# Patient Record
Sex: FEMALE | Race: BLACK OR AFRICAN AMERICAN | Employment: UNEMPLOYED | URBAN - METROPOLITAN AREA
[De-identification: names, ages, dates, MRNs, and addresses within clinical notes are randomized per-mention and may not be internally consistent; named-entity substitution may affect disease eponyms.]

---

## 2017-02-08 ENCOUNTER — OFFICE VISIT (OUTPATIENT)
Dept: CARDIOLOGY CLINIC | Age: 62
End: 2017-02-08

## 2017-02-08 VITALS
DIASTOLIC BLOOD PRESSURE: 76 MMHG | SYSTOLIC BLOOD PRESSURE: 130 MMHG | BODY MASS INDEX: 38.5 KG/M2 | WEIGHT: 190.6 LBS | HEART RATE: 88 BPM

## 2017-02-08 DIAGNOSIS — E11.9 TYPE 2 DIABETES MELLITUS WITHOUT COMPLICATION, WITHOUT LONG-TERM CURRENT USE OF INSULIN (HCC): ICD-10-CM

## 2017-02-08 DIAGNOSIS — I44.7 LBBB (LEFT BUNDLE BRANCH BLOCK): ICD-10-CM

## 2017-02-08 DIAGNOSIS — R06.02 SOB (SHORTNESS OF BREATH) ON EXERTION: Primary | ICD-10-CM

## 2017-02-08 DIAGNOSIS — E66.01 MORBID OBESITY DUE TO EXCESS CALORIES (HCC): ICD-10-CM

## 2017-02-08 PROCEDURE — 99214 OFFICE O/P EST MOD 30 MIN: CPT | Performed by: INTERNAL MEDICINE

## 2017-03-06 ENCOUNTER — HOSPITAL ENCOUNTER (OUTPATIENT)
Dept: MAMMOGRAPHY | Age: 62
Discharge: HOME | End: 2017-03-06
Attending: NURSE PRACTITIONER | Admitting: NURSE PRACTITIONER

## 2017-03-06 DIAGNOSIS — Z12.31 VISIT FOR SCREENING MAMMOGRAM: ICD-10-CM

## 2017-03-07 ENCOUNTER — TELEPHONE (OUTPATIENT)
Dept: SURGERY | Age: 62
End: 2017-03-07

## 2017-05-23 ENCOUNTER — HOSPITAL ENCOUNTER (OUTPATIENT)
Dept: PULMONOLOGY | Age: 62
Discharge: HOME | End: 2017-05-23
Attending: FAMILY MEDICINE | Admitting: FAMILY MEDICINE

## 2017-05-23 ENCOUNTER — HOSPITAL ENCOUNTER (OUTPATIENT)
Dept: NON INVASIVE DIAGNOSTICS | Age: 62
Discharge: HOME | End: 2017-05-23
Admitting: INTERNAL MEDICINE

## 2017-05-23 DIAGNOSIS — R06.02 SHORTNESS OF BREATH: ICD-10-CM

## 2017-05-23 LAB
LV EF: 53 %
LVEF MODALITY: NORMAL

## 2017-05-23 RX ORDER — ALBUTEROL SULFATE 2.5 MG/3ML
2.5 SOLUTION RESPIRATORY (INHALATION) ONCE
Status: COMPLETED | OUTPATIENT
Start: 2017-05-23 | End: 2017-05-23

## 2017-05-23 RX ADMIN — ALBUTEROL SULFATE 2.5 MG: 2.5 SOLUTION RESPIRATORY (INHALATION) at 10:07

## 2018-05-22 ENCOUNTER — OFFICE VISIT (OUTPATIENT)
Dept: CARDIOLOGY CLINIC | Age: 63
End: 2018-05-22

## 2018-05-22 VITALS
WEIGHT: 194.8 LBS | DIASTOLIC BLOOD PRESSURE: 62 MMHG | BODY MASS INDEX: 39.34 KG/M2 | SYSTOLIC BLOOD PRESSURE: 118 MMHG | HEART RATE: 58 BPM

## 2018-05-22 DIAGNOSIS — E78.2 MIXED HYPERLIPIDEMIA: Primary | ICD-10-CM

## 2018-05-22 DIAGNOSIS — I44.7 LBBB (LEFT BUNDLE BRANCH BLOCK): ICD-10-CM

## 2018-05-22 PROCEDURE — 99214 OFFICE O/P EST MOD 30 MIN: CPT | Performed by: NURSE PRACTITIONER

## 2018-05-22 PROCEDURE — 1036F TOBACCO NON-USER: CPT | Performed by: NURSE PRACTITIONER

## 2018-05-22 PROCEDURE — G8400 PT W/DXA NO RESULTS DOC: HCPCS | Performed by: NURSE PRACTITIONER

## 2018-05-22 PROCEDURE — G8417 CALC BMI ABV UP PARAM F/U: HCPCS | Performed by: NURSE PRACTITIONER

## 2018-05-22 PROCEDURE — 3017F COLORECTAL CA SCREEN DOC REV: CPT | Performed by: NURSE PRACTITIONER

## 2018-05-22 PROCEDURE — G8427 DOCREV CUR MEDS BY ELIG CLIN: HCPCS | Performed by: NURSE PRACTITIONER

## 2018-05-22 PROCEDURE — 1123F ACP DISCUSS/DSCN MKR DOCD: CPT | Performed by: NURSE PRACTITIONER

## 2018-05-22 PROCEDURE — 1090F PRES/ABSN URINE INCON ASSESS: CPT | Performed by: NURSE PRACTITIONER

## 2018-05-22 PROCEDURE — 4040F PNEUMOC VAC/ADMIN/RCVD: CPT | Performed by: NURSE PRACTITIONER

## 2018-05-22 RX ORDER — DULOXETIN HYDROCHLORIDE 20 MG/1
20 CAPSULE, DELAYED RELEASE ORAL DAILY
COMMUNITY

## 2018-06-06 ENCOUNTER — HOSPITAL ENCOUNTER (OUTPATIENT)
Dept: GENERAL RADIOLOGY | Age: 63
Discharge: HOME | End: 2018-06-06
Attending: NURSE PRACTITIONER | Admitting: NURSE PRACTITIONER

## 2018-06-06 DIAGNOSIS — Z78.0 POSTMENOPAUSAL: ICD-10-CM

## 2018-06-06 DIAGNOSIS — Z13.820 SCREENING FOR OSTEOPOROSIS: ICD-10-CM

## 2018-06-06 LAB
A/G RATIO: 1.2 (ref 1.1–2.2)
ALBUMIN SERPL-MCNC: 4.1 G/DL (ref 3.4–5)
ALP BLD-CCNC: 56 U/L (ref 40–129)
ALT SERPL-CCNC: 27 U/L (ref 10–40)
ANION GAP SERPL CALCULATED.3IONS-SCNC: 15 MMOL/L (ref 3–16)
AST SERPL-CCNC: 23 U/L (ref 15–37)
BILIRUB SERPL-MCNC: <0.2 MG/DL (ref 0–1)
BUN BLDV-MCNC: 16 MG/DL (ref 7–20)
CALCIUM SERPL-MCNC: 9.7 MG/DL (ref 8.3–10.6)
CHLORIDE BLD-SCNC: 98 MMOL/L (ref 99–110)
CHOLESTEROL, TOTAL: 119 MG/DL (ref 0–199)
CO2: 26 MMOL/L (ref 21–32)
CREAT SERPL-MCNC: 0.8 MG/DL (ref 0.6–1.2)
GFR AFRICAN AMERICAN: >60
GFR NON-AFRICAN AMERICAN: >60
GLOBULIN: 3.4 G/DL
GLUCOSE BLD-MCNC: 168 MG/DL (ref 70–99)
HDLC SERPL-MCNC: 51 MG/DL (ref 40–60)
LDL CHOLESTEROL CALCULATED: 46 MG/DL
POTASSIUM SERPL-SCNC: 5.1 MMOL/L (ref 3.5–5.1)
SODIUM BLD-SCNC: 139 MMOL/L (ref 136–145)
TOTAL PROTEIN: 7.5 G/DL (ref 6.4–8.2)
TRIGL SERPL-MCNC: 109 MG/DL (ref 0–150)
VLDLC SERPL CALC-MCNC: 22 MG/DL

## 2018-07-16 ENCOUNTER — HOSPITAL ENCOUNTER (OUTPATIENT)
Dept: MAMMOGRAPHY | Age: 63
Discharge: HOME | End: 2018-07-20
Payer: MEDICARE

## 2018-07-16 DIAGNOSIS — Z12.31 VISIT FOR SCREENING MAMMOGRAM: ICD-10-CM

## 2018-07-16 PROCEDURE — 77067 SCR MAMMO BI INCL CAD: CPT

## 2018-09-12 ENCOUNTER — TELEPHONE (OUTPATIENT)
Dept: SURGERY | Age: 63
End: 2018-09-12

## 2018-09-12 ENCOUNTER — OFFICE VISIT (OUTPATIENT)
Dept: SURGERY | Age: 63
End: 2018-09-12

## 2018-09-12 VITALS
HEIGHT: 59 IN | BODY MASS INDEX: 37.29 KG/M2 | RESPIRATION RATE: 17 BRPM | WEIGHT: 185 LBS | SYSTOLIC BLOOD PRESSURE: 128 MMHG | DIASTOLIC BLOOD PRESSURE: 84 MMHG

## 2018-09-12 DIAGNOSIS — K64.1 GRADE II HEMORRHOIDS: Primary | ICD-10-CM

## 2018-09-12 PROCEDURE — 1101F PT FALLS ASSESS-DOCD LE1/YR: CPT | Performed by: SURGERY

## 2018-09-12 PROCEDURE — 1036F TOBACCO NON-USER: CPT | Performed by: SURGERY

## 2018-09-12 PROCEDURE — 3017F COLORECTAL CA SCREEN DOC REV: CPT | Performed by: SURGERY

## 2018-09-12 PROCEDURE — G8399 PT W/DXA RESULTS DOCUMENT: HCPCS | Performed by: SURGERY

## 2018-09-12 PROCEDURE — G8427 DOCREV CUR MEDS BY ELIG CLIN: HCPCS | Performed by: SURGERY

## 2018-09-12 PROCEDURE — 1123F ACP DISCUSS/DSCN MKR DOCD: CPT | Performed by: SURGERY

## 2018-09-12 PROCEDURE — 1090F PRES/ABSN URINE INCON ASSESS: CPT | Performed by: SURGERY

## 2018-09-12 PROCEDURE — 99203 OFFICE O/P NEW LOW 30 MIN: CPT | Performed by: SURGERY

## 2018-09-12 PROCEDURE — 4040F PNEUMOC VAC/ADMIN/RCVD: CPT | Performed by: SURGERY

## 2018-09-12 PROCEDURE — G8417 CALC BMI ABV UP PARAM F/U: HCPCS | Performed by: SURGERY

## 2018-09-12 RX ORDER — HYDROCORTISONE ACETATE 25 MG/1
25 SUPPOSITORY RECTAL DAILY PRN
Qty: 30 SUPPOSITORY | Refills: 1 | Status: ON HOLD | OUTPATIENT
Start: 2018-09-12 | End: 2019-05-03 | Stop reason: HOSPADM

## 2018-09-12 NOTE — PROGRESS NOTES
noted in HPI  GI: reviewed and negative except as noted in HPI      Objective:     GEN: appears well, no distress, appears stated age  PSYCH: normal mood, normal affect  NECK: no neck masses, trachea midline  ENT: moist oral mucosa; anicteric  SKIN: no rash or jaundice  CV: regular heart rate and rhythm  PULM: normal respiratory effort, no wheezing  GI: soft non tender abdomen. Normal bowel sounds  RECTAL: No external masses or lesions. Some pain on rectal exam without obvious findings. She is unable to prolapse any tissue. Small internal hemorrhoids not bleeding on anoscopy.     EXT/NEURO: normal gait, strength/sensation grossly intact in all extremities      Assessment:     Rectal pain, bleeding     Plan:     Suppository trial    Recommend colonoscopy to evaluate for any source of bleeding such as polyp or Margarita Niño M.D.  9/12/18   12:15 PM

## 2018-09-12 NOTE — LETTER
COLONOSCOPY: Pre-Procedure Information, Instructions and Prep    YOU ARE SCHEDULED FOR A COLONOSCOPY WITH DR. Dat Stark    Date and time:  Thursday 10/11/2018 @ 9:15 a.m. Please arrive at the Endoscopy Unit @ 7:45 a.m. Location: [facility] [address] [place] [address]. [facility], [address]    · The bowel prep solution (GoLytely) will be called in to the [address] on [address] [facility]. Approximately 7 days before your procedure. You will need it starting at 4:00 p.m. on 10/10/18. It comes in a powder form that you mix with water. Most people prefer the taste after it has been refrigerated. PLEASE READ THIS ENTIRE HANDOUT THOROUGHLY. CALL THE OFFICE WITH ANY QUESTIONS. # [phone] - Michelle P. What is a Colonoscopy? A colonoscopy is a test (also called a procedure) that lets the doctor look inside your large intestine (colon). The doctor uses a thin, lighted tube called a colonoscope. There are many reasons to perform a colonoscopy, such as bleeding, abdominal pain, anemia, diarrhea, constipation, or for cancer prevention. During the procedure, the doctor can remove abnormal growths (polyps), which can help decrease the risk of developing colon and rectal cancer. Tissue sampling (biopsy) can also be performed to help diagnose other conditions of the colon and rectum. How is a Colonoscopy Done? This procedure is done in the Endoscopy Unit at the hospital. Please arrive  90 minutes before the procedure. After you arrive and register, an IV will be placed. An anesthesia provider will give you medicine to help you relax and not feel pain. Most people do not remember having the test because of the sedation medication. The doctor gently moves the colonoscope instrument through the colon and fills it with air. There is a small video camera on the tip that allows the doctor to see the colon and take pictures.  The procedure usually takes 30 to 45 minutes. It may take longer if the doctor has to remove polyps. Scheduling Your Colonoscopy    Your colonoscopy will typically be scheduled weeks to months in advance. The prep solution will be called into your pharmacy a few days in advance of your procedure. Please make sure you have a working telephone, mailing address, and email address in our computer system, as the office will communicate with you as a courtesy 2 days before your procedure, as well as 3-5 days afterward with any biopsy results. If you need to cancel or reschedule, please do so at least 7 days before the procedure, so that we can be considerate to other patients who are waiting to be scheduled. If you cancel or reschedule less than 7 days before your procedure, you will be placed on a lower priority list and may be charged a cancellation fee. How Do You Prepare for the Procedure? · Understand exactly what procedure is planned, along with the risks  · Tell your doctors ALL the medicines, vitamins, supplements, and herbal remedies you take. Some of these can increase the risk of bleeding or interact with anesthesia. · If you take blood thinners (Coumadin, aspirin, Plavix, Pletal, Aggrenox, Xarelto, Pradaxa, Eliquis), be sure to talk to your doctor. He or she will tell you if and when you should stop taking these medicines before your procedure. · If you are diabetic please talk to your Doctor about your Insulin instructions. PLEASE READ AND COMPLETELY FOLLOW THE PREP INFORMATION BELOW    1 Day Before your Colonoscopy    · ON THE DAY PRIOR TO YOUR PROCEDURE, DO NOT EAT ANY SOLID FOOD THE ENTIRE DAY  · ON THE DAY PRIOR TO YOUR PROCEDURE, DRINK ONLY CLEAR LIQUIDS (SEE LAST PAGE FOR LIST OF \"APPROVED\" LIQUIDS)  · Drink at least 8oz of clear liquids every hour throughout the day to keep hydrated.   · In the morning - mix the GoLytely solution (this was sent to your pharmacy) and place in the refrigerator. Do not eat any solid food the entire day. · At 4:00 p.m. - Drink an 8oz glass of GoLytely every 10-20 min until ½ of the bottle is empty. If you become nauseated, take a rest and then be sure to finish half of the bottle. The Day of your Colonoscopy    · 6 hours before your procedure (3:15 a.m.) - Drink an 8oz glass of GoLytely every 10-20 min until the bottle is completely gone. · You may continue to have clear liquids up to 3 hours before the test (nothing after 6:15 a.m.). · The morning of the procedure - You may take any heart or blood pressure medications. · You must have someone WITH YOU to drive you home after the procedure. · Please arrive at the hospital 90 minutes prior to your procedure start time. · Please bring a list of medications, any inhalers, CPAP machines, photo ID and insurance cards with you. If you have a pacemaker or defibrillator, please inform your nurse. For many people, the prep is worse than the procedure itself. It may be uncomfortable, and you may feel hungry on the clear liquid diet. Some people do not go to work or do not do their usual activities on the day of the prep. If you eat solid food or do not complete the prep, your colonoscopy may be cancelled or you may be asked to do the procedure again. What can you expect after a colonoscopy? The nurses will watch you in the recovery area for 1-2 hours until the medicines wear off. Then you can go home. You will need a ride. You can resume a normal diet after the procedure. You are not legally allowed to drive or operate machinery after the procedure, as you will have received sedation. Do not plan on going back to work that day. Your doctor will talk to you about when you will need your next colonoscopy. This will depend on the results of the colonoscopy and your medical and family history.     Complications: Colonoscopy is generally a very safe procedure with low complication risk. Common complaints after the procedure include bloating and excessive flatulence, and occasionally nausea. This is normal.     Other complications include:  · Anesthesia/sedation issues  · Bleeding, especially if polyps are removed (uncommon)  · Most bleeding will stop on its own, but occasionally can require a repeat colonoscopy or hospital admission  · This risk is slightly higher in patients on blood thinners. Ask your doctor about any blood thinning medications that you take   · Perforation, or a hole in the colon, which can require hospital admission or emergency surgery (very rare)    Call the office [phone] or go to your nearest emergency room if you have fever greater than 101º, severe abdominal pain that does not get better after a few hours, or rectal bleeding that does not stop after a few hours. You may also call the office with any non-emergency questions or concerns. Follow-up care is a key part of your treatment and safety:    If polyps or other abnormalities are found, tissue samples will be sent to the pathology lab to be examined. Results are usually available in 3-5 business days. My office typically calls with results. If you do not hear from us, please CALL US and ask for your results: [phone]. Sometimes these results will determine when your next colonoscopy is recommended. Be sure to communicate with your primary care physician about the results of your colonoscopy. Clear Liquid Diet for Colonoscopy Preparation      The day before and the morning of your colonoscopy, you will be on a clear liquid diet. Below, you will find examples of liquids you can consume. You will want to make sure you drink plenty of fluids so your body stays hydrated. Starting the day before your screening, dont eat any solid food until after your colonoscopy.     YES  OK TO DRINK NO  AVOID THESE   Water  Tea and black coffee without any milk or cream   Flavored water without red or purple dye   Clear, light colored juices such as apple, white grape, lemonade without    pulp, and white cranberry   Clear broth including chicken, beef, or vegetable (containing no meat or    vegetables)   Soda   Sports drinks such as Gatorade and Propel (light colors only)   Popsicles without fruit or cream; no red or purple dye   Jell-O or other gelatin without fruit; no red or purple dye   Boost Breeze nutritional drink (other Boost products not allowed)

## 2018-09-17 ENCOUNTER — TELEPHONE (OUTPATIENT)
Dept: SURGERY | Age: 63
End: 2018-09-17

## 2018-09-24 ENCOUNTER — TELEPHONE (OUTPATIENT)
Dept: SURGERY | Age: 63
End: 2018-09-24

## 2018-09-24 NOTE — TELEPHONE ENCOUNTER
Patient called because insurance will not cover prescription for suppository. ..asks that Dr. Angelal Adler writes new script and sends to [facility] on [address] as soon as possible. She requests return call to let her know when new script has been sent.  [phone] (home)

## 2018-09-25 DIAGNOSIS — K64.9 HEMORRHOIDS, UNSPECIFIED HEMORRHOID TYPE: Primary | ICD-10-CM

## 2018-09-25 RX ORDER — LIDOCAINE 50 MG/G
OINTMENT TOPICAL
Qty: 1 TUBE | Refills: 1 | Status: SHIPPED | OUTPATIENT
Start: 2018-09-25 | End: 2019-07-03

## 2018-09-25 NOTE — TELEPHONE ENCOUNTER
E-scripted topical numbing medicine to Elizabeth. She can try that. Could also try Preparation H or Witch Hazel over the counter.  Please let her know    CAR

## 2018-09-27 ENCOUNTER — TELEPHONE (OUTPATIENT)
Dept: SURGERY | Age: 63
End: 2018-09-27

## 2018-09-27 NOTE — TELEPHONE ENCOUNTER
CARLOS and explained that her colonoscopy with Dr. Jacinta Curling scheduled for 10/11/18 needed to be re-scheduled (provider unavailable). Patient is re-scheduled for 10/25/18 at 8:30 am arrival time 7:00 am at [facility]. Marked applicable changes on surgery order and faxed to [facility]. Patient mentioned making sure the GoLytely is covered by her insurance. Spoke with Rebecca Young. at [address] [facility] is covered at no cost to patient.

## 2018-10-17 DIAGNOSIS — K62.5 RECTAL BLEED: Primary | ICD-10-CM

## 2018-10-17 DIAGNOSIS — K64.1 GRADE II HEMORRHOIDS: ICD-10-CM

## 2018-10-23 ENCOUNTER — TELEPHONE (OUTPATIENT)
Dept: SURGERY | Age: 63
End: 2018-10-23

## 2018-10-23 NOTE — TELEPHONE ENCOUNTER
CARLOS and reminded her of colonoscopy scheduled for Thursday at [facility] at 8:30 am arrival time 7:00 am. Patient knows not to eat any solid food tomorrow, drink plenty of the \"approved\" liquids and start drinking the Golytely at 4:00 pm. Patient knows to have someone with her at the hospital that can drive her home after the colonoscopy. Patient has my direct number in case of any questions.

## 2018-10-25 ENCOUNTER — ANESTHESIA (OUTPATIENT)
Dept: ENDOSCOPY | Age: 63
End: 2018-10-25
Payer: COMMERCIAL

## 2018-10-25 ENCOUNTER — HOSPITAL ENCOUNTER (OUTPATIENT)
Age: 63
Setting detail: OUTPATIENT SURGERY
Discharge: HOME | End: 2018-10-25
Attending: SURGERY | Admitting: SURGERY
Payer: COMMERCIAL

## 2018-10-25 ENCOUNTER — ANESTHESIA EVENT (OUTPATIENT)
Dept: ENDOSCOPY | Age: 63
End: 2018-10-25
Payer: COMMERCIAL

## 2018-10-25 VITALS
TEMPERATURE: 98.5 F | HEIGHT: 59 IN | WEIGHT: 185 LBS | RESPIRATION RATE: 20 BRPM | OXYGEN SATURATION: 100 % | SYSTOLIC BLOOD PRESSURE: 122 MMHG | DIASTOLIC BLOOD PRESSURE: 71 MMHG | BODY MASS INDEX: 37.29 KG/M2 | HEART RATE: 73 BPM

## 2018-10-25 VITALS
OXYGEN SATURATION: 98 % | RESPIRATION RATE: 22 BRPM | SYSTOLIC BLOOD PRESSURE: 101 MMHG | DIASTOLIC BLOOD PRESSURE: 68 MMHG

## 2018-10-25 PROCEDURE — 7100000010 HC PHASE II RECOVERY - FIRST 15 MIN: Performed by: SURGERY

## 2018-10-25 PROCEDURE — 3700000001 HC ADD 15 MINUTES (ANESTHESIA): Performed by: SURGERY

## 2018-10-25 PROCEDURE — 7100000011 HC PHASE II RECOVERY - ADDTL 15 MIN: Performed by: SURGERY

## 2018-10-25 PROCEDURE — 3700000000 HC ANESTHESIA ATTENDED CARE: Performed by: SURGERY

## 2018-10-25 PROCEDURE — 2580000003 HC RX 258: Performed by: SURGERY

## 2018-10-25 PROCEDURE — 2500000003 HC RX 250 WO HCPCS: Performed by: NURSE ANESTHETIST, CERTIFIED REGISTERED

## 2018-10-25 PROCEDURE — 3609009500 HC COLONOSCOPY DIAGNOSTIC OR SCREENING: Performed by: SURGERY

## 2018-10-25 PROCEDURE — 6360000002 HC RX W HCPCS: Performed by: NURSE ANESTHETIST, CERTIFIED REGISTERED

## 2018-10-25 PROCEDURE — 45330 DIAGNOSTIC SIGMOIDOSCOPY: CPT | Performed by: SURGERY

## 2018-10-25 RX ORDER — PROPOFOL 10 MG/ML
INJECTION, EMULSION INTRAVENOUS PRN
Status: DISCONTINUED | OUTPATIENT
Start: 2018-10-25 | End: 2018-10-25 | Stop reason: SDUPTHER

## 2018-10-25 RX ORDER — MIDAZOLAM HYDROCHLORIDE 1 MG/ML
INJECTION INTRAMUSCULAR; INTRAVENOUS PRN
Status: DISCONTINUED | OUTPATIENT
Start: 2018-10-25 | End: 2018-10-25 | Stop reason: SDUPTHER

## 2018-10-25 RX ORDER — PROPOFOL 10 MG/ML
INJECTION, EMULSION INTRAVENOUS CONTINUOUS PRN
Status: DISCONTINUED | OUTPATIENT
Start: 2018-10-25 | End: 2018-10-25 | Stop reason: SDUPTHER

## 2018-10-25 RX ORDER — SODIUM CHLORIDE, SODIUM LACTATE, POTASSIUM CHLORIDE, CALCIUM CHLORIDE 600; 310; 30; 20 MG/100ML; MG/100ML; MG/100ML; MG/100ML
INJECTION, SOLUTION INTRAVENOUS ONCE
Status: COMPLETED | OUTPATIENT
Start: 2018-10-25 | End: 2018-10-25

## 2018-10-25 RX ORDER — LIDOCAINE HYDROCHLORIDE 10 MG/ML
INJECTION, SOLUTION EPIDURAL; INFILTRATION; INTRACAUDAL; PERINEURAL PRN
Status: DISCONTINUED | OUTPATIENT
Start: 2018-10-25 | End: 2018-10-25 | Stop reason: SDUPTHER

## 2018-10-25 RX ADMIN — SODIUM CHLORIDE, POTASSIUM CHLORIDE, SODIUM LACTATE AND CALCIUM CHLORIDE: 600; 310; 30; 20 INJECTION, SOLUTION INTRAVENOUS at 08:39

## 2018-10-25 RX ADMIN — PROPOFOL 120 MCG/KG/MIN: 10 INJECTION, EMULSION INTRAVENOUS at 08:49

## 2018-10-25 RX ADMIN — LIDOCAINE HYDROCHLORIDE 30 MG: 10 INJECTION, SOLUTION EPIDURAL; INFILTRATION; INTRACAUDAL; PERINEURAL at 08:44

## 2018-10-25 RX ADMIN — MIDAZOLAM HYDROCHLORIDE 2 MG: 1 INJECTION INTRAMUSCULAR; INTRAVENOUS at 08:46

## 2018-10-25 RX ADMIN — PROPOFOL 50 MG: 10 INJECTION, EMULSION INTRAVENOUS at 08:49

## 2018-10-25 ASSESSMENT — PULMONARY FUNCTION TESTS
PIF_VALUE: 0
PIF_VALUE: 1
PIF_VALUE: 0

## 2018-11-08 NOTE — H&P
Patient is a 70 y.o. woman with rectal pain and bleeding     HPI: Ms. Jade Galloway reports several weeks of rectal pain and bleeding. She has never had this before. She thinks her bottom may be falling out but denies having to reduce anything. She had extensive intestinal surgery in 2016          Patient Active Problem List     Diagnosis Date Noted    Encephalopathy acute 10/23/2016    H/O ileostomy 09/20/2016    Type 2 diabetes mellitus without complication, without long-term current use of insulin ([facility] 09/16/2016    Debility 03/23/2016    Respiratory failure following trauma and surgery ([facility] 03/06/2016    Ischemic bowel disease ([facility] 03/06/2016    Obesity 03/06/2016    Col[facility] obstruction ([facility] 03/03/2016      Past Medical History        Past Medical History:   Diagnosis Date    Diabetes mellitus ([facility]      Diverticulitis      H/O ischemic bowel disease 03/2016    Hepatitis C      History of kidney stones       x 1 episode    Hyperlipidemia      LBBB (left bundle branch block)      Loose stools       secondary to history of colectomy    Paranoid schizophrenia (HCC)      Proteinuria       history of    Sciatica      Sleep apnea       USES CPAP         Past Surgical History         Past Surgical History:   Procedure Laterality Date    ABDOMEN SURGERY   09/20/2016     OPEN ILEOSTOMY TAKEDOWN                   COLONOSCOPY        DILATION AND CURETTAGE OF UTERUS         w/tubaligation    HYSTERECTOMY         PARTIAL VAGINAL    KIDNEY STONE SURGERY        KIDNEY STONE SURGERY        KNEE SURGERY         bilateral knee replacements    OTHER SURGICAL HISTORY   3/5/16     DIAGNOSTIC LAPAROSCOPY, SUBTOTAL COLECTOMY, COLOSTOMY,    WISDOM TOOTH EXTRACTION             Prescriptions Prior to Admission   Not in a hospital admission.      No Known Allergies   Social History   Substance Use Topics    Smoking status: Former Smoker       Packs/day: 0.00       Types: Cigarettes       Start date: 3/22/2001     Quit date: 2/22/2016    Smokeless tobacco: Never Used    Alcohol use No         Review of Systems     GEN: reviewed and negative except as noted in HPI  GI: reviewed and negative except as noted in HPI        Objective:      GEN: appears well, no distress, appears stated age  PSYCH: normal mood, normal affect  NECK: no neck masses, trachea midline  ENT: moist oral mucosa; anicteric  SKIN: no rash or jaundice  CV: regular heart rate and rhythm  PULM: normal respiratory effort, no wheezing  GI: soft non tender abdomen. Normal bowel sounds  RECTAL: No external masses or lesions. Some pain on rectal exam without obvious findings. She is unable to prolapse any tissue. Small internal hemorrhoids not bleeding on anoscopy.     EXT/NEURO: normal gait, strength/sensation grossly intact in all extremities        Assessment:      Rectal pain, bleeding      Plan:      Suppository trial     RBA of colonoscopy were discussed prior to the procedure    Lizzy Gates M.D.  11/8/18   10:40 AM

## 2019-03-06 ENCOUNTER — OFFICE VISIT (OUTPATIENT)
Dept: SURGERY | Age: 64
End: 2019-03-06
Payer: COMMERCIAL

## 2019-03-06 VITALS
SYSTOLIC BLOOD PRESSURE: 144 MMHG | WEIGHT: 176 LBS | RESPIRATION RATE: 16 BRPM | DIASTOLIC BLOOD PRESSURE: 82 MMHG | BODY MASS INDEX: 35.48 KG/M2 | HEIGHT: 59 IN

## 2019-03-06 DIAGNOSIS — K62.3 RECTAL PROLAPSE: Primary | ICD-10-CM

## 2019-03-06 PROCEDURE — 1123F ACP DISCUSS/DSCN MKR DOCD: CPT | Performed by: SURGERY

## 2019-03-06 PROCEDURE — 99214 OFFICE O/P EST MOD 30 MIN: CPT | Performed by: SURGERY

## 2019-03-06 PROCEDURE — G8427 DOCREV CUR MEDS BY ELIG CLIN: HCPCS | Performed by: SURGERY

## 2019-03-06 PROCEDURE — G8417 CALC BMI ABV UP PARAM F/U: HCPCS | Performed by: SURGERY

## 2019-03-06 PROCEDURE — 1090F PRES/ABSN URINE INCON ASSESS: CPT | Performed by: SURGERY

## 2019-03-06 PROCEDURE — 1101F PT FALLS ASSESS-DOCD LE1/YR: CPT | Performed by: SURGERY

## 2019-03-06 PROCEDURE — 1036F TOBACCO NON-USER: CPT | Performed by: SURGERY

## 2019-03-06 PROCEDURE — 3017F COLORECTAL CA SCREEN DOC REV: CPT | Performed by: SURGERY

## 2019-03-06 PROCEDURE — G8484 FLU IMMUNIZE NO ADMIN: HCPCS | Performed by: SURGERY

## 2019-03-06 PROCEDURE — 4040F PNEUMOC VAC/ADMIN/RCVD: CPT | Performed by: SURGERY

## 2019-03-06 PROCEDURE — G8399 PT W/DXA RESULTS DOCUMENT: HCPCS | Performed by: SURGERY

## 2019-03-08 ENCOUNTER — TELEPHONE (OUTPATIENT)
Dept: SURGERY | Age: 64
End: 2019-03-08

## 2019-03-08 DIAGNOSIS — R19.7 DIARRHEA, UNSPECIFIED TYPE: Primary | ICD-10-CM

## 2019-03-11 RX ORDER — DIPHENOXYLATE HYDROCHLORIDE AND ATROPINE SULFATE 2.5; .025 MG/1; MG/1
1 TABLET ORAL 2 TIMES DAILY PRN
Qty: 60 TABLET | Refills: 1 | Status: SHIPPED | OUTPATIENT
Start: 2019-03-11 | End: 2019-05-14 | Stop reason: SDUPTHER

## 2019-03-28 ENCOUNTER — TELEPHONE (OUTPATIENT)
Dept: SURGERY | Age: 64
End: 2019-03-28

## 2019-03-28 RX ORDER — METRONIDAZOLE 500 MG/1
TABLET ORAL
Qty: 3 TABLET | Refills: 0 | Status: ON HOLD | OUTPATIENT
Start: 2019-03-28 | End: 2019-05-03 | Stop reason: HOSPADM

## 2019-03-28 RX ORDER — NEOMYCIN SULFATE 500 MG/1
TABLET ORAL
Qty: 6 TABLET | Refills: 0 | Status: ON HOLD | OUTPATIENT
Start: 2019-03-28 | End: 2019-05-03 | Stop reason: HOSPADM

## 2019-04-02 ENCOUNTER — TELEPHONE (OUTPATIENT)
Dept: SURGERY | Age: 64
End: 2019-04-02

## 2019-04-02 NOTE — TELEPHONE ENCOUNTER
[facility] health called stating that the recopexy was not approved for inpatient but was approved for out patient. I asked the representative if we could switch this to a 23 observation. She said she would call me back with an answer.      Dr. Dex Milligan are you ok with switching this to 23 obvs

## 2019-04-04 ENCOUNTER — TELEPHONE (OUTPATIENT)
Dept: SURGERY | Age: 64
End: 2019-04-04

## 2019-04-04 NOTE — TELEPHONE ENCOUNTER
The patient is scheduled for surgery with Dr. Michelle Mason on 4-. She received her surgery instructions in the mail today. She would like to go over the instructions with someone. Please call.

## 2019-04-05 DIAGNOSIS — K62.3 RECTAL PROLAPSE: Primary | ICD-10-CM

## 2019-04-05 RX ORDER — POLYETHYLENE GLYCOL 3350 17 G/17G
17 POWDER, FOR SOLUTION ORAL DAILY PRN
Qty: 510 G | Refills: 0 | Status: ON HOLD | OUTPATIENT
Start: 2019-04-05 | End: 2019-05-03 | Stop reason: HOSPADM

## 2019-04-05 RX ORDER — DOCUSATE SODIUM 100 MG/1
100 CAPSULE, LIQUID FILLED ORAL DAILY PRN
Qty: 30 CAPSULE | Refills: 0 | Status: ON HOLD | OUTPATIENT
Start: 2019-04-05 | End: 2019-05-03 | Stop reason: HOSPADM

## 2019-04-11 ENCOUNTER — TELEPHONE (OUTPATIENT)
Dept: SURGERY | Age: 64
End: 2019-04-11

## 2019-04-17 ENCOUNTER — HOSPITAL ENCOUNTER (EMERGENCY)
Age: 64
Discharge: HOME | End: 2019-04-17
Attending: EMERGENCY MEDICINE
Payer: COMMERCIAL

## 2019-04-17 VITALS
DIASTOLIC BLOOD PRESSURE: 95 MMHG | TEMPERATURE: 98.4 F | WEIGHT: 173 LBS | HEIGHT: 59 IN | SYSTOLIC BLOOD PRESSURE: 157 MMHG | OXYGEN SATURATION: 99 % | RESPIRATION RATE: 18 BRPM | BODY MASS INDEX: 34.88 KG/M2 | HEART RATE: 83 BPM

## 2019-04-17 DIAGNOSIS — N39.0 URINARY TRACT INFECTION WITHOUT HEMATURIA, SITE UNSPECIFIED: Primary | ICD-10-CM

## 2019-04-17 LAB
BACTERIA: ABNORMAL /HPF
BILIRUBIN URINE: NEGATIVE
BLOOD, URINE: NEGATIVE
CLARITY: CLEAR
COLOR: YELLOW
EPITHELIAL CELLS, UA: ABNORMAL /HPF
GLUCOSE URINE: NEGATIVE MG/DL
KETONES, URINE: NEGATIVE MG/DL
LEUKOCYTE ESTERASE, URINE: NEGATIVE
MICROSCOPIC EXAMINATION: ABNORMAL
NITRITE, URINE: NEGATIVE
PH UA: 6 (ref 5–8)
PROTEIN UA: NEGATIVE MG/DL
RBC UA: ABNORMAL /HPF (ref 0–2)
SPECIFIC GRAVITY UA: 1.02 (ref 1–1.03)
UROBILINOGEN, URINE: 0.2 E.U./DL
WBC UA: ABNORMAL /HPF (ref 0–5)

## 2019-04-17 PROCEDURE — 87086 URINE CULTURE/COLONY COUNT: CPT

## 2019-04-17 PROCEDURE — 81001 URINALYSIS AUTO W/SCOPE: CPT

## 2019-04-17 PROCEDURE — 99283 EMERGENCY DEPT VISIT LOW MDM: CPT

## 2019-04-17 RX ORDER — NITROFURANTOIN 25; 75 MG/1; MG/1
100 CAPSULE ORAL 2 TIMES DAILY
Qty: 10 CAPSULE | Refills: 0 | Status: SHIPPED | OUTPATIENT
Start: 2019-04-17 | End: 2019-04-22

## 2019-04-17 ASSESSMENT — ENCOUNTER SYMPTOMS
ALLERGIC/IMMUNOLOGIC NEGATIVE: 1
DIARRHEA: 1
RECTAL PAIN: 0
VOMITING: 0
RESPIRATORY NEGATIVE: 1
EYES NEGATIVE: 1
ABDOMINAL PAIN: 0
NAUSEA: 0
BLOOD IN STOOL: 1

## 2019-04-17 ASSESSMENT — PAIN DESCRIPTION - ORIENTATION: ORIENTATION: LOWER

## 2019-04-17 ASSESSMENT — PAIN DESCRIPTION - DESCRIPTORS: DESCRIPTORS: ACHING

## 2019-04-17 ASSESSMENT — PAIN DESCRIPTION - LOCATION: LOCATION: BACK

## 2019-04-17 ASSESSMENT — PAIN SCALES - GENERAL: PAINLEVEL_OUTOF10: 4

## 2019-04-17 ASSESSMENT — PAIN DESCRIPTION - FREQUENCY: FREQUENCY: INTERMITTENT

## 2019-04-17 ASSESSMENT — PAIN DESCRIPTION - PROGRESSION: CLINICAL_PROGRESSION: GRADUALLY WORSENING

## 2019-04-17 NOTE — ED PROVIDER NOTES
[address] [facility] ENCOUNTER          ATTENDING PHYSICIAN NOTE       Date of evaluation: 4/17/2019    Chief Complaint     Back Pain (back pain began about  3 days ago) and Urinary Frequency (some discomfort urinating  fears she has UTI)      History of Present Illness     Colt Guzmán is a 75 y.o. female who presents with dysuria, feeling of warmth in her urethra when she urinates and some discomfort in her lower back has been present for 3 days. Is constant. It's somewhat helped after she goes to the bathroom. She reports she's had some diarrhea, but says that that is chronic with hercolon surgeries, and is unchanged. She denies irene abdominal pain otherwise. Denies nausea vomiting, fever, change in appetite, or any other problems. She reports that she's had multiple UTIs in the past, and she very sure this feels like a UTI. Review of Systems     Review of Systems   Constitutional: Negative. HENT: Negative. Eyes: Negative. Respiratory: Negative. Cardiovascular: Negative. Gastrointestinal: Positive for blood in stool (occ blood in stool- known issue with prolaps) and diarrhea. Negative for abdominal pain, nausea, rectal pain (known rectal prolapse without pain) and vomiting. Endocrine: Negative. Genitourinary: Positive for dysuria and urgency. Musculoskeletal: Negative. Skin: Negative. Allergic/Immunologic: Negative. Neurological: Negative. Hematological: Negative. Psychiatric/Behavioral: Negative. Past Medical, Surgical, Family, and Social History     She has a past medical history of Diabetes mellitus ([address] Diverticulitis, H/O ischemic bowel disease, Hepatitis C, History of kidney stones, Hyperlipidemia, LBBB (left bundle branch block), Loose stools, Paranoid schizophrenia ([address] Proteinuria, Sciatica, and Sleep apnea. She has a past surgical history that includes Kidney stone surgery; knee surgery;  Hysterectomy; other surgical history (3/5/16); Colonoscopy; [place] tooth extraction; Dilation and curettage of uterus; Kidney stone surgery; Abdomen surgery (09/20/2016); and pr colonoscopy flx dx w/collj spec when pfrmd (N/A, 10/25/2018). Her family history is not on file. She reports that she quit smoking about 3 years ago. Her smoking use included cigarettes. She started smoking about 18 years ago. She smoked 0.00 packs per day. She has never used smokeless tobacco. She reports that she does not drink alcohol or use drugs. Medications     Previous Medications    ACETAMINOPHEN 650 MG TABS    Take 650 mg by mouth every 4 hours as needed    ALBUTEROL SULFATE  (90 BASE) MCG/ACT INHALER    Inhale 2 puffs into the lungs every 6 hours as needed for Wheezing    ATORVASTATIN (LIPITOR) 40 MG TABLET    Take 40 mg by mouth daily    DIPHENHYDRAMINE (BENADRYL) 25 MG CAPSULE    Take 25-50 mg by mouth nightly as needed for Sleep     DOCUSATE SODIUM (COLACE) 100 MG CAPSULE    Take 1 capsule by mouth daily as needed for Constipation (on day prior to surgery, take 2 tablets at noon.) Take 2 tablets at noon, day prior to surgery. DULOXETINE (CYMBALTA) 20 MG EXTENDED RELEASE CAPSULE    Take 20 mg by mouth daily    FAMOTIDINE (PEPCID) 20 MG TABLET    Take 1 tablet by mouth 2 times daily    GLUCOSE BLOOD VI TEST STRIPS (COOL BLOOD GLUCOSE TEST STRIPS) STRIP    1 each by In Vitro route daily As needed. GLYBURIDE MICRONIZED (GLYNASE PRESTAB) 6 MG TABLET    Take 3 mg by mouth 2 times daily (before meals)     HYDROCORTISONE (ANUSOL-HC) 25 MG SUPPOSITORY    Place 1 suppository rectally daily as needed for Hemorrhoids    IPRATROPIUM-ALBUTEROL (DUONEB) 0.5-2.5 (3) MG/3ML SOLN NEBULIZER SOLUTION    Inhale 3 mLs into the lungs every 4 hours as needed for Shortness of Breath    LIDOCAINE (XYLOCAINE) 5 % OINTMENT    Apply topically 2-3 times daily as needed.     LOPERAMIDE (RA ANTI-DIARRHEAL) 2 MG CAPSULE    Take 1 capsule by mouth 4 times daily as needed for Diarrhea    LORAZEPAM (ATIVAN) 0.5 MG TABLET    Take 1 tablet by mouth every 12 hours as needed    LURASIDONE (LATUDA) 40 MG TABS TABLET    Take 1 tablet by mouth daily    METRONIDAZOLE (FLAGYL) 500 MG TABLET    Take one tablet by mouth 3 times on the day prior to surgery. Take one tablet at 1pm, 2pm and 9pm.    MULTIPLE VITAMINS-MINERALS (MULTIVITAMIN PO)    Take by mouth    NAPROXEN (NAPROSYN) 500 MG TABLET    Take 500 mg by mouth 2 times daily (with meals)    NEOMYCIN (MYCIFRADIN) 500 MG TABLET    Take two tablet 3 times the day before surgery. Take two tablet at 1pm, two tablet at 2pm and two tablet at 9pm.    ONDANSETRON (ZOFRAN) 4 MG TABLET    Take 1 tablet by mouth every 8 hours as needed for Nausea or Vomiting    POLYETHYLENE GLYCOL (GLYCOLAX) POWDER    Take 17 g by mouth daily as needed (Dispense 8.3oz bottle for Bowel Prep day before surgery.) Take day before surgery for bowel prep    SERTRALINE (ZOLOFT) 50 MG TABLET    Take 50 mg by mouth daily. ZOLPIDEM (AMBIEN) 10 MG TABLET    Take 10 mg by mouth nightly as needed. Allergies     She has No Known Allergies. Physical Exam     INITIAL VITALS: BP: (!) 157/95, Temp: 98.4 °F (36.9 °C), Pulse: 83, Resp: 18, SpO2: 99 %    Physical Exam   Constitutional: She is oriented to person, place, and time. She appears well-developed and well-nourished. No distress. HENT:   Head: Normocephalic and atraumatic. Mouth/Throat: Oropharynx is clear and moist.   Eyes: EOM are normal.   Neck: Normal range of motion. No JVD present. Cardiovascular: Normal rate and intact distal pulses. 3/6 systolic murmur   Pulmonary/Chest: Effort normal and breath sounds normal. No respiratory distress. She has no wheezes. She has no rales. Abdominal: Soft. Bowel sounds are normal. She exhibits no mass. There is no tenderness. There is no rebound and no guarding. Musculoskeletal: She exhibits no deformity.    Neurological: She is alert and oriented to person, place, and scan of the abdomen to ensure that nothing else could be causing her lower back pain. She reports that this feels like a UTI to her, and doesn't want to pursue a further workup at this point. I believe the safest route is to pursue a further workup, but that as she is very well in appearance otherwise, with normal vital signs, no fever, no complaints of pain otherwise, and I believe it's reasonable to trial course of antibiotics, and she reports that she can immediately return should she have any worsening symptoms or she's not getting better, and again reports that she does not want to pursue a further workup right now. Prescribed Macrobid, given return precautions for any worsening symptoms or she changes her mind. Clinical Impression     1.  Urinary tract infection without hematuria, site unspecified        Disposition     PATIENT REFERRED TO:  JEVON Rosen  CNP  [address]  [phone]            DISCHARGE MEDICATIONS:  New Prescriptions    NITROFURANTOIN, MACROCRYSTAL-MONOHYDRATE, (MACROBID) 100 MG CAPSULE    Take 1 capsule by mouth 2 times daily for 5 days       DISPOSITION Decision To Discharge 04/17/2019 05:41:41 PM        Martínez Burton MD  04/17/19 [id number]

## 2019-04-17 NOTE — ED NOTES
Patient prepared for and ready to be discharged. Patient discharged at this time in no acute distress after verbalizing understanding of discharge instructions. Patient left after receiving After Visit Summary instructions.           Ashia Thomson RN  04/17/19 [id number]

## 2019-04-19 LAB — URINE CULTURE, ROUTINE: NORMAL

## 2019-04-19 NOTE — PROGRESS NOTES
[facility] ([facility]) [address] [place], [address]    Acknowledgment of Informed Consent for Surgical or Medical Procedure and Sedation  I agree to allow doctor(s) [facility] and his/her associates or assistants, including residents and/or other qualified medical practitioner to perform the following medical treatment or procedure and to administer or direct the administration of sedation as necessary:  Procedure(s): LAPAROSCOPIC VERSUS ROBOTIC VERSUS OPEN RECTOPEXY, POSSIBLE DELORME PROCEDURE, CYSTOSCOPY, BILATERAL URETERAL STENT INSERTION   My doctor has explained the following regarding the proposed procedure:   the explanation of the procedure   the benefits of the procedure   the potential problems that might occur during recuperation   the risks and side effects of the procedure which could include but are not limited to severe blood loss, infection, stroke or death   the benefits, risks and side effect of alternative procedures including the consequences of declining this procedure or any alternative procedures   the likelihood of achieving satisfactory results. I acknowledge no guarantee or assurance has been made to me regarding the results. I understand that during the course of this treatment/procedure, unforeseen conditions can occur which require an additional or different procedure. I agree to allow my physician or assistants to perform such extension of the original procedure as they may find necessary. I understand that sedation will often result in temporary impairment of memory and fine motor skills and that sedation can occasionally progress to a state of deep sedation or general anesthesia. I understand the risks of anesthesia for surgery include, but are not limited to, sore throat, hoarseness, injury to face, mouth, or teeth; nausea; headache; injury to blood vessels or nerves; death, brain damage, or paralysis.     I

## 2019-04-25 ENCOUNTER — ANESTHESIA EVENT (OUTPATIENT)
Dept: OPERATING ROOM | Age: 64
DRG: 330 | End: 2019-04-25
Payer: COMMERCIAL

## 2019-04-25 ENCOUNTER — TELEPHONE (OUTPATIENT)
Dept: SURGERY | Age: 64
End: 2019-04-25

## 2019-04-25 RX ORDER — LOSARTAN POTASSIUM 25 MG/1
25 TABLET ORAL DAILY
COMMUNITY
End: 2020-07-28 | Stop reason: SINTOL

## 2019-04-25 NOTE — ANESTHESIA PRE PROCEDURE
tablet Take 1 tablet by mouth every 12 hours as needed 3/31/16  Yes Dwight Whyte MD   lurasidone (LATUDA) 40 MG TABS tablet Take 1 tablet by mouth daily 3/31/16  Yes Dwight Whyte MD   acetaminophen 650 MG TABS Take 650 mg by mouth every 4 hours as needed 3/22/16  Yes Kerry Bacca, APRN - CNP   ipratropium-albuterol (DUONEB) 0.5-2.5 (3) MG/3ML SOLN nebulizer solution Inhale 3 mLs into the lungs every 4 hours as needed for Shortness of Breath 3/22/16  Yes Kerry Bacca, APRN - CNP   famotidine (PEPCID) 20 MG tablet Take 1 tablet by mouth 2 times daily 3/22/16  Yes Kerry Bacca, APRN - CNP   atorvastatin (LIPITOR) 40 MG tablet Take 40 mg by mouth daily   Yes Historical Provider, MD   glyBURIDE micronized (GLYNASE PRESTAB) 6 MG tablet Take 3 mg by mouth 2 times daily (before meals)    Yes Historical Provider, MD   albuterol sulfate  (90 BASE) MCG/ACT inhaler Inhale 2 puffs into the lungs every 6 hours as needed for Wheezing   Yes Historical Provider, MD   zolpidem (AMBIEN) 10 MG tablet Take 10 mg by mouth nightly as needed. Yes Historical Provider, MD   diphenhydrAMINE (BENADRYL) 25 MG capsule Take 25-50 mg by mouth nightly as needed for Sleep    Yes Historical Provider, MD   sertraline (ZOLOFT) 50 MG tablet Take 50 mg by mouth daily. Yes Historical Provider, MD   lidocaine (XYLOCAINE) 5 % ointment Apply topically 2-3 times daily as needed. 9/25/18   Patrick Negron MD   hydrocortisone (ANUSOL-HC) 25 MG suppository Place 1 suppository rectally daily as needed for Hemorrhoids 9/12/18   Patrick eNgron MD   glucose blood VI test strips (COOL BLOOD GLUCOSE TEST STRIPS) strip 1 each by In Vitro route daily As needed. Historical Provider, MD   naproxen (NAPROSYN) 500 MG tablet Take 500 mg by mouth 2 times daily (with meals)    Historical Provider, MD       Current medications:    No current facility-administered medications for this encounter.       Current Outpatient Medications Medication Sig Dispense Refill    losartan (COZAAR) 25 MG tablet Take 25 mg by mouth daily      polyethylene glycol (GLYCOLAX) powder Take 17 g by mouth daily as needed (Dispense 8.3oz bottle for Bowel Prep day before surgery.) Take day before surgery for bowel prep 510 g 0    docusate sodium (COLACE) 100 MG capsule Take 1 capsule by mouth daily as needed for Constipation (on day prior to surgery, take 2 tablets at noon.) Take 2 tablets at noon, day prior to surgery. 30 capsule 0    metroNIDAZOLE (FLAGYL) 500 MG tablet Take one tablet by mouth 3 times on the day prior to surgery. Take one tablet at 1pm, 2pm and 9pm. 3 tablet 0    neomycin (MYCIFRADIN) 500 MG tablet Take two tablet 3 times the day before surgery.  Take two tablet at 1pm, two tablet at 2pm and two tablet at 9pm. 6 tablet 0    DULoxetine (CYMBALTA) 20 MG extended release capsule Take 20 mg by mouth daily      loperamide (RA ANTI-DIARRHEAL) 2 MG capsule Take 1 capsule by mouth 4 times daily as needed for Diarrhea 90 capsule 5    ondansetron (ZOFRAN) 4 MG tablet Take 1 tablet by mouth every 8 hours as needed for Nausea or Vomiting 20 tablet 0    Multiple Vitamins-Minerals (MULTIVITAMIN PO) Take by mouth      LORazepam (ATIVAN) 0.5 MG tablet Take 1 tablet by mouth every 12 hours as needed 60 tablet 0    lurasidone (LATUDA) 40 MG TABS tablet Take 1 tablet by mouth daily 30 tablet 0    acetaminophen 650 MG TABS Take 650 mg by mouth every 4 hours as needed 120 tablet 3    ipratropium-albuterol (DUONEB) 0.5-2.5 (3) MG/3ML SOLN nebulizer solution Inhale 3 mLs into the lungs every 4 hours as needed for Shortness of Breath 360 mL 0    famotidine (PEPCID) 20 MG tablet Take 1 tablet by mouth 2 times daily 60 tablet 3    atorvastatin (LIPITOR) 40 MG tablet Take 40 mg by mouth daily      glyBURIDE micronized (GLYNASE PRESTAB) 6 MG tablet Take 3 mg by mouth 2 times daily (before meals)       albuterol sulfate  (90 BASE) MCG/ACT inhaler Inhale 2 puffs into the lungs every 6 hours as needed for Wheezing      zolpidem (AMBIEN) 10 MG tablet Take 10 mg by mouth nightly as needed.  diphenhydrAMINE (BENADRYL) 25 MG capsule Take 25-50 mg by mouth nightly as needed for Sleep       sertraline (ZOLOFT) 50 MG tablet Take 50 mg by mouth daily.  lidocaine (XYLOCAINE) 5 % ointment Apply topically 2-3 times daily as needed. 1 Tube 1    hydrocortisone (ANUSOL-HC) 25 MG suppository Place 1 suppository rectally daily as needed for Hemorrhoids 30 suppository 1    glucose blood VI test strips (COOL BLOOD GLUCOSE TEST STRIPS) strip 1 each by In Vitro route daily As needed.       naproxen (NAPROSYN) 500 MG tablet Take 500 mg by mouth 2 times daily (with meals)         Allergies:  No Known Allergies    Problem List:    Patient Active Problem List   Diagnosis Code    Colonic obstruction ([address] K56.609    Respiratory failure following trauma and surgery ([address] J95.821    Ischemic bowel disease ([address] K55.9    Obesity E66.9    Debility R53.81    Type 2 diabetes mellitus without complication, without long-term current use of insulin ([address] E11.9    H/O ileostomy Z98.890    Encephalopathy acute G93.40       Past Medical History:        Diagnosis Date    Anxiety, generalized     Diabetes mellitus ([address]     Diverticulitis     H/O ischemic bowel disease 03/2016    Hepatitis C     History of kidney stones     x 1 episode    Hyperlipidemia     LBBB (left bundle branch block)     Loose stools     secondary to history of colectomy    Paranoid schizophrenia ([address]     Proteinuria     history of    Rectal prolapse     Sciatica     Sleep apnea     USES CPAP       Past Surgical History:        Procedure Laterality Date    ABDOMEN SURGERY  09/20/2016    OPEN ILEOSTOMY TAKEDOWN                   COLONOSCOPY      DILATION AND CURETTAGE OF UTERUS      w/tubaligation    HYSTERECTOMY      PARTIAL VAGINAL    KIDNEY STONE SURGERY      KIDNEY STONE SURGERY      KNEE SURGERY      bilateral knee replacements    OTHER SURGICAL HISTORY  3/5/16    DIAGNOSTIC LAPAROSCOPY, SUBTOTAL COLECTOMY, COLOSTOMY,    LA COLONOSCOPY FLX DX W/COLLJ SPEC WHEN PFRMD N/A 10/25/2018    COLONOSCOPY, POSSIBLE POLYPECTOMY WITH MAC performed by Gianluca Slade MD at 1 [facility] EXTRACTION         Social History:    Social History     Tobacco Use    Smoking status: Former Smoker     Packs/day: 0.00     Types: Cigarettes     Start date: 3/22/2001     Last attempt to quit: 2/22/2016     Years since quitting: 3.1    Smokeless tobacco: Never Used   Substance Use Topics    Alcohol use: No                                Counseling given: Not Answered      Vital Signs (Current):   Vitals:    04/25/19 0754   Weight: 169 lb (76.7 kg)   Height: 4' 11\" (1.499 m)                                              BP Readings from Last 3 Encounters:   04/17/19 (!) 157/95   03/06/19 (!) 144/82   10/25/18 122/71       NPO Status:                                                                                 BMI:   Wt Readings from Last 3 Encounters:   04/17/19 173 lb (78.5 kg)   03/06/19 176 lb (79.8 kg)   10/25/18 185 lb (83.9 kg)     Body mass index is 34.13 kg/m².     CBC:   Lab Results   Component Value Date    WBC 5.9 10/24/2016    RBC 3.67 10/24/2016    HGB 11.5 10/24/2016    HCT 34.6 10/24/2016    MCV 94.3 10/24/2016    RDW 14.4 10/24/2016     10/24/2016       CMP:   Lab Results   Component Value Date     12/06/2018    K 4.5 12/06/2018    CL 97 12/06/2018    CO2 27 12/06/2018    BUN 27 12/06/2018    CREATININE 0.9 12/06/2018    GFRAA >60 12/06/2018    AGRATIO 1.2 06/06/2018    LABGLOM >60 12/06/2018    GLUCOSE 183 12/06/2018    PROT 7.5 06/06/2018    CALCIUM 10.0 12/06/2018    BILITOT <0.2 06/06/2018    ALKPHOS 56 06/06/2018    AST 23 06/06/2018    ALT 27 06/06/2018       POC Tests: No results for input(s): POCGLU, POCNA, POCK, POCCL, POCBUN, POCHEMO, POCHCT in the last 72 hours.    Coags:   Lab Results   Component Value Date    PROTIME 13.5 10/24/2016    INR 1.18 10/24/2016    APTT 28.8 10/23/2016       HCG (If Applicable):   Lab Results   Component Value Date    PREGTESTUR Negative 03/02/2016        ABGs:   Lab Results   Component Value Date    PHART 7.51 03/14/2016    PO2ART 67 03/14/2016    PDU5TXR 46 03/14/2016    IPX0UZO 36 03/14/2016    BEART 11.6 03/14/2016    M2URDPXL 95 03/14/2016        Type & Screen (If Applicable):  No results found for: LABABO, [address]    Anesthesia Evaluation  Patient summary reviewed and Nursing notes reviewed no history of anesthetic complications:   Airway: Mallampati: III  TM distance: >3 FB   Neck ROM: full  Mouth opening: > = 3 FB Dental: normal exam         Pulmonary:normal exam  breath sounds clear to auscultation  (+) sleep apnea: on CPAP,      (-) not a current smoker                          ROS comment: Hx of resp failure   Cardiovascular:    (+) hypertension: moderate, hyperlipidemia      ECG reviewed  Rhythm: regular  Rate: normal                 ROS comment: LBBB      Neuro/Psych:   Negative Neuro/Psych ROS               ROS comment: Sciatica GI/Hepatic/Renal:   (+) hepatitis: C, renal disease: kidney stones, morbid obesity     (-) hiatal hernia and GERD      ROS comment: Rectal Prolapse  Hx of Ileostomy  Hx of ischemic bowel disease  Hep C treated. Endo/Other: Negative Endo/Other ROS   (+) DiabetesType II DM, well controlled, , .                 Abdominal:           Vascular: negative vascular ROS. Anesthesia Plan      general     ASA 3       Induction: intravenous. MIPS: Postoperative opioids intended. Anesthetic plan and risks discussed with patient. Use of blood products discussed with patient whom consented to blood products. Plan discussed with CRNA.     Attending anesthesiologist reviewed and agrees with Kelsey Hong MD   4/25/2019

## 2019-04-25 NOTE — PROGRESS NOTES
option #2 if you have not been preregistered yet. On the day of your procedure bring your insurance card and photo ID. You will be registered at your bedside once brought back to your room. 5. DO NOT EAT ANYTHING eight hours prior to surgery. May have 8 ounces of water 4 hours prior to surgery. 6. MEDICATIONS    Take the following medications with a SMALL sip of water: COZZAR PEPCID BRING CPAP AND INHALER   Use your usual dose of inhalers the morning of surgery. BRING your rescue inhaler with you to hospital.    Anesthesia does NOT want you to take insulin the morning of surgery. They will control your blood sugar while you are at the hospital. Please contact your ordering physician for instructions regarding your insulin the night before your procedure. If you have an insulin pump, please keep it set on basal rate. 7. Do not swallow water when brushing teeth. No gum, candy, mints or ice chips. Refrain from smoking or at least decrease the amount. 8. Dress in loose, comfortable clothing appropriate for redressing after your procedure. Do not wear jewelry (including body piercings), make-up (especially NO eye make-up), fingernail polish (NO toenail polish if foot/leg surgery), lotion, powders or metal hairclips. 9. Dentures, glasses, or contacts will need to be removed before your procedure. Bring cases for your glasses, contacts, dentures, or hearing aids to protect them while you are in surgery. 10. If you use a CPAP, please bring it with you on the day of your procedure. 11. We recommend that valuable personal  belongings such as cash, cell phones, e-tablets or jewelry, be left at home during your stay. The hospital will not be responsible for valuables that are not secured in the hospital safe. However, if your insurance requires a co-pay, you may want to bring a method of payment, i.e. Check or credit card, if you wish to pay your co-pay the day of surgery.       12. If you are to stay overnight, you may bring a bag with personal items. Please have any large items you may need brought in by your family after your arrival to your hospital room. 15. If you have a Living Will or Durable Power of , please bring a copy on the day of your procedure. 15. With your permission, one family member may accompany you while you are being prepared for surgery. Once you are ready, additional family members may join you. HOW WE KEEP YOU SAFE and WORK TO PREVENT SURGICAL SITE INFECTIONS:  1. Health care workers should always check your ID bracelet to verify your name and birth date. You will be asked many times to state your name, date of birth, and allergies. 2. Health care workers should always clean their hands with soap or alcohol gel before providing care to you. It is okay to ask anyone if they cleaned their hands before they touch you. 3. You will be actively involved in verifying the type of procedure you are having and ensuring the correct surgical site. This will be confirmed multiple times prior to your procedure. Do NOT haydee your surgery site UNLESS instructed to by your surgeon. 4. Do not shave or wax for 72 hours prior to procedure near your operative site. Shaving with a razor can irritate your skin and make it easier to develop an infection. On the day of your procedure, any hair that needs to be removed near the surgical site will be clipped by a healthcare worker using a special clippers designed to avoid skin irritation. 5. When you are in the operating room, your surgical site will be cleansed with a special soap, and in most cases, you will be given an antibiotic before the surgery begins. What to expect AFTER YOUR PROCEDURE:  1. Immediately following your procedure, your will be taken to the PACU for the first phase of your recovery.   Your nurse will help you recover from any potential side effects of anesthesia, such as extreme drowsiness, changes in your vital signs or breathing patterns. Nausea, headache, muscle aches, or sore throat may also occur after anesthesia. Your nurse will help you manage these potential side effects. 2. For comfort and safety, arrange to have someone at home with you for the first 24 hours after discharge. 3. You and your family will be given written instructions about your diet, activity, dressing care, medications, and return visits. 4. Once at home, should issues with nausea, pain, or bleeding occur, or should you notice any signs of infection, you should call your surgeon. 5. Always clean your hands before and after caring for your wound. Do not let your family touch your surgery site without cleaning their hands. 6. Narcotic pain medications can cause significant constipation. You may want to add a stool softener to your postoperative medication schedule or speak to your surgeon on how best to manage this SIDE EFFECT. SPECIAL INSTRUCTIONS    Thank you for allowing us to care for you. We strive to exceed your expectations in the delivery of care and service provided to you and your family. If you need to contact us for any reason, please call us at [phone]    Instructions reviewed with patient during preadmission testing phone interview. Monique Lewis. 4/25/2019 .7:59 AM      ADDITIONAL EDUCATIONAL INFORMATION REVIEWED PER PHONE WITH YOU AND/OR YOUR FAMILY:  No Bring a urine sample on day of surgery  Yes Pain Goal-Taking Control of Your Pain  Yes FAQs about Surgical Site Infections  No Hibiclens® Bathing Instructions   Yes Antibacterial Soap  No Faraz® Wipes Bathing Instructions (Obtained from: https://www.[employer]/. pdf )  No Incentive Spirometer Education  No Other

## 2019-04-26 ENCOUNTER — ANESTHESIA (OUTPATIENT)
Dept: OPERATING ROOM | Age: 64
DRG: 330 | End: 2019-04-26
Payer: COMMERCIAL

## 2019-04-26 ENCOUNTER — HOSPITAL ENCOUNTER (INPATIENT)
Age: 64
LOS: 7 days | Discharge: HOME | DRG: 330 | End: 2019-05-03
Attending: SURGERY | Admitting: SURGERY
Payer: COMMERCIAL

## 2019-04-26 VITALS
DIASTOLIC BLOOD PRESSURE: 78 MMHG | OXYGEN SATURATION: 100 % | SYSTOLIC BLOOD PRESSURE: 132 MMHG | RESPIRATION RATE: 17 BRPM | TEMPERATURE: 95.5 F

## 2019-04-26 DIAGNOSIS — K62.3 RECTAL PROLAPSE: Primary | ICD-10-CM

## 2019-04-26 LAB
A/G RATIO: 1.3 (ref 1.1–2.2)
ALBUMIN SERPL-MCNC: 4.5 G/DL (ref 3.4–5)
ALP BLD-CCNC: 55 U/L (ref 40–129)
ALT SERPL-CCNC: 20 U/L (ref 10–40)
ANION GAP SERPL CALCULATED.3IONS-SCNC: 14 MMOL/L (ref 3–16)
AST SERPL-CCNC: 20 U/L (ref 15–37)
BILIRUB SERPL-MCNC: 0.4 MG/DL (ref 0–1)
BUN BLDV-MCNC: 17 MG/DL (ref 7–20)
CALCIUM SERPL-MCNC: 10.1 MG/DL (ref 8.3–10.6)
CHLORIDE BLD-SCNC: 101 MMOL/L (ref 99–110)
CO2: 22 MMOL/L (ref 21–32)
CREAT SERPL-MCNC: 0.9 MG/DL (ref 0.6–1.2)
GFR AFRICAN AMERICAN: >60
GFR NON-AFRICAN AMERICAN: >60
GLOBULIN: 3.6 G/DL
GLUCOSE BLD-MCNC: 174 MG/DL (ref 70–99)
GLUCOSE BLD-MCNC: 185 MG/DL (ref 70–99)
GLUCOSE BLD-MCNC: 216 MG/DL (ref 70–99)
GLUCOSE BLD-MCNC: 281 MG/DL (ref 70–99)
GLUCOSE BLD-MCNC: 287 MG/DL (ref 70–99)
GLUCOSE BLD-MCNC: 307 MG/DL (ref 70–99)
HCT VFR BLD CALC: 39.8 % (ref 36–48)
HEMOGLOBIN: 13.4 G/DL (ref 12–16)
INR BLD: 1.14 (ref 0.86–1.14)
MCH RBC QN AUTO: 33 PG (ref 26–34)
MCHC RBC AUTO-ENTMCNC: 33.7 G/DL (ref 31–36)
MCV RBC AUTO: 97.9 FL (ref 80–100)
PDW BLD-RTO: 12.9 % (ref 12.4–15.4)
PERFORMED ON: ABNORMAL
PLATELET # BLD: 267 K/UL (ref 135–450)
PMV BLD AUTO: 7.1 FL (ref 5–10.5)
POTASSIUM SERPL-SCNC: 4.8 MMOL/L (ref 3.5–5.1)
PROTHROMBIN TIME: 13 SEC (ref 9.8–13)
RBC # BLD: 4.07 M/UL (ref 4–5.2)
SODIUM BLD-SCNC: 137 MMOL/L (ref 136–145)
TOTAL PROTEIN: 8.1 G/DL (ref 6.4–8.2)
WBC # BLD: 6 K/UL (ref 4–11)

## 2019-04-26 PROCEDURE — C1769 GUIDE WIRE: HCPCS | Performed by: SURGERY

## 2019-04-26 PROCEDURE — 85027 COMPLETE CBC AUTOMATED: CPT

## 2019-04-26 PROCEDURE — 94761 N-INVAS EAR/PLS OXIMETRY MLT: CPT

## 2019-04-26 PROCEDURE — C9290 INJ, BUPIVACAINE LIPOSOME: HCPCS | Performed by: SURGERY

## 2019-04-26 PROCEDURE — 6360000002 HC RX W HCPCS: Performed by: NURSE ANESTHETIST, CERTIFIED REGISTERED

## 2019-04-26 PROCEDURE — 2500000003 HC RX 250 WO HCPCS: Performed by: NURSE ANESTHETIST, CERTIFIED REGISTERED

## 2019-04-26 PROCEDURE — 2580000003 HC RX 258: Performed by: SURGERY

## 2019-04-26 PROCEDURE — 6370000000 HC RX 637 (ALT 250 FOR IP): Performed by: STUDENT IN AN ORGANIZED HEALTH CARE EDUCATION/TRAINING PROGRAM

## 2019-04-26 PROCEDURE — 1200000000 HC SEMI PRIVATE

## 2019-04-26 PROCEDURE — 2720000010 HC SURG SUPPLY STERILE: Performed by: SURGERY

## 2019-04-26 PROCEDURE — 6370000000 HC RX 637 (ALT 250 FOR IP)

## 2019-04-26 PROCEDURE — 2580000003 HC RX 258: Performed by: ANESTHESIOLOGY

## 2019-04-26 PROCEDURE — 6360000002 HC RX W HCPCS: Performed by: SURGERY

## 2019-04-26 PROCEDURE — 2700000000 HC OXYGEN THERAPY PER DAY

## 2019-04-26 PROCEDURE — 80053 COMPREHEN METABOLIC PANEL: CPT

## 2019-04-26 PROCEDURE — 6360000002 HC RX W HCPCS: Performed by: STUDENT IN AN ORGANIZED HEALTH CARE EDUCATION/TRAINING PROGRAM

## 2019-04-26 PROCEDURE — 2500000003 HC RX 250 WO HCPCS: Performed by: SURGERY

## 2019-04-26 PROCEDURE — 3700000000 HC ANESTHESIA ATTENDED CARE: Performed by: SURGERY

## 2019-04-26 PROCEDURE — 94664 DEMO&/EVAL PT USE INHALER: CPT

## 2019-04-26 PROCEDURE — 3700000001 HC ADD 15 MINUTES (ANESTHESIA): Performed by: SURGERY

## 2019-04-26 PROCEDURE — 88305 TISSUE EXAM BY PATHOLOGIST: CPT

## 2019-04-26 PROCEDURE — 7100000001 HC PACU RECOVERY - ADDTL 15 MIN: Performed by: SURGERY

## 2019-04-26 PROCEDURE — 7100000000 HC PACU RECOVERY - FIRST 15 MIN: Performed by: SURGERY

## 2019-04-26 PROCEDURE — 2580000003 HC RX 258: Performed by: STUDENT IN AN ORGANIZED HEALTH CARE EDUCATION/TRAINING PROGRAM

## 2019-04-26 PROCEDURE — 2580000003 HC RX 258: Performed by: NURSE ANESTHETIST, CERTIFIED REGISTERED

## 2019-04-26 PROCEDURE — 85610 PROTHROMBIN TIME: CPT

## 2019-04-26 PROCEDURE — 2709999900 HC NON-CHARGEABLE SUPPLY: Performed by: SURGERY

## 2019-04-26 PROCEDURE — C1758 CATHETER, URETERAL: HCPCS | Performed by: SURGERY

## 2019-04-26 PROCEDURE — C2627 CATH, SUPRAPUBIC/CYSTOSCOPIC: HCPCS | Performed by: SURGERY

## 2019-04-26 PROCEDURE — 45540 CORRECT RECTAL PROLAPSE: CPT | Performed by: SURGERY

## 2019-04-26 PROCEDURE — 2580000003 HC RX 258: Performed by: UROLOGY

## 2019-04-26 PROCEDURE — 3600000004 HC SURGERY LEVEL 4 BASE: Performed by: SURGERY

## 2019-04-26 PROCEDURE — 2500000003 HC RX 250 WO HCPCS: Performed by: STUDENT IN AN ORGANIZED HEALTH CARE EDUCATION/TRAINING PROGRAM

## 2019-04-26 PROCEDURE — 3600000014 HC SURGERY LEVEL 4 ADDTL 15MIN: Performed by: SURGERY

## 2019-04-26 RX ORDER — FENTANYL CITRATE 50 UG/ML
INJECTION, SOLUTION INTRAMUSCULAR; INTRAVENOUS PRN
Status: DISCONTINUED | OUTPATIENT
Start: 2019-04-26 | End: 2019-04-26 | Stop reason: SDUPTHER

## 2019-04-26 RX ORDER — ONDANSETRON 2 MG/ML
4 INJECTION INTRAMUSCULAR; INTRAVENOUS
Status: DISCONTINUED | OUTPATIENT
Start: 2019-04-26 | End: 2019-04-26 | Stop reason: HOSPADM

## 2019-04-26 RX ORDER — FENTANYL CITRATE 50 UG/ML
25 INJECTION, SOLUTION INTRAMUSCULAR; INTRAVENOUS EVERY 5 MIN PRN
Status: DISCONTINUED | OUTPATIENT
Start: 2019-04-26 | End: 2019-04-26

## 2019-04-26 RX ORDER — ONDANSETRON 2 MG/ML
INJECTION INTRAMUSCULAR; INTRAVENOUS PRN
Status: DISCONTINUED | OUTPATIENT
Start: 2019-04-26 | End: 2019-04-26 | Stop reason: SDUPTHER

## 2019-04-26 RX ORDER — ZOLPIDEM TARTRATE 5 MG/1
10 TABLET ORAL NIGHTLY PRN
Status: DISCONTINUED | OUTPATIENT
Start: 2019-04-26 | End: 2019-05-03 | Stop reason: HOSPADM

## 2019-04-26 RX ORDER — ROCURONIUM BROMIDE 10 MG/ML
INJECTION, SOLUTION INTRAVENOUS PRN
Status: DISCONTINUED | OUTPATIENT
Start: 2019-04-26 | End: 2019-04-26 | Stop reason: SDUPTHER

## 2019-04-26 RX ORDER — PROPOFOL 10 MG/ML
INJECTION, EMULSION INTRAVENOUS PRN
Status: DISCONTINUED | OUTPATIENT
Start: 2019-04-26 | End: 2019-04-26 | Stop reason: SDUPTHER

## 2019-04-26 RX ORDER — SODIUM CHLORIDE, SODIUM LACTATE, POTASSIUM CHLORIDE, CALCIUM CHLORIDE 600; 310; 30; 20 MG/100ML; MG/100ML; MG/100ML; MG/100ML
INJECTION, SOLUTION INTRAVENOUS CONTINUOUS
Status: DISCONTINUED | OUTPATIENT
Start: 2019-04-26 | End: 2019-04-27

## 2019-04-26 RX ORDER — PROMETHAZINE HYDROCHLORIDE 25 MG/ML
6.25 INJECTION, SOLUTION INTRAMUSCULAR; INTRAVENOUS
Status: DISCONTINUED | OUTPATIENT
Start: 2019-04-26 | End: 2019-04-26

## 2019-04-26 RX ORDER — MORPHINE SULFATE 4 MG/ML
4 INJECTION, SOLUTION INTRAMUSCULAR; INTRAVENOUS
Status: DISCONTINUED | OUTPATIENT
Start: 2019-04-26 | End: 2019-04-27

## 2019-04-26 RX ORDER — NICOTINE POLACRILEX 4 MG
15 LOZENGE BUCCAL PRN
Status: DISCONTINUED | OUTPATIENT
Start: 2019-04-26 | End: 2019-05-03 | Stop reason: HOSPADM

## 2019-04-26 RX ORDER — MAGNESIUM HYDROXIDE 1200 MG/15ML
LIQUID ORAL CONTINUOUS PRN
Status: COMPLETED | OUTPATIENT
Start: 2019-04-26 | End: 2019-04-26

## 2019-04-26 RX ORDER — FENTANYL CITRATE 50 UG/ML
50 INJECTION, SOLUTION INTRAMUSCULAR; INTRAVENOUS EVERY 5 MIN PRN
Status: DISCONTINUED | OUTPATIENT
Start: 2019-04-26 | End: 2019-04-26

## 2019-04-26 RX ORDER — MORPHINE SULFATE 2 MG/ML
2 INJECTION, SOLUTION INTRAMUSCULAR; INTRAVENOUS ONCE
Status: DISCONTINUED | OUTPATIENT
Start: 2019-04-26 | End: 2019-05-01

## 2019-04-26 RX ORDER — EPHEDRINE SULFATE 50 MG/ML
INJECTION, SOLUTION INTRAVENOUS PRN
Status: DISCONTINUED | OUTPATIENT
Start: 2019-04-26 | End: 2019-04-26 | Stop reason: SDUPTHER

## 2019-04-26 RX ORDER — SODIUM CHLORIDE, SODIUM LACTATE, POTASSIUM CHLORIDE, AND CALCIUM CHLORIDE .6; .31; .03; .02 G/100ML; G/100ML; G/100ML; G/100ML
IRRIGANT IRRIGATION PRN
Status: DISCONTINUED | OUTPATIENT
Start: 2019-04-26 | End: 2019-04-26 | Stop reason: ALTCHOICE

## 2019-04-26 RX ORDER — DEXTROSE MONOHYDRATE 25 G/50ML
12.5 INJECTION, SOLUTION INTRAVENOUS PRN
Status: DISCONTINUED | OUTPATIENT
Start: 2019-04-26 | End: 2019-05-03 | Stop reason: HOSPADM

## 2019-04-26 RX ORDER — BUPIVACAINE HYDROCHLORIDE AND EPINEPHRINE 5; 5 MG/ML; UG/ML
INJECTION, SOLUTION EPIDURAL; INTRACAUDAL; PERINEURAL PRN
Status: DISCONTINUED | OUTPATIENT
Start: 2019-04-26 | End: 2019-04-26 | Stop reason: ALTCHOICE

## 2019-04-26 RX ORDER — SODIUM CHLORIDE 0.9 % (FLUSH) 0.9 %
10 SYRINGE (ML) INJECTION PRN
Status: DISCONTINUED | OUTPATIENT
Start: 2019-04-26 | End: 2019-05-03 | Stop reason: HOSPADM

## 2019-04-26 RX ORDER — HYDRALAZINE HYDROCHLORIDE 20 MG/ML
5 INJECTION INTRAMUSCULAR; INTRAVENOUS EVERY 6 HOURS PRN
Status: DISCONTINUED | OUTPATIENT
Start: 2019-04-26 | End: 2019-05-03 | Stop reason: HOSPADM

## 2019-04-26 RX ORDER — ALBUTEROL SULFATE 2.5 MG/3ML
2.5 SOLUTION RESPIRATORY (INHALATION) EVERY 6 HOURS PRN
Status: DISCONTINUED | OUTPATIENT
Start: 2019-04-26 | End: 2019-05-03 | Stop reason: HOSPADM

## 2019-04-26 RX ORDER — MORPHINE SULFATE 4 MG/ML
1 INJECTION, SOLUTION INTRAMUSCULAR; INTRAVENOUS EVERY 5 MIN PRN
Status: DISCONTINUED | OUTPATIENT
Start: 2019-04-26 | End: 2019-04-26 | Stop reason: HOSPADM

## 2019-04-26 RX ORDER — FENTANYL CITRATE 50 UG/ML
50 INJECTION, SOLUTION INTRAMUSCULAR; INTRAVENOUS EVERY 5 MIN PRN
Status: DISCONTINUED | OUTPATIENT
Start: 2019-04-26 | End: 2019-04-26 | Stop reason: HOSPADM

## 2019-04-26 RX ORDER — ACETAMINOPHEN 10 MG/ML
1000 INJECTION, SOLUTION INTRAVENOUS EVERY 6 HOURS
Status: COMPLETED | OUTPATIENT
Start: 2019-04-26 | End: 2019-04-28

## 2019-04-26 RX ORDER — MORPHINE SULFATE 2 MG/ML
2 INJECTION, SOLUTION INTRAMUSCULAR; INTRAVENOUS
Status: DISCONTINUED | OUTPATIENT
Start: 2019-04-26 | End: 2019-04-27

## 2019-04-26 RX ORDER — SODIUM CHLORIDE, SODIUM LACTATE, POTASSIUM CHLORIDE, CALCIUM CHLORIDE 600; 310; 30; 20 MG/100ML; MG/100ML; MG/100ML; MG/100ML
INJECTION, SOLUTION INTRAVENOUS CONTINUOUS
Status: DISCONTINUED | OUTPATIENT
Start: 2019-04-26 | End: 2019-04-26

## 2019-04-26 RX ORDER — PROMETHAZINE HYDROCHLORIDE 25 MG/ML
6.25 INJECTION, SOLUTION INTRAMUSCULAR; INTRAVENOUS
Status: DISCONTINUED | OUTPATIENT
Start: 2019-04-26 | End: 2019-04-26 | Stop reason: HOSPADM

## 2019-04-26 RX ORDER — MORPHINE SULFATE 10 MG/ML
INJECTION, SOLUTION INTRAMUSCULAR; INTRAVENOUS PRN
Status: DISCONTINUED | OUTPATIENT
Start: 2019-04-26 | End: 2019-04-26 | Stop reason: SDUPTHER

## 2019-04-26 RX ORDER — DEXTROSE MONOHYDRATE 50 MG/ML
100 INJECTION, SOLUTION INTRAVENOUS PRN
Status: DISCONTINUED | OUTPATIENT
Start: 2019-04-26 | End: 2019-05-03 | Stop reason: HOSPADM

## 2019-04-26 RX ORDER — MORPHINE SULFATE 4 MG/ML
2 INJECTION, SOLUTION INTRAMUSCULAR; INTRAVENOUS EVERY 5 MIN PRN
Status: DISCONTINUED | OUTPATIENT
Start: 2019-04-26 | End: 2019-04-26 | Stop reason: HOSPADM

## 2019-04-26 RX ORDER — SODIUM CHLORIDE, SODIUM LACTATE, POTASSIUM CHLORIDE, CALCIUM CHLORIDE 600; 310; 30; 20 MG/100ML; MG/100ML; MG/100ML; MG/100ML
INJECTION, SOLUTION INTRAVENOUS CONTINUOUS PRN
Status: DISCONTINUED | OUTPATIENT
Start: 2019-04-26 | End: 2019-04-26 | Stop reason: SDUPTHER

## 2019-04-26 RX ORDER — SODIUM CHLORIDE 0.9 % (FLUSH) 0.9 %
10 SYRINGE (ML) INJECTION EVERY 12 HOURS SCHEDULED
Status: DISCONTINUED | OUTPATIENT
Start: 2019-04-26 | End: 2019-05-03 | Stop reason: HOSPADM

## 2019-04-26 RX ORDER — ONDANSETRON 2 MG/ML
4 INJECTION INTRAMUSCULAR; INTRAVENOUS EVERY 6 HOURS PRN
Status: DISCONTINUED | OUTPATIENT
Start: 2019-04-26 | End: 2019-05-03 | Stop reason: HOSPADM

## 2019-04-26 RX ORDER — DULOXETIN HYDROCHLORIDE 20 MG/1
20 CAPSULE, DELAYED RELEASE ORAL DAILY
Status: DISCONTINUED | OUTPATIENT
Start: 2019-04-26 | End: 2019-05-03 | Stop reason: HOSPADM

## 2019-04-26 RX ORDER — LIDOCAINE HYDROCHLORIDE 20 MG/ML
INJECTION, SOLUTION INTRAVENOUS PRN
Status: DISCONTINUED | OUTPATIENT
Start: 2019-04-26 | End: 2019-04-26 | Stop reason: SDUPTHER

## 2019-04-26 RX ORDER — MAGNESIUM HYDROXIDE 1200 MG/15ML
LIQUID ORAL CONTINUOUS PRN
Status: DISCONTINUED | OUTPATIENT
Start: 2019-04-26 | End: 2019-04-26 | Stop reason: ALTCHOICE

## 2019-04-26 RX ORDER — FENTANYL CITRATE 50 UG/ML
25 INJECTION, SOLUTION INTRAMUSCULAR; INTRAVENOUS EVERY 5 MIN PRN
Status: DISCONTINUED | OUTPATIENT
Start: 2019-04-26 | End: 2019-04-26 | Stop reason: HOSPADM

## 2019-04-26 RX ORDER — MIDAZOLAM HYDROCHLORIDE 1 MG/ML
INJECTION INTRAMUSCULAR; INTRAVENOUS PRN
Status: DISCONTINUED | OUTPATIENT
Start: 2019-04-26 | End: 2019-04-26 | Stop reason: SDUPTHER

## 2019-04-26 RX ORDER — ONDANSETRON 2 MG/ML
4 INJECTION INTRAMUSCULAR; INTRAVENOUS
Status: DISCONTINUED | OUTPATIENT
Start: 2019-04-26 | End: 2019-04-26

## 2019-04-26 RX ORDER — GLYCOPYRROLATE 1 MG/5 ML
SYRINGE (ML) INTRAVENOUS PRN
Status: DISCONTINUED | OUTPATIENT
Start: 2019-04-26 | End: 2019-04-26 | Stop reason: SDUPTHER

## 2019-04-26 RX ORDER — IPRATROPIUM BROMIDE AND ALBUTEROL SULFATE 2.5; .5 MG/3ML; MG/3ML
3 SOLUTION RESPIRATORY (INHALATION) EVERY 4 HOURS PRN
Status: DISCONTINUED | OUTPATIENT
Start: 2019-04-26 | End: 2019-05-03 | Stop reason: HOSPADM

## 2019-04-26 RX ADMIN — ACETAMINOPHEN 1000 MG: 10 INJECTION, SOLUTION INTRAVENOUS at 16:04

## 2019-04-26 RX ADMIN — FENTANYL CITRATE 50 MCG: 50 INJECTION INTRAMUSCULAR; INTRAVENOUS at 07:37

## 2019-04-26 RX ADMIN — MORPHINE SULFATE 3 MG: 10 INJECTION, SOLUTION INTRAMUSCULAR; INTRAVENOUS at 14:18

## 2019-04-26 RX ADMIN — FENTANYL CITRATE 50 MCG: 50 INJECTION INTRAMUSCULAR; INTRAVENOUS at 07:41

## 2019-04-26 RX ADMIN — EPHEDRINE SULFATE 5 MG: 50 INJECTION, SOLUTION INTRAMUSCULAR; INTRAVENOUS; SUBCUTANEOUS at 07:56

## 2019-04-26 RX ADMIN — CEFOXITIN SODIUM 2 G: 2 POWDER, FOR SOLUTION INTRAVENOUS at 07:32

## 2019-04-26 RX ADMIN — INSULIN LISPRO 6 UNITS: 100 INJECTION, SOLUTION INTRAVENOUS; SUBCUTANEOUS at 22:30

## 2019-04-26 RX ADMIN — MORPHINE SULFATE 3 MG: 10 INJECTION, SOLUTION INTRAMUSCULAR; INTRAVENOUS at 14:20

## 2019-04-26 RX ADMIN — SODIUM CHLORIDE, SODIUM LACTATE, POTASSIUM CHLORIDE, AND CALCIUM CHLORIDE: 600; 310; 30; 20 INJECTION, SOLUTION INTRAVENOUS at 06:36

## 2019-04-26 RX ADMIN — SODIUM CHLORIDE, SODIUM LACTATE, POTASSIUM CHLORIDE, AND CALCIUM CHLORIDE: 600; 310; 30; 20 INJECTION, SOLUTION INTRAVENOUS at 19:59

## 2019-04-26 RX ADMIN — ENOXAPARIN SODIUM 30 MG: 30 INJECTION SUBCUTANEOUS at 06:53

## 2019-04-26 RX ADMIN — ACETAMINOPHEN 1000 MG: 10 INJECTION, SOLUTION INTRAVENOUS at 22:20

## 2019-04-26 RX ADMIN — LURASIDONE HYDROCHLORIDE 40 MG: 40 TABLET, FILM COATED ORAL at 19:34

## 2019-04-26 RX ADMIN — ONDANSETRON 4 MG: 2 INJECTION INTRAMUSCULAR; INTRAVENOUS at 08:40

## 2019-04-26 RX ADMIN — MIDAZOLAM HYDROCHLORIDE 1 MG: 2 INJECTION, SOLUTION INTRAMUSCULAR; INTRAVENOUS at 07:32

## 2019-04-26 RX ADMIN — ROCURONIUM BROMIDE 30 MG: 10 INJECTION, SOLUTION INTRAVENOUS at 13:17

## 2019-04-26 RX ADMIN — ROCURONIUM BROMIDE 30 MG: 10 INJECTION, SOLUTION INTRAVENOUS at 10:33

## 2019-04-26 RX ADMIN — MORPHINE SULFATE 2 MG: 2 INJECTION, SOLUTION INTRAMUSCULAR; INTRAVENOUS at 18:10

## 2019-04-26 RX ADMIN — SODIUM CHLORIDE, SODIUM LACTATE, POTASSIUM CHLORIDE, AND CALCIUM CHLORIDE: 600; 310; 30; 20 INJECTION, SOLUTION INTRAVENOUS at 09:12

## 2019-04-26 RX ADMIN — MORPHINE SULFATE 2 MG: 10 INJECTION, SOLUTION INTRAMUSCULAR; INTRAVENOUS at 12:16

## 2019-04-26 RX ADMIN — HYDRALAZINE HYDROCHLORIDE 5 MG: 20 INJECTION INTRAMUSCULAR; INTRAVENOUS at 23:39

## 2019-04-26 RX ADMIN — Medication 0.2 MG: at 08:40

## 2019-04-26 RX ADMIN — PROPOFOL 50 MG: 10 INJECTION, EMULSION INTRAVENOUS at 07:50

## 2019-04-26 RX ADMIN — PHENYLEPHRINE HYDROCHLORIDE 25 MCG/MIN: 10 INJECTION, SOLUTION INTRAMUSCULAR; INTRAVENOUS; SUBCUTANEOUS at 08:00

## 2019-04-26 RX ADMIN — ROCURONIUM BROMIDE 20 MG: 10 INJECTION, SOLUTION INTRAVENOUS at 11:50

## 2019-04-26 RX ADMIN — FENTANYL CITRATE 50 MCG: 50 INJECTION INTRAMUSCULAR; INTRAVENOUS at 14:28

## 2019-04-26 RX ADMIN — INSULIN LISPRO 3 UNITS: 100 INJECTION, SOLUTION INTRAVENOUS; SUBCUTANEOUS at 22:31

## 2019-04-26 RX ADMIN — MORPHINE SULFATE 2 MG: 2 INJECTION, SOLUTION INTRAMUSCULAR; INTRAVENOUS at 18:09

## 2019-04-26 RX ADMIN — SODIUM CHLORIDE, SODIUM LACTATE, POTASSIUM CHLORIDE, AND CALCIUM CHLORIDE: 600; 310; 30; 20 INJECTION, SOLUTION INTRAVENOUS at 07:44

## 2019-04-26 RX ADMIN — INSULIN LISPRO 8 UNITS: 100 INJECTION, SOLUTION INTRAVENOUS; SUBCUTANEOUS at 20:10

## 2019-04-26 RX ADMIN — LIDOCAINE HYDROCHLORIDE 100 MG: 20 INJECTION, SOLUTION INTRAVENOUS at 07:41

## 2019-04-26 RX ADMIN — EPHEDRINE SULFATE 5 MG: 50 INJECTION, SOLUTION INTRAMUSCULAR; INTRAVENOUS; SUBCUTANEOUS at 07:59

## 2019-04-26 RX ADMIN — FENTANYL CITRATE 50 MCG: 50 INJECTION INTRAMUSCULAR; INTRAVENOUS at 14:33

## 2019-04-26 RX ADMIN — SODIUM CHLORIDE, SODIUM LACTATE, POTASSIUM CHLORIDE, AND CALCIUM CHLORIDE: 600; 310; 30; 20 INJECTION, SOLUTION INTRAVENOUS at 17:55

## 2019-04-26 RX ADMIN — MORPHINE SULFATE 2 MG: 2 INJECTION, SOLUTION INTRAMUSCULAR; INTRAVENOUS at 20:00

## 2019-04-26 RX ADMIN — MORPHINE SULFATE 3 MG: 10 INJECTION, SOLUTION INTRAMUSCULAR; INTRAVENOUS at 10:52

## 2019-04-26 RX ADMIN — ROCURONIUM BROMIDE 20 MG: 10 INJECTION, SOLUTION INTRAVENOUS at 08:12

## 2019-04-26 RX ADMIN — MORPHINE SULFATE 3 MG: 10 INJECTION, SOLUTION INTRAMUSCULAR; INTRAVENOUS at 11:50

## 2019-04-26 RX ADMIN — SERTRALINE HYDROCHLORIDE 50 MG: 50 TABLET ORAL at 18:10

## 2019-04-26 RX ADMIN — FENTANYL CITRATE 100 MCG: 50 INJECTION INTRAMUSCULAR; INTRAVENOUS at 08:38

## 2019-04-26 RX ADMIN — MORPHINE SULFATE 5 MG: 10 INJECTION, SOLUTION INTRAMUSCULAR; INTRAVENOUS at 14:25

## 2019-04-26 RX ADMIN — PROPOFOL 100 MG: 10 INJECTION, EMULSION INTRAVENOUS at 07:41

## 2019-04-26 RX ADMIN — MIDAZOLAM HYDROCHLORIDE 1 MG: 2 INJECTION, SOLUTION INTRAMUSCULAR; INTRAVENOUS at 07:41

## 2019-04-26 RX ADMIN — ROCURONIUM BROMIDE 50 MG: 10 INJECTION, SOLUTION INTRAVENOUS at 07:41

## 2019-04-26 RX ADMIN — EPHEDRINE SULFATE 5 MG: 50 INJECTION, SOLUTION INTRAMUSCULAR; INTRAVENOUS; SUBCUTANEOUS at 09:33

## 2019-04-26 RX ADMIN — MORPHINE SULFATE 2 MG: 10 INJECTION, SOLUTION INTRAMUSCULAR; INTRAVENOUS at 14:13

## 2019-04-26 RX ADMIN — EPHEDRINE SULFATE 5 MG: 50 INJECTION, SOLUTION INTRAMUSCULAR; INTRAVENOUS; SUBCUTANEOUS at 08:02

## 2019-04-26 RX ADMIN — SODIUM CHLORIDE, SODIUM LACTATE, POTASSIUM CHLORIDE, AND CALCIUM CHLORIDE: 600; 310; 30; 20 INJECTION, SOLUTION INTRAVENOUS at 14:18

## 2019-04-26 RX ADMIN — DULOXETINE HYDROCHLORIDE 20 MG: 20 CAPSULE, DELAYED RELEASE ORAL at 18:10

## 2019-04-26 RX ADMIN — PROPOFOL 50 MG: 10 INJECTION, EMULSION INTRAVENOUS at 07:47

## 2019-04-26 RX ADMIN — FAMOTIDINE 20 MG: 10 INJECTION, SOLUTION INTRAVENOUS at 18:09

## 2019-04-26 RX ADMIN — ROCURONIUM BROMIDE 30 MG: 10 INJECTION, SOLUTION INTRAVENOUS at 09:14

## 2019-04-26 RX ADMIN — MORPHINE SULFATE 4 MG: 4 INJECTION, SOLUTION INTRAMUSCULAR; INTRAVENOUS at 22:53

## 2019-04-26 ASSESSMENT — PULMONARY FUNCTION TESTS
PIF_VALUE: 26
PIF_VALUE: 33
PIF_VALUE: 32
PIF_VALUE: 26
PIF_VALUE: 26
PIF_VALUE: 29
PIF_VALUE: 29
PIF_VALUE: 26
PIF_VALUE: 27
PIF_VALUE: 35
PIF_VALUE: 18
PIF_VALUE: 34
PIF_VALUE: 26
PIF_VALUE: 33
PIF_VALUE: 26
PIF_VALUE: 11
PIF_VALUE: 28
PIF_VALUE: 25
PIF_VALUE: 32
PIF_VALUE: 34
PIF_VALUE: 29
PIF_VALUE: 14
PIF_VALUE: 20
PIF_VALUE: 26
PIF_VALUE: 33
PIF_VALUE: 24
PIF_VALUE: 27
PIF_VALUE: 14
PIF_VALUE: 25
PIF_VALUE: 27
PIF_VALUE: 4
PIF_VALUE: 18
PIF_VALUE: 35
PIF_VALUE: 27
PIF_VALUE: 19
PIF_VALUE: 25
PIF_VALUE: 29
PIF_VALUE: 33
PIF_VALUE: 29
PIF_VALUE: 19
PIF_VALUE: 34
PIF_VALUE: 25
PIF_VALUE: 34
PIF_VALUE: 27
PIF_VALUE: 15
PIF_VALUE: 34
PIF_VALUE: 20
PIF_VALUE: 34
PIF_VALUE: 27
PIF_VALUE: 16
PIF_VALUE: 19
PIF_VALUE: 25
PIF_VALUE: 17
PIF_VALUE: 25
PIF_VALUE: 33
PIF_VALUE: 29
PIF_VALUE: 25
PIF_VALUE: 27
PIF_VALUE: 34
PIF_VALUE: 29
PIF_VALUE: 26
PIF_VALUE: 29
PIF_VALUE: 34
PIF_VALUE: 33
PIF_VALUE: 28
PIF_VALUE: 29
PIF_VALUE: 18
PIF_VALUE: 35
PIF_VALUE: 26
PIF_VALUE: 28
PIF_VALUE: 25
PIF_VALUE: 18
PIF_VALUE: 34
PIF_VALUE: 0
PIF_VALUE: 31
PIF_VALUE: 28
PIF_VALUE: 20
PIF_VALUE: 25
PIF_VALUE: 21
PIF_VALUE: 33
PIF_VALUE: 27
PIF_VALUE: 25
PIF_VALUE: 13
PIF_VALUE: 34
PIF_VALUE: 17
PIF_VALUE: 34
PIF_VALUE: 26
PIF_VALUE: 27
PIF_VALUE: 34
PIF_VALUE: 19
PIF_VALUE: 27
PIF_VALUE: 24
PIF_VALUE: 25
PIF_VALUE: 27
PIF_VALUE: 27
PIF_VALUE: 28
PIF_VALUE: 26
PIF_VALUE: 20
PIF_VALUE: 32
PIF_VALUE: 33
PIF_VALUE: 34
PIF_VALUE: 29
PIF_VALUE: 29
PIF_VALUE: 25
PIF_VALUE: 27
PIF_VALUE: 27
PIF_VALUE: 25
PIF_VALUE: 18
PIF_VALUE: 28
PIF_VALUE: 14
PIF_VALUE: 26
PIF_VALUE: 28
PIF_VALUE: 26
PIF_VALUE: 27
PIF_VALUE: 25
PIF_VALUE: 26
PIF_VALUE: 24
PIF_VALUE: 31
PIF_VALUE: 25
PIF_VALUE: 27
PIF_VALUE: 18
PIF_VALUE: 20
PIF_VALUE: 26
PIF_VALUE: 20
PIF_VALUE: 34
PIF_VALUE: 26
PIF_VALUE: 25
PIF_VALUE: 27
PIF_VALUE: 25
PIF_VALUE: 26
PIF_VALUE: 19
PIF_VALUE: 34
PIF_VALUE: 21
PIF_VALUE: 32
PIF_VALUE: 29
PIF_VALUE: 26
PIF_VALUE: 34
PIF_VALUE: 28
PIF_VALUE: 1
PIF_VALUE: 33
PIF_VALUE: 28
PIF_VALUE: 34
PIF_VALUE: 29
PIF_VALUE: 27
PIF_VALUE: 6
PIF_VALUE: 18
PIF_VALUE: 33
PIF_VALUE: 24
PIF_VALUE: 27
PIF_VALUE: 1
PIF_VALUE: 25
PIF_VALUE: 34
PIF_VALUE: 31
PIF_VALUE: 26
PIF_VALUE: 26
PIF_VALUE: 35
PIF_VALUE: 26
PIF_VALUE: 33
PIF_VALUE: 28
PIF_VALUE: 28
PIF_VALUE: 34
PIF_VALUE: 29
PIF_VALUE: 19
PIF_VALUE: 29
PIF_VALUE: 24
PIF_VALUE: 26
PIF_VALUE: 24
PIF_VALUE: 1
PIF_VALUE: 25
PIF_VALUE: 35
PIF_VALUE: 24
PIF_VALUE: 26
PIF_VALUE: 32
PIF_VALUE: 25
PIF_VALUE: 34
PIF_VALUE: 27
PIF_VALUE: 28
PIF_VALUE: 24
PIF_VALUE: 27
PIF_VALUE: 26
PIF_VALUE: 29
PIF_VALUE: 14
PIF_VALUE: 29
PIF_VALUE: 25
PIF_VALUE: 14
PIF_VALUE: 33
PIF_VALUE: 27
PIF_VALUE: 14
PIF_VALUE: 32
PIF_VALUE: 27
PIF_VALUE: 29
PIF_VALUE: 25
PIF_VALUE: 25
PIF_VALUE: 26
PIF_VALUE: 32
PIF_VALUE: 26
PIF_VALUE: 27
PIF_VALUE: 32
PIF_VALUE: 34
PIF_VALUE: 27
PIF_VALUE: 26
PIF_VALUE: 34
PIF_VALUE: 25
PIF_VALUE: 34
PIF_VALUE: 27
PIF_VALUE: 34
PIF_VALUE: 20
PIF_VALUE: 28
PIF_VALUE: 4
PIF_VALUE: 26
PIF_VALUE: 34
PIF_VALUE: 34
PIF_VALUE: 26
PIF_VALUE: 34
PIF_VALUE: 0
PIF_VALUE: 34
PIF_VALUE: 33
PIF_VALUE: 27
PIF_VALUE: 25
PIF_VALUE: 27
PIF_VALUE: 4
PIF_VALUE: 34
PIF_VALUE: 0
PIF_VALUE: 35
PIF_VALUE: 25
PIF_VALUE: 34
PIF_VALUE: 34
PIF_VALUE: 26
PIF_VALUE: 29
PIF_VALUE: 27
PIF_VALUE: 14
PIF_VALUE: 14
PIF_VALUE: 18
PIF_VALUE: 20
PIF_VALUE: 26
PIF_VALUE: 34
PIF_VALUE: 32
PIF_VALUE: 26
PIF_VALUE: 34
PIF_VALUE: 26
PIF_VALUE: 29
PIF_VALUE: 34
PIF_VALUE: 25
PIF_VALUE: 34
PIF_VALUE: 26
PIF_VALUE: 20
PIF_VALUE: 28
PIF_VALUE: 29
PIF_VALUE: 34
PIF_VALUE: 20
PIF_VALUE: 29
PIF_VALUE: 29
PIF_VALUE: 33
PIF_VALUE: 25
PIF_VALUE: 28
PIF_VALUE: 25
PIF_VALUE: 33
PIF_VALUE: 33
PIF_VALUE: 3
PIF_VALUE: 27
PIF_VALUE: 32
PIF_VALUE: 29
PIF_VALUE: 17
PIF_VALUE: 34
PIF_VALUE: 27
PIF_VALUE: 32
PIF_VALUE: 17
PIF_VALUE: 33
PIF_VALUE: 3
PIF_VALUE: 29
PIF_VALUE: 28
PIF_VALUE: 26
PIF_VALUE: 34
PIF_VALUE: 30
PIF_VALUE: 32
PIF_VALUE: 25
PIF_VALUE: 27
PIF_VALUE: 20
PIF_VALUE: 26
PIF_VALUE: 27
PIF_VALUE: 34
PIF_VALUE: 26
PIF_VALUE: 24
PIF_VALUE: 18
PIF_VALUE: 27
PIF_VALUE: 25
PIF_VALUE: 5
PIF_VALUE: 34
PIF_VALUE: 19
PIF_VALUE: 34
PIF_VALUE: 27
PIF_VALUE: 14
PIF_VALUE: 29
PIF_VALUE: 32
PIF_VALUE: 2
PIF_VALUE: 27
PIF_VALUE: 34
PIF_VALUE: 33
PIF_VALUE: 28
PIF_VALUE: 20
PIF_VALUE: 32
PIF_VALUE: 26
PIF_VALUE: 27
PIF_VALUE: 25
PIF_VALUE: 19
PIF_VALUE: 26
PIF_VALUE: 29
PIF_VALUE: 34
PIF_VALUE: 20
PIF_VALUE: 33
PIF_VALUE: 27
PIF_VALUE: 25
PIF_VALUE: 33
PIF_VALUE: 26
PIF_VALUE: 27
PIF_VALUE: 27
PIF_VALUE: 14
PIF_VALUE: 28
PIF_VALUE: 32
PIF_VALUE: 26
PIF_VALUE: 19
PIF_VALUE: 26
PIF_VALUE: 27
PIF_VALUE: 29
PIF_VALUE: 34
PIF_VALUE: 26
PIF_VALUE: 24
PIF_VALUE: 34
PIF_VALUE: 26
PIF_VALUE: 25
PIF_VALUE: 34
PIF_VALUE: 13
PIF_VALUE: 29
PIF_VALUE: 25
PIF_VALUE: 27
PIF_VALUE: 32
PIF_VALUE: 34
PIF_VALUE: 28
PIF_VALUE: 26
PIF_VALUE: 26
PIF_VALUE: 4
PIF_VALUE: 20
PIF_VALUE: 33
PIF_VALUE: 17
PIF_VALUE: 25
PIF_VALUE: 32
PIF_VALUE: 27
PIF_VALUE: 18
PIF_VALUE: 33
PIF_VALUE: 4
PIF_VALUE: 19
PIF_VALUE: 35
PIF_VALUE: 34
PIF_VALUE: 33
PIF_VALUE: 26
PIF_VALUE: 35
PIF_VALUE: 25
PIF_VALUE: 29
PIF_VALUE: 29
PIF_VALUE: 34
PIF_VALUE: 27
PIF_VALUE: 20
PIF_VALUE: 28
PIF_VALUE: 27
PIF_VALUE: 27
PIF_VALUE: 19
PIF_VALUE: 34
PIF_VALUE: 34
PIF_VALUE: 29
PIF_VALUE: 18
PIF_VALUE: 29
PIF_VALUE: 34
PIF_VALUE: 24
PIF_VALUE: 32
PIF_VALUE: 27
PIF_VALUE: 26
PIF_VALUE: 28
PIF_VALUE: 33
PIF_VALUE: 33
PIF_VALUE: 34
PIF_VALUE: 0
PIF_VALUE: 34
PIF_VALUE: 4
PIF_VALUE: 29
PIF_VALUE: 33
PIF_VALUE: 29
PIF_VALUE: 27
PIF_VALUE: 28
PIF_VALUE: 18
PIF_VALUE: 22
PIF_VALUE: 33
PIF_VALUE: 4
PIF_VALUE: 26
PIF_VALUE: 5
PIF_VALUE: 25
PIF_VALUE: 29
PIF_VALUE: 29
PIF_VALUE: 27
PIF_VALUE: 27
PIF_VALUE: 34
PIF_VALUE: 33
PIF_VALUE: 26
PIF_VALUE: 34
PIF_VALUE: 26
PIF_VALUE: 19
PIF_VALUE: 28
PIF_VALUE: 26
PIF_VALUE: 33
PIF_VALUE: 26
PIF_VALUE: 26
PIF_VALUE: 34
PIF_VALUE: 27
PIF_VALUE: 26
PIF_VALUE: 25
PIF_VALUE: 25
PIF_VALUE: 18
PIF_VALUE: 33

## 2019-04-26 ASSESSMENT — PAIN DESCRIPTION - LOCATION
LOCATION: ABDOMEN;FLANK
LOCATION: ABDOMEN
LOCATION: ABDOMEN;FLANK

## 2019-04-26 ASSESSMENT — PAIN DESCRIPTION - DESCRIPTORS
DESCRIPTORS: ACHING
DESCRIPTORS: ACHING
DESCRIPTORS: CRAMPING;BURNING

## 2019-04-26 ASSESSMENT — PAIN - FUNCTIONAL ASSESSMENT
PAIN_FUNCTIONAL_ASSESSMENT: 0-10
PAIN_FUNCTIONAL_ASSESSMENT: PREVENTS OR INTERFERES SOME ACTIVE ACTIVITIES AND ADLS

## 2019-04-26 ASSESSMENT — PAIN DESCRIPTION - ONSET
ONSET: ON-GOING

## 2019-04-26 ASSESSMENT — PAIN SCALES - GENERAL
PAINLEVEL_OUTOF10: 7
PAINLEVEL_OUTOF10: 10
PAINLEVEL_OUTOF10: 6
PAINLEVEL_OUTOF10: 3
PAINLEVEL_OUTOF10: 10
PAINLEVEL_OUTOF10: 10

## 2019-04-26 ASSESSMENT — PAIN DESCRIPTION - PROGRESSION
CLINICAL_PROGRESSION: NOT CHANGED
CLINICAL_PROGRESSION: NOT CHANGED
CLINICAL_PROGRESSION: GRADUALLY WORSENING

## 2019-04-26 ASSESSMENT — PAIN DESCRIPTION - ORIENTATION
ORIENTATION: MID
ORIENTATION: RIGHT;LEFT;MID
ORIENTATION: RIGHT;LEFT;MID

## 2019-04-26 ASSESSMENT — PAIN DESCRIPTION - PAIN TYPE
TYPE: SURGICAL PAIN

## 2019-04-26 ASSESSMENT — PAIN DESCRIPTION - FREQUENCY
FREQUENCY: CONTINUOUS

## 2019-04-26 ASSESSMENT — LIFESTYLE VARIABLES: SMOKING_STATUS: 0

## 2019-04-26 NOTE — BRIEF OP NOTE
Brief Postoperative Note  ______________________________________________________________    Patient: Glenna Ahumada  YOB: 1955  MRN: [id number]  Date of Procedure: 4/26/2019    Pre-Op Diagnosis: RECTAL PROLAPSE    Post-Op Diagnosis: Same       Procedure(s):  OPEN RECTOPEXY, EXTENSIVE LYSIS OF ADHESIONS, INTRAOPERATIVE FLEXIBLE SIGMOIDOSCOPY  CYSTOSCOPY, BILATERAL URETERAL STENT INSERTION    Anesthesia: General    Surgeon(s):  MD Farnaz Starks MD Romualdo Curet, MD      Assistant: No qualified residents available. 2nd attending required given complexity of case. Estimated Blood Loss (mL): 985 cc    Complications: none    Specimens:   ID Type Source Tests Collected by Time Destination   A : OMENTUM Tissue Tissue SURGICAL PATHOLOGY Paul Izquierdo MD 4/26/2019 1200      Drains:   Urethral Catheter Latex 16 fr (Active)       Ureteral Catheter Left ureter 5 fr (Active)       Ureteral Catheter Right ureter 6 fr (Active)     Findings: see full note. Severe adhesions requiring open excision. Open rectopexy.      Farnaz Mckeon MD  Date: 4/26/2019  Time: 2:15 PM

## 2019-04-26 NOTE — PROGRESS NOTES
PACU Transfer Note    Vitals:    04/26/19 1600   BP: (!) 150/79   Pulse: 86   Resp: 23   Temp: 97.1 °F (36.2 °C)   SpO2: 98%       In: 172 [I.V.:172]  Out: 100 [Urine:100]    Pain assessment:  Cramping like pain in lower abdomen, IV ofirmev hung prior to discharge to floor. Pain Level: 0    Report given to Receiving unit RN. Kailyn Epperson RN    4/26/2019 4:09 PM

## 2019-04-26 NOTE — PROGRESS NOTES
Admission Progress Note  4/26/2019 4:36 PM     Data: Patient to room 5318 from PACU. See doc flow sheets for assessments and screenings. Action: Patient oriented to room and call light. Patient informed of plan of care. Reviewed the patient education folder with the patient and/or family. Patient instructed to call nurse with any needs or concerns. Response:  Patient verbalized understanding of plan of care and all instructions. Bed locked and in lowest position. Call light in reach. Will continue to monitor patient per plan of care.     Cherylene Coe, RN Electronically signed by Cherylene Coe, RN on 4/26/2019 at 4:37 PM    ________________________________________________________________________

## 2019-04-26 NOTE — PROGRESS NOTES
Department of Surgery:  Post-op Note    CC: rectal prolapse    Procedure(s) Performed: OPEN RECTOPEXY, EXTENSIVE LYSIS OF ADHESIONS, INTRAOPERATIVE FLEXIBLE SIGMOIDOSCOPY, CYSTOSCOPY, BILATERAL URETERAL STENT INSERTION    Subjective:   Patient's reports pelvic pain, semi controlled with meds. Describes it as \"burning\" sensation. Denies nausea or vomiting. NPO. Mandel in place, blood tinged \"cherry\" due to stents, has not been OOB. Objective:  Anesthesia type: General      I/O    Intra op    Post op     Fluids  2200 172      0     Urine 125 200     Exam:BP (!) 158/79   Pulse 80   Temp 96.6 °F (35.9 °C) (Axillary)   Resp 20   Ht 4' 11\" (1.499 m)   Wt 169 lb (76.7 kg)   SpO2 99%   BMI 34.13 kg/m²   Post-op vital signs:  Stable   Exam:General appearance: alert, appears stated age and cooperative  Lungs: normal effort, on 2L NC  Heart: regular rate and rhythm  Abdomen: soft, appropriately tender, obese, non distended, incisions C/D/Dressed, blood stain on midline dressing.   : mandel in place - blood tinged urine  Extremities: no edema or cyanosis    Assessment and Plan  Pt is a 75y.o. year old female w/ rectal prolapse s/p OPEN RECTOPEXY, EXTENSIVE LYSIS OF ADHESIONS, INTRAOPERATIVE FLEXIBLE SIGMOIDOSCOPY, CYSTOSCOPY, BILATERAL URETERAL STENT INSERTION POD #0    Pain management: Morphine pain panel PRN  Cardiovasc: hemodynamically stable, will continue to monitor  Respiratory:  IS ordered to bedside, encourage hourly IS and deep breathing, wean oxygen as tolerated  FeNa: Fluids  , Diet: NPO, sips with meds  :  Mandel to stay till tomorrow, Urine output is adequate will continue to monitor, blood tinged secondary to stent placement intraop  Ambulation: attempt OOB to chair once more awake , encourage ambulation  Prophylaxis: SCDs, lovenox tomorrow      Sridevi Solorio MD  General Surgery PGY 1  4/26/2019 6:53 PM  Pager [phone]

## 2019-04-26 NOTE — H&P
Vincent Bender    [id number]    [facility] Same Day Surgery Update H & P  Department of General Surgery   Surgical Service   Pre-operative History and Physical  Last H & P within the last 30 days. DIAGNOSIS:   RECTAL PROLAPSE    PROCEDURE:  WA LAPAROSCOPY, SURG, COLPOPEXY [02245] (SACRAL COLPOPEXY ROBOTIC)  CYSTOSCOPY INSERTION LIGHTED STENT     HISTORY OF PRESENT ILLNESS:    Patient with rectal prolapse after BMs presents today for the above procedure. Past Medical History:        Diagnosis Date    Anxiety, generalized     Diabetes mellitus ([address]     Diverticulitis     H/O ischemic bowel disease 03/2016    Hepatitis C     History of kidney stones     x 1 episode    Hyperlipidemia     LBBB (left bundle branch block)     Loose stools     secondary to history of colectomy    Paranoid schizophrenia ([address]     Proteinuria     history of    Rectal prolapse     Sciatica     Sleep apnea     USES CPAP     Past Surgical History:        Procedure Laterality Date    ABDOMEN SURGERY  09/20/2016    OPEN ILEOSTOMY TAKEDOWN                   COLONOSCOPY      DILATION AND CURETTAGE OF UTERUS      w/tubaligation    HYSTERECTOMY      PARTIAL VAGINAL    KIDNEY STONE SURGERY      KIDNEY STONE SURGERY      KNEE SURGERY      bilateral knee replacements    OTHER SURGICAL HISTORY  3/5/16    DIAGNOSTIC LAPAROSCOPY, SUBTOTAL COLECTOMY, COLOSTOMY,    WA COLONOSCOPY FLX DX W/COLLJ SPEC WHEN PFRMD N/A 10/25/2018    COLONOSCOPY, POSSIBLE POLYPECTOMY WITH MAC performed by Florentin Rodriguez MD at [facility] EXTRACTION       Past Social History:  Social History     Socioeconomic History    Marital status:       Spouse name: None    Number of children: None    Years of education: None    Highest education level: None   Occupational History    None   Social Needs    Financial resource strain: None    Food insecurity:     Worry: None     Inability: None    Transportation needs: Medical: None     Non-medical: None   Tobacco Use    Smoking status: Former Smoker     Packs/day: 0.00     Types: Cigarettes     Start date: 3/22/2001     Last attempt to quit: 2/22/2016     Years since quitting: 3.1    Smokeless tobacco: Never Used   Substance and Sexual Activity    Alcohol use: No    Drug use: No    Sexual activity: Not Currently   Lifestyle    Physical activity:     Days per week: None     Minutes per session: None    Stress: None   Relationships    Social connections:     Talks on phone: None     Gets together: None     Attends Protestant service: None     Active member of club or organization: None     Attends meetings of clubs or organizations: None     Relationship status: None    Intimate partner violence:     Fear of current or ex partner: None     Emotionally abused: None     Physically abused: None     Forced sexual activity: None   Other Topics Concern    None   Social History Narrative    None         Medications Prior to Admission:      Prior to Admission medications    Medication Sig Start Date End Date Taking? Authorizing Provider   losartan (COZAAR) 25 MG tablet Take 25 mg by mouth daily   Yes Historical Provider, MD   polyethylene glycol (GLYCOLAX) powder Take 17 g by mouth daily as needed (Dispense 8.3oz bottle for Bowel Prep day before surgery.) Take day before surgery for bowel prep 4/5/19 5/5/19 Yes Roberto Larson MD   docusate sodium (COLACE) 100 MG capsule Take 1 capsule by mouth daily as needed for Constipation (on day prior to surgery, take 2 tablets at noon.) Take 2 tablets at noon, day prior to surgery. 4/5/19  Yes Roberto Larson MD   metroNIDAZOLE (FLAGYL) 500 MG tablet Take one tablet by mouth 3 times on the day prior to surgery. Take one tablet at 1pm, 2pm and 9pm. 3/28/19  Yes Roberto Larson MD   neomycin (MYCIFRADIN) 500 MG tablet Take two tablet 3 times the day before surgery.  Take two tablet at 1pm, two tablet at 2pm and two tablet at 9pm. 3/28/19  Yes Naila He MD   DULoxetine (CYMBALTA) 20 MG extended release capsule Take 20 mg by mouth daily   Yes Historical Provider, MD   Multiple Vitamins-Minerals (MULTIVITAMIN PO) Take by mouth   Yes Historical Provider, MD   LORazepam (ATIVAN) 0.5 MG tablet Take 1 tablet by mouth every 12 hours as needed 3/31/16  Yes Cele Mahmood MD   lurasidone (LATUDA) 40 MG TABS tablet Take 1 tablet by mouth daily 3/31/16  Yes Cele Mahmood MD   famotidine (PEPCID) 20 MG tablet Take 1 tablet by mouth 2 times daily 3/22/16  Yes JEVON Granados - CNP   atorvastatin (LIPITOR) 40 MG tablet Take 40 mg by mouth daily   Yes Historical Provider, MD   glyBURIDE micronized (GLYNASE PRESTAB) 6 MG tablet Take 3 mg by mouth 2 times daily (before meals)    Yes Historical Provider, MD   zolpidem (AMBIEN) 10 MG tablet Take 10 mg by mouth nightly as needed. Yes Historical Provider, MD   sertraline (ZOLOFT) 50 MG tablet Take 50 mg by mouth daily. Yes Historical Provider, MD   lidocaine (XYLOCAINE) 5 % ointment Apply topically 2-3 times daily as needed. 9/25/18   Naila He MD   hydrocortisone (ANUSOL-HC) 25 MG suppository Place 1 suppository rectally daily as needed for Hemorrhoids 9/12/18   Naila He MD   glucose blood VI test strips (COOL BLOOD GLUCOSE TEST STRIPS) strip 1 each by In Vitro route daily As needed.     Historical Provider, MD   loperamide (RA ANTI-DIARRHEAL) 2 MG capsule Take 1 capsule by mouth 4 times daily as needed for Diarrhea 11/4/16   Jani Chung MD   ondansetron (ZOFRAN) 4 MG tablet Take 1 tablet by mouth every 8 hours as needed for Nausea or Vomiting 9/30/16   Jani Chung MD   naproxen (NAPROSYN) 500 MG tablet Take 500 mg by mouth 2 times daily (with meals)    Historical Provider, MD   acetaminophen 650 MG TABS Take 650 mg by mouth every 4 hours as needed 3/22/16   Nancy Sierra APRN - CNP   ipratropium-albuterol (DUONEB) 0.5-2.5 (3) MG/3ML SOLN nebulizer solution Inhale 3 mLs into the lungs every 4 hours as needed for Shortness of Breath 3/22/16   Christopher Abbott, APRN - CNP   albuterol sulfate  (90 BASE) MCG/ACT inhaler Inhale 2 puffs into the lungs every 6 hours as needed for Wheezing    Historical Provider, MD   diphenhydrAMINE (BENADRYL) 25 MG capsule Take 25-50 mg by mouth nightly as needed for Sleep     Historical Provider, MD         Allergies:  Patient has no known allergies. PHYSICAL EXAM:      BP (!) 184/75   Pulse 78   Temp 98.1 °F (36.7 °C) (Oral)   Resp 16   Ht 4' 11\" (1.499 m)   Wt 169 lb (76.7 kg)   SpO2 96%   BMI 34.13 kg/m²      Heart:  regular rate and rhythm, murmur    Lungs:  No increased work of breathing, good air exchange, clear to auscultation bilaterally, no crackles or wheezing    Abdomen:  soft, non-distended, non-tender, no rebound tenderness or guarding and no masses palpated    ASSESSMENT AND PLAN:    1. Patient seen and focused exam done today- no new changes since last physical exam on 4/2/19    2. Access to ancillary services are available per request of the provider.     JEVON Warren - CNP     4/26/2019

## 2019-04-26 NOTE — PROGRESS NOTES
RESPIRATORY THERAPY ASSESSMENT    Name:  Berry Cramer  Medical Record Number:  [id number]  Age: 75 y.o. Gender: female  : 1955  Today's Date:  2019  Room:  [facility]-    Assessment     Is the patient being admitted for a COPD or Asthma exacerbation? No   (If yes the patient will be seen every 4 hours for the first 24 hours and then reassessed)    Patient Admission Diagnosis      Allergies  No Known Allergies    Minimum Predicted Vital Capacity:     714          Actual Vital Capacity:      Unable to do, due to pain              Pulmonary History:TRINA  Home Oxygen Therapy:  room air  Home Respiratory Therapy:Albuterol and Albuterol/Ipratropium Bromide MDI   Current Respiratory Therapy:  Albuterol Q6 PRN, Duoneb Q4 PRN          Respiratory Severity Index(RSI)   Patients with orders for inhalation medications, oxygen, or any therapeutic treatment modality will be placed on Respiratory Protocol. They will be assessed with the first treatment and at least every 72 hours thereafter. The following severity scale will be used to determine frequency of treatment intervention.     Smoking History: Smoking History Less than 1ppd or less than 15 pack year = 1    Social History  Social History     Tobacco Use    Smoking status: Former Smoker     Packs/day: 0.00     Types: Cigarettes     Start date: 3/22/2001     Last attempt to quit: 2016     Years since quitting: 3.1    Smokeless tobacco: Never Used   Substance Use Topics    Alcohol use: No    Drug use: No       Recent Surgical History: Lower Abdominal = 2  Past Surgical History  Past Surgical History:   Procedure Laterality Date    ABDOMEN SURGERY  2016    OPEN ILEOSTOMY TAKEDOWN                   COLONOSCOPY      COLPOPEXY N/A 2019    OPEN RECTOPEXY, LYSIS OF ADHESIONS, INTRAOPERATIVE FLEXIBLE SIGMOIDOSCOPY performed by Heidi Ventura MD at [facility] 224 Bilateral 2019    CYSTOSCOPY, BILATERAL URETERAL STENT INSERTION performed by Elle Ricks MD at [address]      w/tubaligation    HYSTERECTOMY      PARTIAL VAGINAL    KIDNEY STONE SURGERY      KIDNEY STONE SURGERY      KNEE SURGERY      bilateral knee replacements    OTHER SURGICAL HISTORY  3/5/16    DIAGNOSTIC LAPAROSCOPY, SUBTOTAL COLECTOMY, COLOSTOMY,    ND COLONOSCOPY FLX DX W/COLLJ SPEC WHEN PFRMD N/A 10/25/2018    COLONOSCOPY, POSSIBLE POLYPECTOMY WITH MAC performed by Jimmy Franklin MD at 1 [facility] EXTRACTION         Level of Consciousness: Alert, Oriented, and Cooperative = 0    Level of Activity: Walking with assistance = 1    Respiratory Pattern: Regular Pattern; RR 8-20 = 0    Breath Sounds: Clear = 0    Sputum   ,  ,    Cough: Strong, spontaneous, non-productive = 0    Vital Signs   BP (!) 158/79   Pulse 80   Temp 96.6 °F (35.9 °C) (Axillary)   Resp 20   Ht 4' 11\" (1.499 m)   Wt 169 lb (76.7 kg)   SpO2 99%   BMI 34.13 kg/m²   SPO2 (COPD values may differ): 90-91% on room air or greater than 92% on FiO2 24- 28% = 1    Peak Flow (asthma only): not applicable = 0    RSI: 5-6 = Q4hr PRN (every four hours as needed) for dyspnea        Plan       Goals: medication delivery    Patient/caregiver was educated on the proper method of use for Respiratory Care Devices:  Yes      Level of patient/caregiver understanding able to:   ? Verbalize understanding   ? Demonstrate understanding       ? Teach back        ? Needs reinforcement       ? No available caregiver               ? Other:     Response to education:  Shiraz Church     Is patient being placed on Home Treatment Regimen? Yes     Does the patient have everything they need prior to discharge? NA     Comments: Pt in lots of pain at time of assessment, wears home CPAP--has unit with her.   Pt states she does not have nor has she ever used an inhaler or nebulizer at home     Plan of Care: Q4 PRN Duoneb, Q6 Albuterol per home regimen    Electronically signed by oDry Brown RCP on 4/26/2019 at 6:09 PM    Respiratory Protocol Guidelines     1. Assessment and treatment by Respiratory Therapy will be initiated for medication and therapeutic interventions upon initiation of aerosolized medication. 2. Physician will be contacted for respiratory rate (RR) greater than 35 breaths per minute. Therapy will be held for heart rate (HR) greater than 140 beats per minute, pending direction from physician. 3. Bronchodilators will be administered via Metered Dose Inhaler (MDI) with spacer when the following criteria are met:  a. Alert and cooperative     b. HR < 140 bpm  c. RR < 30 bpm                d. Can demonstrate a 2-3 second inspiratory hold  4. Bronchodilators will be administered via Hand Held Nebulizer [facility]) to patients when ANY of the following criteria are met  a. Incognizant or uncooperative          b. Patients treated with HHN at Home        c. Unable to demonstrate proper use of MDI with spacer     d. RR > 30 bpm   5. Bronchodilators will be delivered via Metered Dose Inhaler (MDI), HHN, Aerogen to intubated patients on mechanical ventilation. 6. Inhalation medication orders will be delivered and/or substituted as outlined below. Aerosolized Medications Ordering and Administration Guidelines:    1. All Medications will be ordered by a physician, and their frequency and/or modality will be adjusted as defined by the patients Respiratory Severity Index (RSI) score. 2. If the patient does not have documented COPD, consider discontinuing anticholinergics when RSI is less than 9.  3. If the bronchospasm worsens (increased RSI), then the bronchodilator frequency can be increased to a maximum of every 4 hours. If greater than every 4 hours is required, the physician will be contacted.   4. If the bronchospasm improves, the frequency of the bronchodilator can be decreased, based on the patient's RSI, but not less than home treatment regimen frequency. 5. Bronchodilator(s) will be discontinued if patient has a RSI less than 9 and has received no scheduled or as needed treatment for 72  Hrs. Patients Ordered on a Mucolytic Agent:    1. Must always be administered with a bronchodilator. 2. Discontinue if patient experiences worsened bronchospasm, or secretions have lessened to the point that the patient is able to clear them with a cough. Anti-inflammatory and Combination Medications:    1. If the patient lacks prior history of lung disease, is not using inhaled anti-inflammatory medication at home, and lacks wheezing by examination or by history for at least 24 hours, contact physician for possible discontinuation.

## 2019-04-26 NOTE — BRIEF OP NOTE
Urology Brief Op Note    Iveth Ugarte  YOB: 1955  [id number]    Pre-operative Diagnosis: Ureteral catheters requested for identification during pelvic surgery    Post-operative Diagnosis: Same    Procedure: cysto, place bilat ureteral catheters    Surgeon: Scott Montalvo    Assistants: none    Estimated Blood Loss: n/a    Complications: none        Scott Montalvo MD

## 2019-04-26 NOTE — OP NOTE
Operative Note  ______________________________________________________________    Patient: Thiago An  YOB: 1955  MRN: [id number]  Date of Procedure: 4/26/2019    Pre-Op Diagnosis: RECTAL PROLAPSE    Post-Op Diagnosis: Same       Procedure(s):  OPEN RECTOPEXY, EXTENSIVE LYSIS OF ADHESIONS, INTRAOPERATIVE FLEXIBLE SIGMOIDOSCOPY  CYSTOSCOPY, BILATERAL URETERAL STENT INSERTION    Anesthesia: General    Surgeon(s):  MD Shasha Guaman MD Princess Duncan, MD      Assistant: No qualified residents available. 2nd attending required given complexity of case. Estimated Blood Loss (mL): 487 cc    Complications: none    Specimens:   ID Type Source Tests Collected by Time Destination   A : OMENTUM Tissue Tissue SURGICAL PATHOLOGY Princess Chase MD 4/26/2019 1200      Drains:   Urethral Catheter Latex 16 fr (Active)       Ureteral Catheter Left ureter 5 fr (Active)       Ureteral Catheter Right ureter 6 fr (Active)     Findings: see full note. Severe adhesions requiring open excision. Open rectopexy. Indications: Rectal prolapse    OPERATIVE NOTE    After informed consent was obtained, the patient was taken to the operating room. General anesthesia was given. Patient received preop operative oral antibiotic and mechanical bowel prep. The patient was placed in the lithotomy position with appropriate padding. Damon catheter placed under sterile conditions along with stents by urology service. Please see their note. Rectal exam was performed and the rectum was irrigated. The patient was prepped and draped in the usual sterile fashion. Jeyson Meza was applied. We began by calling time out to confirm the key components of the operation. We then placed a laparoscopic port in the LUQ using [employer] and entered the abdomen without injury. We immediately noted severe adhesions throughout the abdomen specifically to the midline, the pelvis, and the former ostomy site.  We placed extra ports and began tediously dissecting small bowel loops away from the anterior abdominal wall. We spent over 4 hours lysing adhesions. We did as much as we could laparoscopically but there were severe dense adhesions in the pelvis with multiple small bowel loops stuck in the pelvis. It appeared this would not be a good case for the surgical robot. We did not ever dock the robot. After doing as much as we could laparoscopically it was clear we would need to open. We made a small lower midline incision and placed a hand port as a wound protector. We did as much as we could through this incision then had to make it slightly larger and place another larger wound protector that was sufficient for the rest of the case. Finally we could see well enough into the pelvis to mobilize the rest of the small bowel. We resected a small section of omentum during the course of our lysis of adhesions. We then ran the small bowel twice from the ileosigmoid anastomosis to the ligament of Treitz and saw no injuries or serosal tears. The small bowel appeared healthy without any ischemia. There was no twisting. Once everything was free we mobilized the rectum. We mobilized easily laterally and posteriorly but anteriorly everything was fused together. We decided at this point to perform the rectopexy rather than risk injuring the rectum. We secured the lateral stalks of the rectum to the sacrum using 2-0 Ethibond suture. We then performed flexible sigmoidoscopy. We inflated the rectum and filled the pelvis with saline to perform a bubble test to make sure there had no been any damage to the rectum. We held pressure on the distal small bowel. There was no bubbling and everything appeared healthy on anoscopy. The rectum and prior anastomosis were traversed easily. We closed the fascia with a looped 0 PDS suture. We then irrigated skin. Skin was closed with staples. All instrument counts were correct at the end of the case.  Exparel was given for post operative pain control. Dr. Michelle Mason was present and scrubbed throughout. 2nd attending Dr. Marie Hodges M.D. was required due to complexity and lack of qualified residents.      Janie Rosario M.D.  4/26/19   2:53 PM

## 2019-04-26 NOTE — PROGRESS NOTES
Attempted to instruct pt on IS. Pt in severe pain at this time and unable to perform.   IS left at bedside

## 2019-04-27 ENCOUNTER — ANESTHESIA (OUTPATIENT)
Dept: INPATIENT UNIT | Age: 64
DRG: 330 | End: 2019-04-27
Payer: COMMERCIAL

## 2019-04-27 ENCOUNTER — ANESTHESIA EVENT (OUTPATIENT)
Dept: INPATIENT UNIT | Age: 64
DRG: 330 | End: 2019-04-27
Payer: COMMERCIAL

## 2019-04-27 LAB
ALBUMIN SERPL-MCNC: 3.5 G/DL (ref 3.4–5)
ANION GAP SERPL CALCULATED.3IONS-SCNC: 10 MMOL/L (ref 3–16)
BASOPHILS ABSOLUTE: 0 K/UL (ref 0–0.2)
BASOPHILS RELATIVE PERCENT: 0 %
BUN BLDV-MCNC: 15 MG/DL (ref 7–20)
CALCIUM SERPL-MCNC: 8.9 MG/DL (ref 8.3–10.6)
CHLORIDE BLD-SCNC: 104 MMOL/L (ref 99–110)
CO2: 24 MMOL/L (ref 21–32)
CREAT SERPL-MCNC: 0.7 MG/DL (ref 0.6–1.2)
EOSINOPHILS ABSOLUTE: 0 K/UL (ref 0–0.6)
EOSINOPHILS RELATIVE PERCENT: 0 %
GFR AFRICAN AMERICAN: >60
GFR NON-AFRICAN AMERICAN: >60
GLUCOSE BLD-MCNC: 173 MG/DL (ref 70–99)
GLUCOSE BLD-MCNC: 183 MG/DL (ref 70–99)
GLUCOSE BLD-MCNC: 197 MG/DL (ref 70–99)
GLUCOSE BLD-MCNC: 209 MG/DL (ref 70–99)
GLUCOSE BLD-MCNC: 210 MG/DL (ref 70–99)
GLUCOSE BLD-MCNC: 222 MG/DL (ref 70–99)
HCT VFR BLD CALC: 34.9 % (ref 36–48)
HEMOGLOBIN: 11.7 G/DL (ref 12–16)
LYMPHOCYTES ABSOLUTE: 1.6 K/UL (ref 1–5.1)
LYMPHOCYTES RELATIVE PERCENT: 11.1 %
MAGNESIUM: 1.4 MG/DL (ref 1.8–2.4)
MCH RBC QN AUTO: 33.4 PG (ref 26–34)
MCHC RBC AUTO-ENTMCNC: 33.5 G/DL (ref 31–36)
MCV RBC AUTO: 99.6 FL (ref 80–100)
MONOCYTES ABSOLUTE: 1.5 K/UL (ref 0–1.3)
MONOCYTES RELATIVE PERCENT: 10.3 %
NEUTROPHILS ABSOLUTE: 11.5 K/UL (ref 1.7–7.7)
NEUTROPHILS RELATIVE PERCENT: 78.6 %
PDW BLD-RTO: 13.6 % (ref 12.4–15.4)
PERFORMED ON: ABNORMAL
PHOSPHORUS: 3.7 MG/DL (ref 2.5–4.9)
PLATELET # BLD: 212 K/UL (ref 135–450)
PMV BLD AUTO: 7.4 FL (ref 5–10.5)
POTASSIUM SERPL-SCNC: 4.5 MMOL/L (ref 3.5–5.1)
RBC # BLD: 3.51 M/UL (ref 4–5.2)
SODIUM BLD-SCNC: 138 MMOL/L (ref 136–145)
WBC # BLD: 14.6 K/UL (ref 4–11)

## 2019-04-27 PROCEDURE — 1200000000 HC SEMI PRIVATE

## 2019-04-27 PROCEDURE — 83735 ASSAY OF MAGNESIUM: CPT

## 2019-04-27 PROCEDURE — 85025 COMPLETE CBC W/AUTO DIFF WBC: CPT

## 2019-04-27 PROCEDURE — 2500000003 HC RX 250 WO HCPCS: Performed by: ANESTHESIOLOGY

## 2019-04-27 PROCEDURE — 45540 CORRECT RECTAL PROLAPSE: CPT | Performed by: SURGERY

## 2019-04-27 PROCEDURE — 94799 UNLISTED PULMONARY SVC/PX: CPT

## 2019-04-27 PROCEDURE — 0T788DZ DILATION OF BILATERAL URETERS WITH INTRALUMINAL DEVICE, VIA NATURAL OR ARTIFICIAL OPENING ENDOSCOPIC: ICD-10-PCS | Performed by: UROLOGY

## 2019-04-27 PROCEDURE — 0DQP0ZZ REPAIR RECTUM, OPEN APPROACH: ICD-10-PCS | Performed by: UROLOGY

## 2019-04-27 PROCEDURE — 6360000002 HC RX W HCPCS: Performed by: STUDENT IN AN ORGANIZED HEALTH CARE EDUCATION/TRAINING PROGRAM

## 2019-04-27 PROCEDURE — 6370000000 HC RX 637 (ALT 250 FOR IP): Performed by: STUDENT IN AN ORGANIZED HEALTH CARE EDUCATION/TRAINING PROGRAM

## 2019-04-27 PROCEDURE — 64488 TAP BLOCK BI INJECTION: CPT | Performed by: ANESTHESIOLOGY

## 2019-04-27 PROCEDURE — 2500000003 HC RX 250 WO HCPCS: Performed by: STUDENT IN AN ORGANIZED HEALTH CARE EDUCATION/TRAINING PROGRAM

## 2019-04-27 PROCEDURE — 51702 INSERT TEMP BLADDER CATH: CPT

## 2019-04-27 PROCEDURE — 94760 N-INVAS EAR/PLS OXIMETRY 1: CPT

## 2019-04-27 PROCEDURE — 8E0W0CZ ROBOTIC ASSISTED PROCEDURE OF TRUNK REGION, OPEN APPROACH: ICD-10-PCS | Performed by: UROLOGY

## 2019-04-27 PROCEDURE — 94664 DEMO&/EVAL PT USE INHALER: CPT

## 2019-04-27 PROCEDURE — 94150 VITAL CAPACITY TEST: CPT

## 2019-04-27 PROCEDURE — 80069 RENAL FUNCTION PANEL: CPT

## 2019-04-27 PROCEDURE — 36415 COLL VENOUS BLD VENIPUNCTURE: CPT

## 2019-04-27 PROCEDURE — 6360000002 HC RX W HCPCS: Performed by: ANESTHESIOLOGY

## 2019-04-27 PROCEDURE — 2580000003 HC RX 258: Performed by: STUDENT IN AN ORGANIZED HEALTH CARE EDUCATION/TRAINING PROGRAM

## 2019-04-27 PROCEDURE — C9290 INJ, BUPIVACAINE LIPOSOME: HCPCS | Performed by: ANESTHESIOLOGY

## 2019-04-27 PROCEDURE — 0DJD8ZZ INSPECTION OF LOWER INTESTINAL TRACT, VIA NATURAL OR ARTIFICIAL OPENING ENDOSCOPIC: ICD-10-PCS | Performed by: UROLOGY

## 2019-04-27 PROCEDURE — 0DN80ZZ RELEASE SMALL INTESTINE, OPEN APPROACH: ICD-10-PCS | Performed by: UROLOGY

## 2019-04-27 RX ORDER — BUPIVACAINE HYDROCHLORIDE AND EPINEPHRINE 5; 5 MG/ML; UG/ML
INJECTION, SOLUTION EPIDURAL; INTRACAUDAL; PERINEURAL PRN
Status: DISCONTINUED | OUTPATIENT
Start: 2019-04-27 | End: 2019-04-27 | Stop reason: HOSPADM

## 2019-04-27 RX ORDER — MAGNESIUM SULFATE IN WATER 40 MG/ML
4 INJECTION, SOLUTION INTRAVENOUS ONCE
Status: COMPLETED | OUTPATIENT
Start: 2019-04-27 | End: 2019-04-27

## 2019-04-27 RX ORDER — BUPIVACAINE HYDROCHLORIDE AND EPINEPHRINE 5; 5 MG/ML; UG/ML
INJECTION, SOLUTION EPIDURAL; INTRACAUDAL; PERINEURAL
Status: COMPLETED
Start: 2019-04-27 | End: 2019-04-27

## 2019-04-27 RX ORDER — METHOCARBAMOL 500 MG/1
1000 TABLET, FILM COATED ORAL 4 TIMES DAILY
Status: DISCONTINUED | OUTPATIENT
Start: 2019-04-27 | End: 2019-04-30

## 2019-04-27 RX ORDER — OXYCODONE HYDROCHLORIDE 5 MG/1
5 TABLET ORAL EVERY 4 HOURS PRN
Status: DISCONTINUED | OUTPATIENT
Start: 2019-04-27 | End: 2019-05-01

## 2019-04-27 RX ORDER — MORPHINE SULFATE 2 MG/ML
2 INJECTION, SOLUTION INTRAMUSCULAR; INTRAVENOUS
Status: DISCONTINUED | OUTPATIENT
Start: 2019-04-27 | End: 2019-05-01 | Stop reason: RX

## 2019-04-27 RX ORDER — LIDOCAINE HYDROCHLORIDE 20 MG/ML
INJECTION, SOLUTION INFILTRATION; PERINEURAL PRN
Status: DISCONTINUED | OUTPATIENT
Start: 2019-04-27 | End: 2019-04-27 | Stop reason: HOSPADM

## 2019-04-27 RX ORDER — DEXTROSE, SODIUM CHLORIDE, AND POTASSIUM CHLORIDE 5; .45; .15 G/100ML; G/100ML; G/100ML
INJECTION INTRAVENOUS CONTINUOUS
Status: DISCONTINUED | OUTPATIENT
Start: 2019-04-27 | End: 2019-04-27

## 2019-04-27 RX ORDER — ATORVASTATIN CALCIUM 40 MG/1
40 TABLET, FILM COATED ORAL DAILY
Status: DISCONTINUED | OUTPATIENT
Start: 2019-04-28 | End: 2019-04-30

## 2019-04-27 RX ORDER — POTASSIUM CHLORIDE AND SODIUM CHLORIDE 450; 150 MG/100ML; MG/100ML
INJECTION, SOLUTION INTRAVENOUS CONTINUOUS
Status: DISCONTINUED | OUTPATIENT
Start: 2019-04-27 | End: 2019-05-01

## 2019-04-27 RX ORDER — LIDOCAINE HYDROCHLORIDE 20 MG/ML
INJECTION, SOLUTION INFILTRATION; PERINEURAL
Status: COMPLETED
Start: 2019-04-27 | End: 2019-04-27

## 2019-04-27 RX ORDER — FAMOTIDINE 20 MG/1
20 TABLET, FILM COATED ORAL 2 TIMES DAILY
Status: DISCONTINUED | OUTPATIENT
Start: 2019-04-27 | End: 2019-04-30

## 2019-04-27 RX ORDER — MORPHINE SULFATE 4 MG/ML
4 INJECTION, SOLUTION INTRAMUSCULAR; INTRAVENOUS
Status: DISCONTINUED | OUTPATIENT
Start: 2019-04-27 | End: 2019-05-01 | Stop reason: RX

## 2019-04-27 RX ORDER — OXYCODONE HYDROCHLORIDE 5 MG/1
10 TABLET ORAL EVERY 4 HOURS PRN
Status: DISCONTINUED | OUTPATIENT
Start: 2019-04-27 | End: 2019-05-01

## 2019-04-27 RX ADMIN — OXYCODONE HYDROCHLORIDE 10 MG: 5 TABLET ORAL at 15:38

## 2019-04-27 RX ADMIN — LURASIDONE HYDROCHLORIDE 40 MG: 40 TABLET, FILM COATED ORAL at 09:55

## 2019-04-27 RX ADMIN — FAMOTIDINE 20 MG: 10 INJECTION, SOLUTION INTRAVENOUS at 09:55

## 2019-04-27 RX ADMIN — BUPIVACAINE HYDROCHLORIDE AND EPINEPHRINE BITARTRATE 30 ML: 5; .005 INJECTION, SOLUTION EPIDURAL; INTRACAUDAL; PERINEURAL at 10:44

## 2019-04-27 RX ADMIN — BUPIVACAINE 10 ML: 13.3 INJECTION, SUSPENSION, LIPOSOMAL INFILTRATION at 10:44

## 2019-04-27 RX ADMIN — POTASSIUM CHLORIDE AND SODIUM CHLORIDE: 450; 150 INJECTION, SOLUTION INTRAVENOUS at 18:30

## 2019-04-27 RX ADMIN — HYDRALAZINE HYDROCHLORIDE 5 MG: 20 INJECTION INTRAMUSCULAR; INTRAVENOUS at 00:33

## 2019-04-27 RX ADMIN — MORPHINE SULFATE 4 MG: 4 INJECTION, SOLUTION INTRAMUSCULAR; INTRAVENOUS at 12:35

## 2019-04-27 RX ADMIN — INSULIN LISPRO 2 UNITS: 100 INJECTION, SOLUTION INTRAVENOUS; SUBCUTANEOUS at 22:12

## 2019-04-27 RX ADMIN — INSULIN LISPRO 6 UNITS: 100 INJECTION, SOLUTION INTRAVENOUS; SUBCUTANEOUS at 17:44

## 2019-04-27 RX ADMIN — MORPHINE SULFATE 4 MG: 4 INJECTION, SOLUTION INTRAMUSCULAR; INTRAVENOUS at 21:28

## 2019-04-27 RX ADMIN — MORPHINE SULFATE 4 MG: 4 INJECTION, SOLUTION INTRAMUSCULAR; INTRAVENOUS at 23:30

## 2019-04-27 RX ADMIN — SERTRALINE HYDROCHLORIDE 50 MG: 50 TABLET ORAL at 09:55

## 2019-04-27 RX ADMIN — MORPHINE SULFATE 4 MG: 4 INJECTION, SOLUTION INTRAMUSCULAR; INTRAVENOUS at 00:33

## 2019-04-27 RX ADMIN — ACETAMINOPHEN 1000 MG: 10 INJECTION, SOLUTION INTRAVENOUS at 03:28

## 2019-04-27 RX ADMIN — ACETAMINOPHEN 1000 MG: 10 INJECTION, SOLUTION INTRAVENOUS at 15:34

## 2019-04-27 RX ADMIN — SODIUM CHLORIDE, SODIUM LACTATE, POTASSIUM CHLORIDE, AND CALCIUM CHLORIDE: 600; 310; 30; 20 INJECTION, SOLUTION INTRAVENOUS at 03:29

## 2019-04-27 RX ADMIN — INSULIN LISPRO 3 UNITS: 100 INJECTION, SOLUTION INTRAVENOUS; SUBCUTANEOUS at 06:08

## 2019-04-27 RX ADMIN — METHOCARBAMOL TABLETS 1000 MG: 500 TABLET, COATED ORAL at 22:12

## 2019-04-27 RX ADMIN — MORPHINE SULFATE 4 MG: 4 INJECTION, SOLUTION INTRAMUSCULAR; INTRAVENOUS at 03:39

## 2019-04-27 RX ADMIN — OXYCODONE HYDROCHLORIDE 10 MG: 5 TABLET ORAL at 20:10

## 2019-04-27 RX ADMIN — ENOXAPARIN SODIUM 40 MG: 40 INJECTION SUBCUTANEOUS at 09:56

## 2019-04-27 RX ADMIN — LIDOCAINE HYDROCHLORIDE 5 ML: 20 INJECTION, SOLUTION INFILTRATION; PERINEURAL at 10:44

## 2019-04-27 RX ADMIN — MORPHINE SULFATE 4 MG: 4 INJECTION, SOLUTION INTRAMUSCULAR; INTRAVENOUS at 08:51

## 2019-04-27 RX ADMIN — FAMOTIDINE 20 MG: 20 TABLET ORAL at 23:29

## 2019-04-27 RX ADMIN — ACETAMINOPHEN 1000 MG: 10 INJECTION, SOLUTION INTRAVENOUS at 08:51

## 2019-04-27 RX ADMIN — MORPHINE SULFATE 4 MG: 4 INJECTION, SOLUTION INTRAMUSCULAR; INTRAVENOUS at 06:14

## 2019-04-27 RX ADMIN — MORPHINE SULFATE 2 MG: 2 INJECTION, SOLUTION INTRAMUSCULAR; INTRAVENOUS at 17:44

## 2019-04-27 RX ADMIN — ACETAMINOPHEN 1000 MG: 10 INJECTION, SOLUTION INTRAVENOUS at 21:28

## 2019-04-27 RX ADMIN — POTASSIUM CHLORIDE AND SODIUM CHLORIDE: 450; 150 INJECTION, SOLUTION INTRAVENOUS at 09:54

## 2019-04-27 RX ADMIN — MAGNESIUM SULFATE HEPTAHYDRATE 4 G: 40 INJECTION, SOLUTION INTRAVENOUS at 09:55

## 2019-04-27 RX ADMIN — INSULIN LISPRO 6 UNITS: 100 INJECTION, SOLUTION INTRAVENOUS; SUBCUTANEOUS at 12:33

## 2019-04-27 RX ADMIN — DULOXETINE HYDROCHLORIDE 20 MG: 20 CAPSULE, DELAYED RELEASE ORAL at 09:56

## 2019-04-27 RX ADMIN — Medication 10 ML: at 12:36

## 2019-04-27 ASSESSMENT — PAIN DESCRIPTION - ORIENTATION
ORIENTATION: MID
ORIENTATION: RIGHT;LEFT;MID
ORIENTATION: RIGHT;LEFT;MID

## 2019-04-27 ASSESSMENT — PAIN DESCRIPTION - PROGRESSION
CLINICAL_PROGRESSION: NOT CHANGED

## 2019-04-27 ASSESSMENT — PAIN SCALES - GENERAL
PAINLEVEL_OUTOF10: 7
PAINLEVEL_OUTOF10: 8
PAINLEVEL_OUTOF10: 10
PAINLEVEL_OUTOF10: 7
PAINLEVEL_OUTOF10: 10
PAINLEVEL_OUTOF10: 9
PAINLEVEL_OUTOF10: 10
PAINLEVEL_OUTOF10: 7
PAINLEVEL_OUTOF10: 9
PAINLEVEL_OUTOF10: 10
PAINLEVEL_OUTOF10: 7
PAINLEVEL_OUTOF10: 7

## 2019-04-27 ASSESSMENT — PAIN DESCRIPTION - DESCRIPTORS
DESCRIPTORS: ACHING
DESCRIPTORS: ACHING
DESCRIPTORS: CRAMPING;CONSTANT

## 2019-04-27 ASSESSMENT — PAIN DESCRIPTION - LOCATION
LOCATION: ABDOMEN;FLANK
LOCATION: ABDOMEN
LOCATION: ABDOMEN;FLANK

## 2019-04-27 ASSESSMENT — PAIN DESCRIPTION - ONSET
ONSET: ON-GOING

## 2019-04-27 ASSESSMENT — PAIN DESCRIPTION - FREQUENCY
FREQUENCY: CONTINUOUS

## 2019-04-27 ASSESSMENT — PAIN DESCRIPTION - PAIN TYPE
TYPE: SURGICAL PAIN

## 2019-04-27 NOTE — PROGRESS NOTES
Pt is Oriented x4, Pt Alert but drowsy. complaints of pain, medication given, see mar. Pain not well controlled. Pt went to anesthesia to get tap block. Pt got 2 hours of relief before return of pain after block. Pt has voiced unreadiness to ambulate d/t pain. Has been turned every 2 hours. Pt surgical sites dressings have remained clean, dry, and intact. Urine output is still red. No Clots noted. Pt is congested cough but lungs still clear. Educated on splinting and IS use. Will continue to monitor.

## 2019-04-27 NOTE — PLAN OF CARE
Patient's parents present for initial family session. Focus of session was establishing rapport with parents as well as gathering information for psychosocial assessment. Current behavioral issues and treatment goals were explored. Writer also informed parents of the current insurance status (a denial of [facility] services). Writer explained that the appeal process has been initiated, however, parents were informed that patient would have to discharge, if the appeal process proves to be unsuccessful. Parents verbalized their understanding of this. Parents report that patient is the product of a normal pregnancy and that she was developmentally on track. Per mother, behavioral issues emerged when patient entered the formal education setting at age 2 and have become more severe as patient has gotten older. She has been enrolled in numerous schools and parents have had her evaluated for an IEP, but have been told she does not qualify. Behavioral issues in the classroom setting include: aggression towards peers and teachers, walking out of the classroom, refusing to follow directions, and distractibility. Patient engages in tantrums that consist of unsafe behavior such as throwing garbage cans and chairs, causing teachers to remove other students for their safety. Parents report that prior to starting day treatment, they received daily phone calls from school in regards to patient's negative behavior and that she was often sent home. At home, patient engages in tantrums that consist of crying and screaming on a daily basis. Parents report they last 20-30 minutes and that patient is set off by not getting her way or by being asked to do something she does not want to do. They note that she provokes siblings and that she wants to be the center of attention. Parents note that they currently reside together, but are not romantically involved. They are committed to amicable co-parenting and mother will be moving out and  Problem: Pain:  Goal: Pain level will decrease  Description  Pain level will decrease  Outcome: Ongoing  Note:   Pt given multiple pain interventions, Medications PRN, and scheduled. Hot or cold therapy offered. Tap block by Anesthesia. Repositioned, tv for distraction. Will continue to monitor. Problem: Risk for Impaired Skin Integrity  Goal: Tissue integrity - skin and mucous membranes  Description  Structural intactness and normal physiological function of skin and  mucous membranes. Outcome: Ongoing  Note:   Pt educated importance  of turning. Pt has been/will be turned every 2 hours. Will continue to assess skin and keep dry. Will continue to monitor. establishing her own residence within a few months. Parents report that they have lived separately in the past and that mother struggles to handle patient's behavior on her own. Patient will be splitting her time between her parents' respective households once mother moves out. Parents deny any AODA family history on either side and also deny any abuse (physical, verbal, emotional, or sexual) experienced by patient. They deny any trauma history connected to patient, however, mother admits to having her own anger issues and acknowledges that she has not modeled positive anger expression for patient. In terms of mental health family history, mother reports that she experiences anxiety, paranoia, has poor anger control and suspects that she may be bipolar. She reports being in therapy when she was younger, but is not clear on her diagnosis. She went on to say that her mother (patient's maternal grandmother) is diagnosed with bipolar. MD joined session and a considerable amount of session time spent in  with Dr. Brothers regarding family history, diagnosis, and medication possibilities. (See MD's note for full details). Patient joined session for about the last fifteen minutes. She had some difficulty identifying areas of her behavior that need work. She was noted to become somewhat defensive when her mother described her tantrums and defiance at home. She became tearful when the decision to have her start medication was discussed. Writer explained that this is a parental decision and something that patient will need to be cooperative with and accept. Plan is for writer to contact parents once the insurance appeal process is complete. Continue plan of care.

## 2019-04-27 NOTE — PROGRESS NOTES
Surgery Daily Progress Note  Patient: Nia Service    CC: rectal prolapse    Subjective :    Patient with incisional abdominal pain. Dilaudid has made her feel more nauseous. No fevers. Objective :    ROS: A 14 point review of systems was conducted, significant findings as noted in HPI. All other systems negative. Physical exam:    Vitals:    04/26/19 2336 04/27/19 0017 04/27/19 0400 04/27/19 0519   BP: (!) 164/81 (!) 174/87 (!) 152/64    Pulse: 87  97    Resp:   16 16   Temp:   98.3 °F (36.8 °C)    TempSrc:   Oral    SpO2:   95% 97%   Weight:       Height:           Infusions:   lactated ringers 125 mL/hr at 04/27/19 0329    dextrose         I/O:I/O last 3 completed shifts: In: 5954 [P.O.:30; I.V.:5924]  Out: 9537 [Urine:1370; Blood:100]           Wt Readings from Last 1 Encounters:   04/26/19 169 lb (76.7 kg)         Exam:General appearance: alert, appears stated age and cooperative  Lungs: normal effort, on 2L NC  Heart: regular rate and rhythm  Abdomen: soft, appropriately tender, obese, non distended, incisions C/D/Dressed, blood stain on midline dressing. : mandel in place - blood tinged urine  Extremities: no edema or cyanosis          LABS:   Recent Labs     04/26/19  0630 04/27/19  0610   WBC 6.0 14.6*   HGB 13.4 11.7*   HCT 39.8 34.9*   MCV 97.9 99.6    212        Recent Labs     04/26/19  0630 04/27/19  0610    138   K 4.8 4.5    104   CO2 22 24   PHOS  --  3.7   BUN 17 15   CREATININE 0.9 0.7        Recent Labs     04/26/19  0630   AST 20   ALT 20   BILITOT 0.4   ALKPHOS 55      No results for input(s): LIPASE, AMYLASE in the last 72 hours. Recent Labs     04/26/19  0630   PROT 8.1   INR 1.14      No results for input(s): CKTOTAL, CKMB, CKMBINDEX, TROPONINI in the last 72 hours.       ASSESSMENT/PLAN: Pt is a 75y.o. year old female w/ rectal prolapse s/p OPEN RECTOPEXY, EXTENSIVE LYSIS OF ADHESIONS, INTRAOPERATIVE FLEXIBLE SIGMOIDOSCOPY, CYSTOSCOPY, BILATERAL URETERAL STENT INSERTION POD #1    - will request TAP block from anesthesia  - keep mandel today  - mIVF  - home psych meds reordered  - PTOT  - Diet: dameon Kohler MD PGY-1  General Surgery Resident  04/27/19  7:05 AM  [phone]

## 2019-04-27 NOTE — PROGRESS NOTES
Pt alert and oriented, BP uncontrolled this shift otherwise VSS - PRNs given w benefit. Scheduled and PRNs given for pain - minimal benefit noted, pt refusing to take dilaudid as this has made her sick in the past, even after antiemetic offered ahead of administration. Damon w cherry colored urine, adequate output. NPO w ice chips. Has not been OOB due to pain control issues. Now resting in bed, alarm on, call light within reach.

## 2019-04-27 NOTE — ANESTHESIA PROCEDURE NOTES
TAP Block    Patient location during procedure: PACU  Staffing  Anesthesiologist: Lori Del Rio MD  Performed: anesthesiologist   Preanesthetic Checklist  Completed: patient identified, site marked, surgical consent, pre-op evaluation, timeout performed, IV checked, risks and benefits discussed, monitors and equipment checked, anesthesia consent given, oxygen available and patient being monitored  Peripheral Block  Patient position: supine  Prep: ChloraPrep  Patient monitoring: cardiac monitor, continuous pulse ox, frequent blood pressure checks and IV access  Block type: TAP  Laterality: bilateral  Injection technique: single-shot  Procedures: ultrasound guided  Local infiltration: lidocaine  Infiltration strength: 1 %  Dose: 3 mL  Provider prep: mask and sterile gloves  Local infiltration: lidocaine  Needle  Needle gauge: 21 G  Needle length: 10 cm  Needle localization: ultrasound guidance  Assessment  Injection assessment: negative aspiration for heme, no paresthesia on injection and local visualized surrounding nerve on ultrasound  Paresthesia pain: none  Slow fractionated injection: yes  Hemodynamics: stable  Additional Notes  Please refer to the nursing notes in the patient's chart for the block start and stop times, vital signs (complete list), and medications given during the block. The nerve block is being done for post-operative pain control, as requested by the surgeon. The procedure was discussed in detail with the the patient. Potential complications and side effects were discussed. These include bleeding, vascular puncture, intravascular injection, a reaction to medications given, seizures. If a brachial plexus block is being done, additional complications include a total spinal, hoarseness of voice, sympathetic block (Nate's syndrome), and shortness of breath related to phrenic nerve block.   All questions regarding the nerve block were answered prior to the procedure and the patient or patient representative was agreeable. Verbal consent for the nerve block was obtained from the patient. Verification of Nerve Block Site was done using the pre-op notes and the patient. The patient received the nerve block mentioned above. He/She remained alert and oriented throughout the entire block. The patient was able to communicate with nursing staff and physician. He/She was able to inform us of any pain on injection. Subcutaneous infiltration of 3 cc 1% lidocaine was administered using a 22 gauge needle at the site of the nerve block. Once the nerve was properly located, 10 ml exparel + 15 ml 0.5% bupivacaine with epi + 5 ml NS for each side (60 ml total, 20 ml of exparel) was slowly injected in each side of abdomen in 5 cc increments, with negative aspiration each time. Dosing and injection of the medication was done by way of the needle and was injected by the nursing staff. There was no pain on injection and the patient was comfortable. Additives: none  The nerve was identified in  short axis, in plane. Nerve swelling was visualized: No. The local anesthetic spread around nerve. There was circumferential spread of the local anesthetic around the nerve. An image was retained (with a copy in the patient's chart): Yes, see image below. Total Attempts for placement of nerve block: 1    No complications: Vital signs remained stable. All aspirations negative, no bleeding, no paresthesias, & needle placement was atraumatic    The vital signs remained stable throughout and after the procedure (see nursing notes). The patient remained comfortable during and after the block. There were no complications after the block was completed. Evaluation of the nerve block & pain control will be performed in the recovery room after the case has been completed. Nerve Block Needle:  Stimuplex Ultra 360 Insulated Echogenic Needle. 22 g x 80 mm. 30 degree bevel.     Please see the US image under media.       Reason for block: post-op pain management and at surgeon's request

## 2019-04-28 LAB
ALBUMIN SERPL-MCNC: 3.4 G/DL (ref 3.4–5)
ANION GAP SERPL CALCULATED.3IONS-SCNC: 10 MMOL/L (ref 3–16)
BASOPHILS ABSOLUTE: 0 K/UL (ref 0–0.2)
BASOPHILS RELATIVE PERCENT: 0.1 %
BUN BLDV-MCNC: 7 MG/DL (ref 7–20)
CALCIUM SERPL-MCNC: 8.8 MG/DL (ref 8.3–10.6)
CHLORIDE BLD-SCNC: 96 MMOL/L (ref 99–110)
CO2: 26 MMOL/L (ref 21–32)
CREAT SERPL-MCNC: <0.5 MG/DL (ref 0.6–1.2)
EOSINOPHILS ABSOLUTE: 0 K/UL (ref 0–0.6)
EOSINOPHILS RELATIVE PERCENT: 0 %
GFR AFRICAN AMERICAN: >60
GFR NON-AFRICAN AMERICAN: >60
GLUCOSE BLD-MCNC: 176 MG/DL (ref 70–99)
GLUCOSE BLD-MCNC: 177 MG/DL (ref 70–99)
GLUCOSE BLD-MCNC: 178 MG/DL (ref 70–99)
GLUCOSE BLD-MCNC: 179 MG/DL (ref 70–99)
GLUCOSE BLD-MCNC: 246 MG/DL (ref 70–99)
HCT VFR BLD CALC: 31.2 % (ref 36–48)
HEMOGLOBIN: 10.7 G/DL (ref 12–16)
LYMPHOCYTES ABSOLUTE: 1.3 K/UL (ref 1–5.1)
LYMPHOCYTES RELATIVE PERCENT: 11.9 %
MAGNESIUM: 1.7 MG/DL (ref 1.8–2.4)
MCH RBC QN AUTO: 33.7 PG (ref 26–34)
MCHC RBC AUTO-ENTMCNC: 34.3 G/DL (ref 31–36)
MCV RBC AUTO: 98.1 FL (ref 80–100)
MONOCYTES ABSOLUTE: 1.1 K/UL (ref 0–1.3)
MONOCYTES RELATIVE PERCENT: 10.5 %
NEUTROPHILS ABSOLUTE: 8.5 K/UL (ref 1.7–7.7)
NEUTROPHILS RELATIVE PERCENT: 77.5 %
PDW BLD-RTO: 13.1 % (ref 12.4–15.4)
PERFORMED ON: ABNORMAL
PHOSPHORUS: 2.1 MG/DL (ref 2.5–4.9)
PLATELET # BLD: 158 K/UL (ref 135–450)
PMV BLD AUTO: 7.4 FL (ref 5–10.5)
POTASSIUM SERPL-SCNC: 4 MMOL/L (ref 3.5–5.1)
RBC # BLD: 3.18 M/UL (ref 4–5.2)
SODIUM BLD-SCNC: 132 MMOL/L (ref 136–145)
WBC # BLD: 10.9 K/UL (ref 4–11)

## 2019-04-28 PROCEDURE — 80069 RENAL FUNCTION PANEL: CPT

## 2019-04-28 PROCEDURE — 6370000000 HC RX 637 (ALT 250 FOR IP): Performed by: STUDENT IN AN ORGANIZED HEALTH CARE EDUCATION/TRAINING PROGRAM

## 2019-04-28 PROCEDURE — 2580000003 HC RX 258: Performed by: STUDENT IN AN ORGANIZED HEALTH CARE EDUCATION/TRAINING PROGRAM

## 2019-04-28 PROCEDURE — 51702 INSERT TEMP BLADDER CATH: CPT

## 2019-04-28 PROCEDURE — 85025 COMPLETE CBC W/AUTO DIFF WBC: CPT

## 2019-04-28 PROCEDURE — 83735 ASSAY OF MAGNESIUM: CPT

## 2019-04-28 PROCEDURE — 94668 MNPJ CHEST WALL SBSQ: CPT

## 2019-04-28 PROCEDURE — 1200000000 HC SEMI PRIVATE

## 2019-04-28 PROCEDURE — 94760 N-INVAS EAR/PLS OXIMETRY 1: CPT

## 2019-04-28 PROCEDURE — 94660 CPAP INITIATION&MGMT: CPT

## 2019-04-28 PROCEDURE — 6360000002 HC RX W HCPCS: Performed by: STUDENT IN AN ORGANIZED HEALTH CARE EDUCATION/TRAINING PROGRAM

## 2019-04-28 PROCEDURE — 94667 MNPJ CHEST WALL 1ST: CPT

## 2019-04-28 PROCEDURE — 36415 COLL VENOUS BLD VENIPUNCTURE: CPT

## 2019-04-28 PROCEDURE — 94761 N-INVAS EAR/PLS OXIMETRY MLT: CPT

## 2019-04-28 RX ORDER — SODIUM CHLORIDE 1000 MG
1 TABLET, SOLUBLE MISCELLANEOUS
Status: DISPENSED | OUTPATIENT
Start: 2019-04-28 | End: 2019-04-29

## 2019-04-28 RX ORDER — MAGNESIUM SULFATE IN WATER 40 MG/ML
2 INJECTION, SOLUTION INTRAVENOUS ONCE
Status: COMPLETED | OUTPATIENT
Start: 2019-04-28 | End: 2019-04-28

## 2019-04-28 RX ADMIN — INSULIN LISPRO 2 UNITS: 100 INJECTION, SOLUTION INTRAVENOUS; SUBCUTANEOUS at 21:29

## 2019-04-28 RX ADMIN — POTASSIUM CHLORIDE AND SODIUM CHLORIDE: 450; 150 INJECTION, SOLUTION INTRAVENOUS at 16:21

## 2019-04-28 RX ADMIN — MORPHINE SULFATE 4 MG: 4 INJECTION, SOLUTION INTRAMUSCULAR; INTRAVENOUS at 05:32

## 2019-04-28 RX ADMIN — METHOCARBAMOL TABLETS 1000 MG: 500 TABLET, COATED ORAL at 17:17

## 2019-04-28 RX ADMIN — POTASSIUM CHLORIDE AND SODIUM CHLORIDE: 450; 150 INJECTION, SOLUTION INTRAVENOUS at 03:29

## 2019-04-28 RX ADMIN — ZOLPIDEM TARTRATE 10 MG: 5 TABLET ORAL at 21:13

## 2019-04-28 RX ADMIN — METHOCARBAMOL TABLETS 1000 MG: 500 TABLET, COATED ORAL at 14:13

## 2019-04-28 RX ADMIN — INSULIN LISPRO 3 UNITS: 100 INJECTION, SOLUTION INTRAVENOUS; SUBCUTANEOUS at 17:18

## 2019-04-28 RX ADMIN — MORPHINE SULFATE 4 MG: 4 INJECTION, SOLUTION INTRAMUSCULAR; INTRAVENOUS at 01:32

## 2019-04-28 RX ADMIN — HYDRALAZINE HYDROCHLORIDE 5 MG: 20 INJECTION INTRAMUSCULAR; INTRAVENOUS at 21:18

## 2019-04-28 RX ADMIN — LURASIDONE HYDROCHLORIDE 40 MG: 40 TABLET, FILM COATED ORAL at 08:46

## 2019-04-28 RX ADMIN — ENOXAPARIN SODIUM 40 MG: 40 INJECTION SUBCUTANEOUS at 08:44

## 2019-04-28 RX ADMIN — MORPHINE SULFATE 2 MG: 2 INJECTION, SOLUTION INTRAMUSCULAR; INTRAVENOUS at 16:10

## 2019-04-28 RX ADMIN — SODIUM CHLORIDE TAB 1 GM 1 G: 1 TAB at 12:03

## 2019-04-28 RX ADMIN — OXYCODONE HYDROCHLORIDE 10 MG: 5 TABLET ORAL at 17:17

## 2019-04-28 RX ADMIN — DIBASIC SODIUM PHOSPHATE, MONOBASIC POTASSIUM PHOSPHATE AND MONOBASIC SODIUM PHOSPHATE 2 TABLET: 852; 155; 130 TABLET ORAL at 06:55

## 2019-04-28 RX ADMIN — DULOXETINE HYDROCHLORIDE 20 MG: 20 CAPSULE, DELAYED RELEASE ORAL at 08:45

## 2019-04-28 RX ADMIN — HYDRALAZINE HYDROCHLORIDE 5 MG: 20 INJECTION INTRAMUSCULAR; INTRAVENOUS at 08:45

## 2019-04-28 RX ADMIN — INSULIN LISPRO 6 UNITS: 100 INJECTION, SOLUTION INTRAVENOUS; SUBCUTANEOUS at 12:03

## 2019-04-28 RX ADMIN — OXYCODONE HYDROCHLORIDE 10 MG: 5 TABLET ORAL at 21:17

## 2019-04-28 RX ADMIN — Medication 10 ML: at 21:29

## 2019-04-28 RX ADMIN — MORPHINE SULFATE 2 MG: 2 INJECTION, SOLUTION INTRAMUSCULAR; INTRAVENOUS at 08:45

## 2019-04-28 RX ADMIN — INSULIN LISPRO 3 UNITS: 100 INJECTION, SOLUTION INTRAVENOUS; SUBCUTANEOUS at 05:11

## 2019-04-28 RX ADMIN — ACETAMINOPHEN 1000 MG: 10 INJECTION, SOLUTION INTRAVENOUS at 03:29

## 2019-04-28 RX ADMIN — FAMOTIDINE 20 MG: 20 TABLET ORAL at 08:46

## 2019-04-28 RX ADMIN — SODIUM CHLORIDE TAB 1 GM 1 G: 1 TAB at 17:17

## 2019-04-28 RX ADMIN — FAMOTIDINE 20 MG: 20 TABLET ORAL at 21:09

## 2019-04-28 RX ADMIN — MORPHINE SULFATE 4 MG: 4 INJECTION, SOLUTION INTRAMUSCULAR; INTRAVENOUS at 03:32

## 2019-04-28 RX ADMIN — HYDRALAZINE HYDROCHLORIDE 5 MG: 20 INJECTION INTRAMUSCULAR; INTRAVENOUS at 00:03

## 2019-04-28 RX ADMIN — MORPHINE SULFATE 2 MG: 2 INJECTION, SOLUTION INTRAMUSCULAR; INTRAVENOUS at 12:00

## 2019-04-28 RX ADMIN — ONDANSETRON 4 MG: 2 INJECTION INTRAMUSCULAR; INTRAVENOUS at 12:31

## 2019-04-28 RX ADMIN — SERTRALINE HYDROCHLORIDE 50 MG: 50 TABLET ORAL at 08:46

## 2019-04-28 RX ADMIN — ACETAMINOPHEN 1000 MG: 10 INJECTION, SOLUTION INTRAVENOUS at 08:44

## 2019-04-28 RX ADMIN — MORPHINE SULFATE 2 MG: 2 INJECTION, SOLUTION INTRAMUSCULAR; INTRAVENOUS at 14:13

## 2019-04-28 RX ADMIN — METHOCARBAMOL TABLETS 1000 MG: 500 TABLET, COATED ORAL at 08:46

## 2019-04-28 RX ADMIN — Medication 10 ML: at 08:44

## 2019-04-28 RX ADMIN — Medication 10 ML: at 12:31

## 2019-04-28 RX ADMIN — ATORVASTATIN CALCIUM 40 MG: 40 TABLET, FILM COATED ORAL at 08:46

## 2019-04-28 RX ADMIN — METHOCARBAMOL TABLETS 1000 MG: 500 TABLET, COATED ORAL at 21:09

## 2019-04-28 RX ADMIN — MAGNESIUM SULFATE HEPTAHYDRATE 2 G: 40 INJECTION, SOLUTION INTRAVENOUS at 06:57

## 2019-04-28 ASSESSMENT — PAIN SCALES - GENERAL
PAINLEVEL_OUTOF10: 7
PAINLEVEL_OUTOF10: 10
PAINLEVEL_OUTOF10: 4
PAINLEVEL_OUTOF10: 8
PAINLEVEL_OUTOF10: 10
PAINLEVEL_OUTOF10: 9
PAINLEVEL_OUTOF10: 10
PAINLEVEL_OUTOF10: 7
PAINLEVEL_OUTOF10: 5

## 2019-04-28 ASSESSMENT — PAIN DESCRIPTION - ORIENTATION: ORIENTATION: MID

## 2019-04-28 ASSESSMENT — PAIN DESCRIPTION - PROGRESSION: CLINICAL_PROGRESSION: NOT CHANGED

## 2019-04-28 ASSESSMENT — PAIN - FUNCTIONAL ASSESSMENT: PAIN_FUNCTIONAL_ASSESSMENT: PREVENTS OR INTERFERES SOME ACTIVE ACTIVITIES AND ADLS

## 2019-04-28 ASSESSMENT — PAIN DESCRIPTION - LOCATION: LOCATION: ABDOMEN

## 2019-04-28 ASSESSMENT — PAIN DESCRIPTION - ONSET: ONSET: ON-GOING

## 2019-04-28 ASSESSMENT — PAIN DESCRIPTION - PAIN TYPE: TYPE: SURGICAL PAIN

## 2019-04-28 ASSESSMENT — PAIN DESCRIPTION - DESCRIPTORS: DESCRIPTORS: CRAMPING;CONSTANT

## 2019-04-28 ASSESSMENT — PAIN DESCRIPTION - FREQUENCY: FREQUENCY: CONTINUOUS

## 2019-04-28 NOTE — OP NOTE
[address]                [address]                                OPERATIVE REPORT    PATIENT NAME: Alex Pina                     :        1955  MED REC NO:   [id number]                          ROOM:       0376  ACCOUNT NO:   [de-identified]                           ADMIT DATE: 2019  PROVIDER:     Alissa Holland MD    DATE OF PROCEDURE:  2019    PREOPERATIVE DIAGNOSIS:  Bilateral ureteral catheters requested by  operating surgeon. POSTOPERATIVE DIAGNOSIS:  Bilateral ureteral catheters requested by  operating surgeon. PROCEDURE PERFORMED:  Cystoscopy and placement of bilateral ureteral  catheters. SURGEON:  Alissa Holland MD    ANESTHESIA:  General.    COMPLICATIONS:  None. INDICATIONS:  The patient is brought to the operating room by Dr. _____  rectal surgery. He has requested bilateral ureteral catheters for  ureteral localization intraoperatively. DESCRIPTION OF PROCEDURE:  The patient was brought to the operating  room, anesthesia induced. She was placed in lithotomy position. Genitalia prepped and draped. A 21-Palauan scope was inserted into the  bladder which appears normal.  The right ureteral orifice was easily  catheterized with a 6-Palauan Whistle tip catheter. Somewhat more  difficult to catheterize the left side. Accordingly, a guidewire was  obtained and inserted up the left ureter. A 5-Palauan _____ guidewire  without difficulty. Now the wire and the scope was removed. Both the  right and left ureteral catheters were brought to drain into the Damon  catheter which was placed.   This portion of the procedure was completed  and the remainder of the operative procedure is per the surgery team.        Heriberto Weiner MD    D: 2019 18:04:13       T: 2019 18:54:43     [email or web address]  Job#: [id number]     Doc#: [id number]    CC:

## 2019-04-28 NOTE — DISCHARGE SUMMARY
Discharge Summary      Patient:  Félix Chavis    Admit Date: 4/26/2019  5:15 AM  Discharge Date: 5/3/2019    Admitting Physician: Mary Jo Partida MD     Discharge Physician: Same    Admitting Diagnosis: Rectal Prolapse     Discharge Diagnosis: Same    Consults: Urology, Anesthesiology     HPI:   Félix Chavis is a 75year old female with long-standing history of rectal prolapse with bowel movements with associated bleeding. Prolapse required manual reduction. Hospital Course:  Patient underwent open rectopexy, extensive lysis of adhesions, intra-operative flex sig (Dr. Huston Bend Dr. Allyson Huff), cystoscopy with bilateral localizing ureteral stents (Dr. Trinity Blackburn). Patient recovered well in the PACU, though hematuria was noted, which is not unexpected given insertion and removal of bilateral localizing ureteral stents intra-operatively. Patient was transferred to the general med/surg floor with ROZINA in place where she experienced significant breakthrough pain. Patient, accordingly, underwent TAP block by anesthesiology and medication alteration with minimal improvement of incisional site pain. Patient with hyponatremia, which was monitored closely. The following morning, POD2, she continued to report significant though improving incisional site pain and was now able to ambulate to the chair. Damon remained for an additional day and patient remained on a clear liquid diet. Damon was removed on POD3, after which she was voiding independently and without difficulty. Patient was reporting marked decrease in nausea and return of appetite on POD4; therefore, her diet was advanced to full liquids. She developed nausea and vomiting and had NG tube placed. Abdominal XR showed dilated loops of small bowel consistent with ileus. On POD5, patient began to pass flatus and eventually had BMs. Patient had NG tube removed on POD6. Ileus appeared to resolve on POD7 as she had return of normal Bowel movements.  She was tolerating general diet and her pain was well controlled. She was later discharged with instructions to f/u in the outpatient setting.      Disposition: Home    Condition at discharge: Stable    Discharge Instructions: See separate form

## 2019-04-28 NOTE — PROGRESS NOTES
Surgery Daily Progress Note      CC: rectal prolapse    SUBJECTIVE:  No acute events overnight. Afebrile. States TAP block done yesterday provided only 2 hours of relief. Patient reports incisional pain is much more controlled since switching pain medication regimen to morphine. Endorses a persistent cough that exacerbates her pain. Denies fever, chills, nausea/vomiting, chest pain, or shortness of breath. Tolerating clear liquid diet well. Has not ambulated due to pain. No BM or flatus. ROS: A 14 point review of systems was conducted, significant findings as noted in HPI. All other systems negative. Objective :  Physical exam:    Vitals:    04/27/19 2007 04/27/19 2336 04/28/19 0300 04/28/19 0430   BP: (!) 165/99 (!) 163/88 (!) 167/84    Pulse: 92 89 87    Resp: 20 16 14 14   Temp: 98.1 °F (36.7 °C) 97.6 °F (36.4 °C) 98.1 °F (36.7 °C)    TempSrc: Oral Oral Oral    SpO2: 96% 94% 92% 92%   Weight:       Height:           General Appearance: Alert, no acute distress, grooming appropriate  Neuro: A&Ox3, no focal deficits, sensation intact  Chest/Lungs: CTAB, no crackles/rales, wheezes/rhonchi, normal effort, on RA  Cardiovascular: RRR, no murmurs/gallops/rubs  Abdomen: Soft, appropriately tender, non-distended, no guarding/rigidity. Incision sites c/d/i  : Damon catheter in place - blood tinged urine  Extremities: No edema, no cyanosis    Infusions:   0.45 % NaCl with KCl 20 mEq 100 mL/hr at 04/28/19 0329    dextrose         I/O:I/O last 3 completed shifts:   In: 1008 [P.O.:30; I.V.:978]  Out: 6487 [Urine:1475]           Wt Readings from Last 1 Encounters:   04/26/19 169 lb (76.7 kg)       LABS:   Recent Labs     04/27/19  0610 04/28/19  0553   WBC 14.6* 10.9   HGB 11.7* 10.7*   HCT 34.9* 31.2*   MCV 99.6 98.1    158        Recent Labs     04/27/19  0610 04/28/19  0553    132*   K 4.5 4.0    96*   CO2 24 26   PHOS 3.7 2.1*   BUN 15 7   CREATININE 0.7 <0.5*        Recent Labs 04/26/19  0630   AST 20   ALT 20   BILITOT 0.4   ALKPHOS 55      No results for input(s): LIPASE, AMYLASE in the last 72 hours. Recent Labs     04/26/19  0630   PROT 8.1   INR 1.14      No results for input(s): CKTOTAL, CKMB, CKMBINDEX, TROPONINI in the last 72 hours. ASSESSMENT/PLAN:   This is a 75 y.o. female  w/ rectal prolapse s/p OPEN RECTOPEXY, EXTENSIVE LYSIS OF ADHESIONS, INTRAOPERATIVE FLEXIBLE SIGMOIDOSCOPY, CYSTOSCOPY, BILATERAL URETERAL STENT INSERTION POD #2     - TAP block done by anesthesia - minimal pain relief  - Keep mandel in place for deep pelvic dissection- will likely remove 4/29   - mIVF 50cc/hr  - hyponatremia - will monitor closely with daily labs  - Clear liquid diet  - PT/OT  - patient must be oob to chair today  - mateus lowry, and REAL Enamorado, MS3  04/28/19  6:23 AM       Patient seen and examined with Medical Student. I agree with the assessment and plan as above. Changes were made as necessary. Jose Lindsey MD PGY-1  General Surgery Resident  04/28/19  7:42 AM  [phone]        I am post-call and off-campus today.  If you have any questions or concerns regarding this patient's care today, please page:    Nita Montana MD PGY-1 [phone]

## 2019-04-28 NOTE — PLAN OF CARE
Problem: Falls - Risk of:  Goal: Will remain free from falls  Description  Will remain free from falls  Outcome: Ongoing  Note:   Pt has bed in low position, break on, all belongings, call light, and bed side table within reach. Pt educated on call light and encouraged to use. 3/4 side rails up. Non skid socks on. Door open. Will continue to monitor. Problem: Pain:  Goal: Pain level will decrease  Description  Pain level will decrease  Outcome: Ongoing  Note:   Pt getting PRN Pain medication as soon as available, repositioned, ice pack applied. Asked to ambulate but refused also refused p.o. Pain medication. Will continue to monitor.

## 2019-04-28 NOTE — PROGRESS NOTES
Urology Progress Note      BP (!) 165/99   Pulse 92   Temp 98.1 °F (36.7 °C) (Oral)   Resp 20   Ht 4' 11\" (1.499 m)   Wt 169 lb (76.7 kg)   SpO2 96%   BMI 34.13 kg/m²   Temp  Av.1 °F (36.7 °C)  Min: 97.8 °F (36.6 °C)  Max: 98.3 °F (36.8 °C)     PT MOANING W/ REPOSITIONING    NO STENTS REMAIN    URINE SLTLY BLOODY BUT TRANSPARENT    WILL SIGN OFF-PLZ CALL IF WE CAN HELP      Michael Stallworth MD

## 2019-04-28 NOTE — PROGRESS NOTES
Pt A/Ox4. Vs stable with exception to BP. Pt still running slightly high. Parameter not met for PRN BP medication. Pt has had complaints of pain and nausea. Pt is Alert but drowsy. Pt surgical sites remain approximated with staples, skin around wound is pink. Pt is also using cold therapy for pain relief. Pt had 1 episode of emesis. Nausea medication given. Has since tolerated clear liquid diet. Pt was up in chair today, tolerated for 1 to 2 hours. Will ambulate to door of room. Pt still no bowel movement. Urine still cherry color. Will continue to monitor.

## 2019-04-28 NOTE — PROGRESS NOTES
Physical Therapy  No treatment  Attempted to see patient for PT evaluation. Patient supine in bed upon arrival.  Patient currently refusing PT evaluation at this time reporting, \"I just need to sleep. \"  Patient reports getting up to bedside chair earlier today and states, \"I will walk tomorrow after I rest.\"  Education provided regarding importance of mobility but patient continued to decline evaluation. Will attempt at a later date/time. RN aware.     Zay MOORE [address]

## 2019-04-29 LAB
ALBUMIN SERPL-MCNC: 3.1 G/DL (ref 3.4–5)
ANION GAP SERPL CALCULATED.3IONS-SCNC: 10 MMOL/L (ref 3–16)
BASOPHILS ABSOLUTE: 0 K/UL (ref 0–0.2)
BASOPHILS RELATIVE PERCENT: 0.3 %
BUN BLDV-MCNC: 7 MG/DL (ref 7–20)
CALCIUM SERPL-MCNC: 8.9 MG/DL (ref 8.3–10.6)
CHLORIDE BLD-SCNC: 93 MMOL/L (ref 99–110)
CO2: 29 MMOL/L (ref 21–32)
CREAT SERPL-MCNC: <0.5 MG/DL (ref 0.6–1.2)
EOSINOPHILS ABSOLUTE: 0 K/UL (ref 0–0.6)
EOSINOPHILS RELATIVE PERCENT: 0 %
GFR AFRICAN AMERICAN: >60
GFR NON-AFRICAN AMERICAN: >60
GLUCOSE BLD-MCNC: 160 MG/DL (ref 70–99)
GLUCOSE BLD-MCNC: 163 MG/DL (ref 70–99)
GLUCOSE BLD-MCNC: 214 MG/DL (ref 70–99)
GLUCOSE BLD-MCNC: 236 MG/DL (ref 70–99)
GLUCOSE BLD-MCNC: 277 MG/DL (ref 70–99)
HCT VFR BLD CALC: 31.6 % (ref 36–48)
HEMOGLOBIN: 10.9 G/DL (ref 12–16)
LYMPHOCYTES ABSOLUTE: 1.5 K/UL (ref 1–5.1)
LYMPHOCYTES RELATIVE PERCENT: 15.1 %
MAGNESIUM: 1.7 MG/DL (ref 1.8–2.4)
MCH RBC QN AUTO: 33.7 PG (ref 26–34)
MCHC RBC AUTO-ENTMCNC: 34.4 G/DL (ref 31–36)
MCV RBC AUTO: 97.9 FL (ref 80–100)
MONOCYTES ABSOLUTE: 0.9 K/UL (ref 0–1.3)
MONOCYTES RELATIVE PERCENT: 9.2 %
NEUTROPHILS ABSOLUTE: 7.7 K/UL (ref 1.7–7.7)
NEUTROPHILS RELATIVE PERCENT: 75.4 %
PDW BLD-RTO: 12.8 % (ref 12.4–15.4)
PERFORMED ON: ABNORMAL
PHOSPHORUS: 2.4 MG/DL (ref 2.5–4.9)
PLATELET # BLD: 156 K/UL (ref 135–450)
PMV BLD AUTO: 7.4 FL (ref 5–10.5)
POTASSIUM SERPL-SCNC: 4 MMOL/L (ref 3.5–5.1)
RBC # BLD: 3.22 M/UL (ref 4–5.2)
SODIUM BLD-SCNC: 132 MMOL/L (ref 136–145)
WBC # BLD: 10.3 K/UL (ref 4–11)

## 2019-04-29 PROCEDURE — 97535 SELF CARE MNGMENT TRAINING: CPT

## 2019-04-29 PROCEDURE — 94761 N-INVAS EAR/PLS OXIMETRY MLT: CPT

## 2019-04-29 PROCEDURE — 83735 ASSAY OF MAGNESIUM: CPT

## 2019-04-29 PROCEDURE — 97530 THERAPEUTIC ACTIVITIES: CPT

## 2019-04-29 PROCEDURE — 6370000000 HC RX 637 (ALT 250 FOR IP): Performed by: SURGERY

## 2019-04-29 PROCEDURE — 6360000002 HC RX W HCPCS: Performed by: SURGERY

## 2019-04-29 PROCEDURE — 6360000002 HC RX W HCPCS: Performed by: STUDENT IN AN ORGANIZED HEALTH CARE EDUCATION/TRAINING PROGRAM

## 2019-04-29 PROCEDURE — 97166 OT EVAL MOD COMPLEX 45 MIN: CPT

## 2019-04-29 PROCEDURE — 2580000003 HC RX 258: Performed by: SURGERY

## 2019-04-29 PROCEDURE — 97116 GAIT TRAINING THERAPY: CPT

## 2019-04-29 PROCEDURE — 80069 RENAL FUNCTION PANEL: CPT

## 2019-04-29 PROCEDURE — 97161 PT EVAL LOW COMPLEX 20 MIN: CPT

## 2019-04-29 PROCEDURE — 6370000000 HC RX 637 (ALT 250 FOR IP): Performed by: STUDENT IN AN ORGANIZED HEALTH CARE EDUCATION/TRAINING PROGRAM

## 2019-04-29 PROCEDURE — 85025 COMPLETE CBC W/AUTO DIFF WBC: CPT

## 2019-04-29 PROCEDURE — 2580000003 HC RX 258: Performed by: STUDENT IN AN ORGANIZED HEALTH CARE EDUCATION/TRAINING PROGRAM

## 2019-04-29 PROCEDURE — 94660 CPAP INITIATION&MGMT: CPT

## 2019-04-29 PROCEDURE — 94668 MNPJ CHEST WALL SBSQ: CPT

## 2019-04-29 PROCEDURE — 36415 COLL VENOUS BLD VENIPUNCTURE: CPT

## 2019-04-29 PROCEDURE — 1200000000 HC SEMI PRIVATE

## 2019-04-29 PROCEDURE — 2500000003 HC RX 250 WO HCPCS: Performed by: SURGERY

## 2019-04-29 RX ORDER — GABAPENTIN 300 MG/1
600 CAPSULE ORAL ONCE
Status: COMPLETED | OUTPATIENT
Start: 2019-04-29 | End: 2019-04-29

## 2019-04-29 RX ORDER — GABAPENTIN 300 MG/1
300 CAPSULE ORAL 3 TIMES DAILY
Status: DISCONTINUED | OUTPATIENT
Start: 2019-04-29 | End: 2019-04-30

## 2019-04-29 RX ORDER — MAGNESIUM SULFATE IN WATER 40 MG/ML
2 INJECTION, SOLUTION INTRAVENOUS ONCE
Status: COMPLETED | OUTPATIENT
Start: 2019-04-29 | End: 2019-04-29

## 2019-04-29 RX ADMIN — GABAPENTIN 600 MG: 300 CAPSULE ORAL at 08:27

## 2019-04-29 RX ADMIN — INSULIN LISPRO 5 UNITS: 100 INJECTION, SOLUTION INTRAVENOUS; SUBCUTANEOUS at 21:01

## 2019-04-29 RX ADMIN — OXYCODONE HYDROCHLORIDE 10 MG: 5 TABLET ORAL at 04:32

## 2019-04-29 RX ADMIN — FAMOTIDINE 20 MG: 20 TABLET ORAL at 08:27

## 2019-04-29 RX ADMIN — GABAPENTIN 300 MG: 300 CAPSULE ORAL at 21:20

## 2019-04-29 RX ADMIN — INSULIN LISPRO 6 UNITS: 100 INJECTION, SOLUTION INTRAVENOUS; SUBCUTANEOUS at 10:48

## 2019-04-29 RX ADMIN — POTASSIUM CHLORIDE AND SODIUM CHLORIDE: 450; 150 INJECTION, SOLUTION INTRAVENOUS at 11:39

## 2019-04-29 RX ADMIN — METHOCARBAMOL TABLETS 1000 MG: 500 TABLET, COATED ORAL at 21:20

## 2019-04-29 RX ADMIN — GABAPENTIN 300 MG: 300 CAPSULE ORAL at 15:08

## 2019-04-29 RX ADMIN — ZOLPIDEM TARTRATE 10 MG: 5 TABLET ORAL at 21:20

## 2019-04-29 RX ADMIN — METHOCARBAMOL TABLETS 1000 MG: 500 TABLET, COATED ORAL at 09:52

## 2019-04-29 RX ADMIN — DULOXETINE HYDROCHLORIDE 20 MG: 20 CAPSULE, DELAYED RELEASE ORAL at 08:27

## 2019-04-29 RX ADMIN — OXYCODONE HYDROCHLORIDE 10 MG: 5 TABLET ORAL at 21:20

## 2019-04-29 RX ADMIN — MAGNESIUM SULFATE HEPTAHYDRATE 2 G: 40 INJECTION, SOLUTION INTRAVENOUS at 08:28

## 2019-04-29 RX ADMIN — INSULIN LISPRO 6 UNITS: 100 INJECTION, SOLUTION INTRAVENOUS; SUBCUTANEOUS at 16:48

## 2019-04-29 RX ADMIN — SODIUM PHOSPHATE, MONOBASIC, MONOHYDRATE 30 MMOL: 276; 142 INJECTION, SOLUTION INTRAVENOUS at 08:28

## 2019-04-29 RX ADMIN — OXYCODONE HYDROCHLORIDE 10 MG: 5 TABLET ORAL at 08:27

## 2019-04-29 RX ADMIN — ATORVASTATIN CALCIUM 40 MG: 40 TABLET, FILM COATED ORAL at 08:27

## 2019-04-29 RX ADMIN — ENOXAPARIN SODIUM 40 MG: 40 INJECTION SUBCUTANEOUS at 08:28

## 2019-04-29 RX ADMIN — METHOCARBAMOL TABLETS 1000 MG: 500 TABLET, COATED ORAL at 15:08

## 2019-04-29 RX ADMIN — Medication 10 ML: at 21:20

## 2019-04-29 RX ADMIN — SERTRALINE HYDROCHLORIDE 50 MG: 50 TABLET ORAL at 08:27

## 2019-04-29 RX ADMIN — INSULIN LISPRO 3 UNITS: 100 INJECTION, SOLUTION INTRAVENOUS; SUBCUTANEOUS at 04:24

## 2019-04-29 RX ADMIN — OXYCODONE HYDROCHLORIDE 10 MG: 5 TABLET ORAL at 16:44

## 2019-04-29 RX ADMIN — FAMOTIDINE 20 MG: 20 TABLET ORAL at 21:20

## 2019-04-29 RX ADMIN — LURASIDONE HYDROCHLORIDE 40 MG: 40 TABLET, FILM COATED ORAL at 09:52

## 2019-04-29 RX ADMIN — Medication 10 ML: at 09:54

## 2019-04-29 ASSESSMENT — PAIN DESCRIPTION - ORIENTATION: ORIENTATION: RIGHT;LEFT;MID

## 2019-04-29 ASSESSMENT — PAIN SCALES - GENERAL
PAINLEVEL_OUTOF10: 9
PAINLEVEL_OUTOF10: 10
PAINLEVEL_OUTOF10: 0
PAINLEVEL_OUTOF10: 5
PAINLEVEL_OUTOF10: 10
PAINLEVEL_OUTOF10: 0
PAINLEVEL_OUTOF10: 10

## 2019-04-29 ASSESSMENT — PAIN DESCRIPTION - FREQUENCY: FREQUENCY: INTERMITTENT

## 2019-04-29 ASSESSMENT — PAIN DESCRIPTION - PROGRESSION: CLINICAL_PROGRESSION: NOT CHANGED

## 2019-04-29 ASSESSMENT — PAIN DESCRIPTION - LOCATION: LOCATION: ABDOMEN

## 2019-04-29 ASSESSMENT — PAIN DESCRIPTION - PAIN TYPE: TYPE: SURGICAL PAIN

## 2019-04-29 ASSESSMENT — PAIN DESCRIPTION - ONSET: ONSET: ON-GOING

## 2019-04-29 ASSESSMENT — PAIN DESCRIPTION - DESCRIPTORS: DESCRIPTORS: ACHING

## 2019-04-29 NOTE — PROGRESS NOTES
w/collj spec when pfrmd (N/A, 10/25/2018); Colpopexy (N/A, 4/26/2019); and Cystoscopy (Bilateral, 4/26/2019). Restrictions  Position Activity Restriction  Other position/activity restrictions: up as tolerated, ambulate patient     Vision/Hearing  Vision: Impaired  Vision Exceptions: Wears glasses for distance  Hearing: Within functional limits       Subjective  General  Chart Reviewed: Yes  Additional Pertinent Hx: Patient is a 76 y/o female admitted 4/26 for OPEN RECTOPEXY, EXTENSIVE LYSIS OF ADHESIONS, INTRAOPERATIVE FLEXIBLE SIGMOIDOSCOPY CYSTOSCOPY, BILATERAL URETERAL STENT INSERTION. PMH significant for diabetes, diverticulitis, ischemic bowel disease, hepatitis C, HLD, LBBB, paranoid schizophrenia, rectal prolapse, hysterectomy, kidney stone surgery, B TKA, subtotal colectomy. Family / Caregiver Present: No  Referring Practitioner: Rodolfo Ruby MD  Diagnosis: rectal prolapse  Follows Commands: Within Functional Limits  Subjective  Subjective: Pt found supine in bed and agreeable to PT. \" I just sat up earlier this morning.  \"  Pain Screening  Patient Currently in Pain: Yes(rates abdominal pain 8/10 at rest, 4/10 with mobility; RN aware)         Orientation  Orientation  Overall Orientation Status: Within Functional Limits     Social/Functional History  Social/Functional History  Lives With: Family([facility])  Type of Home: Apartment(1st floor)  Home Layout: One level  Home Access: Level entry  Bathroom Shower/Tub: Tub/Shower unit  Bathroom Toilet: Standard(sink nearby)  Bathroom Equipment: (none)  Home Equipment: Cane  ADL Assistance: Independent  Homemaking Assistance: Needs assistance(has assist every 2 weeks)  Ambulation Assistance: Independent  Transfer Assistance: Independent  Active : Yes  Occupation: Retired  Additional Comments: grand-daughter does grocery shopping, gets transportation from [employer] for appointments         Objective          AROM RLE (degrees)  RLE AROM: WFL  AROM LLE (degrees)  LLE AROM : WFL     Strength RLE  Strength RLE: WFL  Strength LLE  Strength LLE: WFL        Bed mobility  Supine to Sit: Stand by assistance(HOB up and use of rail)  Sit to Supine: Stand by assistance  Scooting: Independent     Transfers  Sit to Stand: Stand by assistance(x3 trials)  Stand to sit: Stand by assistance(x3 trials)     Ambulation  Device: Rolling Walker  Assistance: Contact guard assistance(progressing to SBA)  Quality of Gait: forward posture with slow, steady gait; no LOB noted  Distance: 20 ft, 100 ft     Balance  Sitting - Static: Good  Sitting - Dynamic: Good  Standing - Static: Fair  Standing - Dynamic: [address]  Times per week: 2-5  Current Treatment Recommendations: Functional Mobility Training, Gait Training, Transfer Training, Endurance Training  Safety Devices  Type of devices: Nurse notified, Call light within reach, Left in bed, Bed alarm in place             AM-PAC Score  AM-PAC Inpatient Mobility Raw Score : 21  AM-PAC Inpatient T-Scale Score : 50.25  Mobility Inpatient CMS 0-100% Score: 28.97  Mobility Inpatient CMS G-Code Modifier : CJ          Goals  Short term goals  Time Frame for Short term goals: discharge  Short term goal 1: ambulate 400 ft with or without AD supervision  Short term goal 2: sit to/from stand supervision  Patient Goals   Patient goals : return home       Therapy Time   Individual Concurrent Group Co-treatment   Time In 1005         Time Out 1045         Minutes 40           Timed Code Treatment Minutes:30       Total Treatment Minutes:  40    This note will serve as a discharge summary if patient is discharged from hospital before next treatment session.        Juana Montenegro, PT

## 2019-04-29 NOTE — PROGRESS NOTES
Urology Attending Progress Note      Subjective: no  complaints     Vitals:  /74   Pulse 103   Temp 98.5 °F (36.9 °C) (Oral)   Resp 18   Ht 4' 11\" (1.499 m)   Wt 169 lb (76.7 kg)   SpO2 93%   BMI 34.13 kg/m²   Temp  Av.3 °F (36.8 °C)  Min: 98.1 °F (36.7 °C)  Max: 98.7 °F (37.1 °C)    Intake/Output Summary (Last 24 hours) at 2019 0946  Last data filed at 2019 0826  Gross per 24 hour   Intake 30 ml   Output 3750 ml   Net -3720 ml       Exam: abd soft and non-tender. Mandel out. No void yet     Labs:  WBC:    Lab Results   Component Value Date    WBC 10.3 2019     Hemoglobin/Hematocrit:    Lab Results   Component Value Date    HGB 10.9 2019    HCT 31.6 2019     BMP:    Lab Results   Component Value Date     2019    K 4.0 2019    CL 93 2019    CO2 29 2019    BUN 7 2019    LABALBU 3.1 2019    CREATININE <0.5 2019    CALCIUM 8.9 2019    GFRAA >60 2019    LABGLOM >60 2019     PT/INR:    Lab Results   Component Value Date    PROTIME 13.0 2019    INR 1.14 2019     PTT:    Lab Results   Component Value Date    APTT 28.8 10/23/2016   [APTT      Antibiotic Therapy:  None     Imaging: no new       Impression/Plan: o. female  w/ rectal prolapse s/p OPEN RECTOPEXY, EXTENSIVE LYSIS OF ADHESIONS, INTRAOPERATIVE FLEXIBLE SIGMOIDOSCOPY, CYSTOSCOPY, BILATERAL URETERAL STENT INSERTION POD4    -mandel removed this AM. No void yet  -labs WNL  -bladder scan after first void, or within 6 hours if has not voided. -okay to in and out cath for PVR >450cc and uncomfortable         JEVON Fermin - CNP     Pt voided. Doing well. Will sign off. Please call if we can be of further help.   Philip Carranza

## 2019-04-29 NOTE — PLAN OF CARE
Problem: Falls - Risk of:  Goal: Absence of physical injury  Description  Absence of physical injury  4/29/2019 6980 by Rosetta Doherty RN  Outcome: Met This Shift     Problem: Pain:  Description  Pain management should include both nonpharmacologic and pharmacologic interventions. Goal: Control of acute pain  Description  Control of acute pain  4/29/2019 3680 by Rosetta Doherty RN  Outcome: Met This Shift  Goal: Control of chronic pain  Description  Control of chronic pain  4/29/2019 0632 by Rosetta Doherty RN  Outcome: Met This Shift     Problem: Risk for Impaired Skin Integrity  Goal: Tissue integrity - skin and mucous membranes  Description  Structural intactness and normal physiological function of skin and  mucous membranes. 4/29/2019 7279 by Rosetta Doherty RN  Outcome: Met This Shift     Problem: Falls - Risk of:  Goal: Will remain free from falls  Description  Will remain free from falls  4/29/2019 1746 by Efren Loredo RN  Outcome: Ongoing  Note:   Pt is A&Ox4, is high fall risk, Pt educated and encouraged to use call light to notify and wait for assistance from staff before getting OOB, pt uses call light appropriately, has made no unsafe movements, no attempts to get OOB without assistance. Pt's bed alarm remained on throughout shift, bed in lowest position, 2/4 side rails up, call light and bedside table within reach. No falls so far this shift, will continue to monitor. 4/29/2019 6155 by Rosetta Doherty RN  Outcome: Met This Shift  Note:   Patient remains free from physical injury. Patient ambulates in henning returns to bed and tolerates fairly well. Fall precautions in place: Patient in bed lowest position,wheels locked. 2/4 side rails up Call light and beside table within reach. Will continue to monitor       Problem: Pain:  Description  Pain management should include both nonpharmacologic and pharmacologic interventions.   Goal: Pain level will decrease  Description  Pain level will decrease  4/29/2019 1746 by Joey Whaley RN  Outcome: Ongoing  Note:   Pt rates pain 10/10 in abdomen. Pt has prn oxycodone, scheduled gabapentin and robaxin. Pt has had minimal relief. Will continue to monitor. 4/29/2019 5892 by Josefa Ghosh RN  Outcome: Ongoing  Note:   Patient c/o pain rated as 10 out of 10 Oxycodone given per protocol Upon reassessment patient was asleep with RR >10. Will continue to monitor         Problem: Bowel Function - Altered:  Description  Consider the use of chewing gum postoperatively for the prophylaxis and treatment of ileus. Goal: Bowel elimination is within specified parameters  Description  Bowel elimination is within specified parameters  Outcome: Ongoing  Note:   Bowel sounds active, +flatus. Pt has not had bowel movement during this shift. Will continue to monitor. Problem: Fluid Volume - Imbalance:  Goal: Absence of imbalanced fluid volume signs and symptoms  Description  Absence of imbalanced fluid volume signs and symptoms  Outcome: Ongoing  Note:   IVF infusing at 50 ml/hr. Pt tolerating fluids. Pt has not had much of an appetite, but drinking fluids. Voiding adequately. Will continue to monitor. Problem: Infection - Surgical Site:  Goal: Will show no infection signs and symptoms  Description  Will show no infection signs and symptoms  Outcome: Ongoing  Note:   Surgical site clean, dry and intact. No redness or drainage noted. Pt has remained afebrile during this shift. Will continue to monitor. Goal: Demonstration of wound healing without infection will improve  Description  Demonstration of wound healing without infection will improve  Outcome: Ongoing     Problem: Venous Thromboembolism:  Goal: Will show no signs or symptoms of venous thromboembolism  Description  Will show no signs or symptoms of venous thromboembolism  Outcome: Ongoing  Note:   SCD pump in place. Lovenox administered as scheduled.  No signs of DVT noted. Will continue to monitor.    Goal: Absence of signs or symptoms of impaired coagulation  Description  Absence of signs or symptoms of impaired coagulation  Outcome: Ongoing

## 2019-04-29 NOTE — PROGRESS NOTES
Pt alert and oriented. VSS. Pt has not required any prn hydralazine for elevated BP. Bowel sounds active. +flatus. Pt has not had much of an appetite during this shift. Tolerating fluids. Voiding adequately. Pt c/o 10/10 abdominal pain. Pain has been controlled with prn oxycodone, scheduled robaxin and gabapentin, with minimal relief. Pt has ambulated in halls three times during this shift. Currently up to chair. Chair alarm on, call light in reach. Will continue to monitor.  Electronically signed by Mark Mariee RN on 4/29/2019 at 5:41 PM

## 2019-04-29 NOTE — PROGRESS NOTES
Patient alert and oriented. BP elevated Hydralazine given per protocol. Patient c/o pain Oxycodone given per protocol. . CDI surgical sites staples in place. Damon in place draining cherry red urine. Patient ambulates in henning returns to bed and tolerates fairly well.t. Patient in bed lowest position call light within reach. All needs are met at this time.  Will continue to monitor

## 2019-04-29 NOTE — PROGRESS NOTES
Colorectal Surgery   Daily Progress Note      CC: Rectal prolapse    SUBJECTIVE:  Patient continues to complain of pain, though it has mildly improved compared to the previous day. She reports no flatus or bowel movements at this time. Damon remains in place. Patient is tolerating a clear liquid diet well, reporting no post-prandial pain, nausea, or vomiting. ROS: A 14 point review of systems was conducted, significant findings as noted in HPI. All other systems negative. OBJECTIVE:  Physical exam:    Vitals:    04/28/19 2115 04/29/19 0000 04/29/19 0135 04/29/19 0432   BP: (!) 165/73 (!) 159/70  139/70   Pulse: 93 88  82   Resp: 16 18 18 16   Temp: 98.1 °F (36.7 °C) 98.3 °F (36.8 °C)  98.7 °F (37.1 °C)   TempSrc: Oral Oral  Oral   SpO2: 95% 97%  94%   Weight:       Height:         General Appearance: Alert, no acute distress, grooming appropriate  Neuro: A&Ox3, no focal deficits, sensation intact  Chest/Lungs: CTAB, no crackles/rales, wheezes/rhonchi, normal effort  Cardiovascular: RRR, no murmurs/gallops/rubs  Abdomen: Soft, appropriately tender, mildly-distended, no guarding/rigidity. Incision sites c/d/i  : Damon catheter in place - urine has cleared up overtime   Extremities: No edema, no cyanosis    Infusions:   0.45 % NaCl with KCl 20 mEq 50 mL/hr at 04/28/19 1621    dextrose         I/O:I/O last 3 completed shifts: In: 30 [I.V.:30]  Out: 3550 [Urine:3550]           Wt Readings from Last 1 Encounters:   04/26/19 169 lb (76.7 kg)       LABS:   Recent Labs     04/28/19  0553 04/29/19  0604   WBC 10.9 10.3   HGB 10.7* 10.9*   HCT 31.2* 31.6*   MCV 98.1 97.9    156        Recent Labs     04/28/19  0553 04/29/19  0605   * 132*   K 4.0 4.0   CL 96* 93*   CO2 26 29   PHOS 2.1* 2.4*   BUN 7 7   CREATININE <0.5* <0.5*        No results for input(s): AST, ALT, ALB, BILIDIR, BILITOT, ALKPHOS in the last 72 hours. No results for input(s): LIPASE, AMYLASE in the last 72 hours.      No results for input(s): PROT, INR, APTT in the last 72 hours. No results for input(s): CKTOTAL, CKMB, CKMBINDEX, TROPONINI in the last 72 hours. ASSESSMENT/PLAN:   This is a 75 y.o. female  w/ rectal prolapse s/p OPEN RECTOPEXY, EXTENSIVE LYSIS OF ADHESIONS, INTRAOPERATIVE FLEXIBLE SIGMOIDOSCOPY, CYSTOSCOPY, BILATERAL URETERAL STENT INSERTION POD4     - Will remove mandel today   - Continue mIVF 50cc/hr  - Hyponatremia - will monitor closely with daily labs  - Clear liquid diet  - Post-operative ileus     - Awaiting bowel function   - Gabapentin started today -- 600mg loading dose, followed by 300mg TID  - Continue having patient get out of bed to chair today with at least one hallway walk per RN shift   - Pul toilet: Geraldine Meza, and Tracey Seaman MD, MPH  PGY-1 General Surgery  04/29/19  6:52 AM  [phone]    I am post-call and off campus today.  If you have any questions or concerns regarding this patient's care today, please page: Brissa Moya, DO PGY-3 [phone] or Zahraa Vasquez, [facility]

## 2019-04-29 NOTE — PLAN OF CARE
Problem: Falls - Risk of:  Goal: Will remain free from falls  Description  Will remain free from falls  Outcome: Met This Shift  Note:   Patient remains free from physical injury. Patient ambulates in henning returns to bed and tolerates fairly well. Fall precautions in place: Patient in bed lowest position,wheels locked. 2/4 side rails up Call light and beside table within reach. Will continue to monitor       Problem: Pain:  Goal: Pain level will decrease  Description  Pain level will decrease  Outcome: Ongoing  Note:   Patient c/o pain rated as 10 out of 10 Oxycodone given per protocol Upon reassessment patient was asleep with RR >10.   Will continue to monitor

## 2019-04-29 NOTE — PROGRESS NOTES
significant for diabetes, diverticulitis, ischemic bowel disease, hepatitis C, HLD, LBBB, paranoid schizophrenia, rectal prolapse, hysterectomy, kidney stone surgery, B TKA, subtotal colectomy. Family / Caregiver Present: No    Diagnosis: Rectal prolapse    Subjective  Subjective: Pt found supine in bed upon entry; agreeable to OT. Pain Assessment: Denies     Social/Functional History  Social/Functional History  [facility]  Type of Home: Apartment(1st floor)  Home Layout: One level  Home Access: Level entry  Bathroom Shower/Tub: Tub/Shower unit  Bathroom Toilet: Standard(sink nearby)  Bathroom Equipment: (none)  Home Equipment: Cane  ADL Assistance: Independent  Homemaking Assistance: Needs assistance(has assist every 2 weeks)  Ambulation Assistance: Independent  Transfer Assistance: Independent  Active : Yes  Occupation: Retired  Additional Comments: grand-daughter does grocery shopping, gets transportation from [employer] for appointments       Objective     Treatment included functional transfer training, ADLs, and patient education.      Vision: Impaired  Vision Exceptions: Wears glasses for distance  Hearing: Within functional limits    Orientation  Overall Orientation Status: Within Functional Limits    Balance  Sitting Balance: Independent    Standing Balance  Activity: functional mobility in room/hallway and restroom, standing for ADLs, toilet and sit-stand transfers     Functional Mobility  Functional - Mobility Device: Rolling Walker  Activity: To/from bathroom(+ hallway)  Assist Level: Stand by assistance    Toilet Transfers  Toilet - Technique: Ambulating(with RW)  Equipment Used: Standard toilet  Toilet Transfer: Supervision    ADL  Grooming: Independent(standing at sink to wash hands, brush teeth, wash face, and comb hair)  LE Dressing: Minimal assistance(assist to thread brief, pt able to pull up in stance with SBA)  Toileting: Stand by assistance    Bed mobility  Supine to Sit: Stand by assistance(HOB up and use of rail )  Sit to Supine: Stand by assistance  Scooting: Independent    Transfers  Sit to stand: Stand by assistance  Stand to sit: Stand by assistance    Cognition  Cognition Comment: Overall pleasant, cooperative; flat affect noted at times. LUE AROM (degrees)  LUE AROM : WFL  RUE AROM (degrees)  RUE AROM : WFL         Plan   Plan  Times per week: d/c     AM-PAC Score        AM-PAC Inpatient Daily Activity Raw Score: 22  AM-PAC Inpatient ADL T-Scale Score : 47.1  ADL Inpatient CMS 0-100% Score: 25.8  ADL Inpatient CMS G-Code Modifier : CJ    Goals  Patient Goals   Patient goals :  To return home        Therapy Time   Individual Concurrent Group Co-treatment   Time In 1015         Time Out 1045         Minutes 30         Timed Code Treatment Minutes:   15    Total Treatment Minutes:  Oniel Infante OT

## 2019-04-29 NOTE — CARE COORDINATION
2019  [facility])  Clinical Case Management Department    Patient: Prosper Rojas  MRN: [id number] / : 1955  ACCT: [de-identified]          Admission Documentation  Attending Provider: Cheryl Vargas MD  Admit date/time: 2019  5:15 AM  Status: Inpatient [101]  Diagnosis: Rectal prolapse     Readmission within last 30 days:  no     Living Situation  Discharge Planning  Type of Residence: Private Residence  Living Arrangements: Alone  Support Systems: Family Members  Potential Assistance Needed: N/A  Type of Home Care Services: None  Patient expects to be discharged to[de-identified] home  Expected Discharge Date: 19    Service Assessment       Values / Beliefs  Do you have any ethnic, cultural, sacramental, or spiritual Taoism needs you would like us to be aware of while you are in the hospital?: No    Advance Directives (For Healthcare)  Pre-existing DNR Comfort Care/DNR Arrest/DNI Order: No  Healthcare Directive: No, patient does not have an advance directive for healthcare treatment  Information on Healthcare Directives Requested: No  Patient Requests Assistance: No  Advance Directives: Pt. not interested at this time                         [address] with Benson Sheth, granddaughter. Patient refused HHC and SNF. States she receives meals on wheels and cleaning services twice a month. Durable Medical Equipment   none    Home Health/Skilled Nursing  Services at Discharge: None  Home care at home? No     Therapy Consults  PT evaluation needed?: No  OT Evalulation Needed?: No  SLP evaluation needed?: No    Home Medical Care  From home alone. Granddaughter Benson Sheth will be home with patient while she recovers. Daughter will drive patient home. Patient states she does not want [facility] or SNF.     Pharmacy: [facility] in [address] [place] [address]. Medications:  No  Does patient want to participate in local refill/meds to beds program?: No    Goals of Care  Patient expects to be discharged to[de-identified] home  Patient plans for SNF:n/a         Mode of transport from hospital: Daughter to drive in private car     Factors facilitating achievement of predicted outcomes: Family support, Cooperative and Pleasant    Barriers to discharge: Decreased motivation and Limited insight into deficits     Omayra Herrera RN  [facility]  Case Management Department  Ph: [phone] Fax: [phone]

## 2019-04-30 ENCOUNTER — APPOINTMENT (OUTPATIENT)
Dept: GENERAL RADIOLOGY | Age: 64
DRG: 330 | End: 2019-04-30
Attending: SURGERY
Payer: COMMERCIAL

## 2019-04-30 LAB
ALBUMIN SERPL-MCNC: 3.1 G/DL (ref 3.4–5)
ANION GAP SERPL CALCULATED.3IONS-SCNC: 13 MMOL/L (ref 3–16)
BASOPHILS ABSOLUTE: 0 K/UL (ref 0–0.2)
BASOPHILS RELATIVE PERCENT: 0.3 %
BUN BLDV-MCNC: 9 MG/DL (ref 7–20)
CALCIUM SERPL-MCNC: 8.9 MG/DL (ref 8.3–10.6)
CHLORIDE BLD-SCNC: 95 MMOL/L (ref 99–110)
CO2: 29 MMOL/L (ref 21–32)
CREAT SERPL-MCNC: 0.6 MG/DL (ref 0.6–1.2)
EOSINOPHILS ABSOLUTE: 0 K/UL (ref 0–0.6)
EOSINOPHILS RELATIVE PERCENT: 0.2 %
GFR AFRICAN AMERICAN: >60
GFR NON-AFRICAN AMERICAN: >60
GLUCOSE BLD-MCNC: 103 MG/DL (ref 70–99)
GLUCOSE BLD-MCNC: 122 MG/DL (ref 70–99)
GLUCOSE BLD-MCNC: 163 MG/DL (ref 70–99)
GLUCOSE BLD-MCNC: 171 MG/DL (ref 70–99)
GLUCOSE BLD-MCNC: 192 MG/DL (ref 70–99)
GLUCOSE BLD-MCNC: 268 MG/DL (ref 70–99)
GLUCOSE BLD-MCNC: 342 MG/DL (ref 70–99)
HCT VFR BLD CALC: 31.7 % (ref 36–48)
HEMOGLOBIN: 10.6 G/DL (ref 12–16)
LYMPHOCYTES ABSOLUTE: 1.5 K/UL (ref 1–5.1)
LYMPHOCYTES RELATIVE PERCENT: 18 %
MAGNESIUM: 1.9 MG/DL (ref 1.8–2.4)
MCH RBC QN AUTO: 33.2 PG (ref 26–34)
MCHC RBC AUTO-ENTMCNC: 33.5 G/DL (ref 31–36)
MCV RBC AUTO: 98.9 FL (ref 80–100)
MONOCYTES ABSOLUTE: 1 K/UL (ref 0–1.3)
MONOCYTES RELATIVE PERCENT: 12.1 %
NEUTROPHILS ABSOLUTE: 5.8 K/UL (ref 1.7–7.7)
NEUTROPHILS RELATIVE PERCENT: 69.4 %
PDW BLD-RTO: 12.8 % (ref 12.4–15.4)
PERFORMED ON: ABNORMAL
PHOSPHORUS: 3.3 MG/DL (ref 2.5–4.9)
PLATELET # BLD: 198 K/UL (ref 135–450)
PMV BLD AUTO: 7.2 FL (ref 5–10.5)
POTASSIUM SERPL-SCNC: 3.2 MMOL/L (ref 3.5–5.1)
RBC # BLD: 3.21 M/UL (ref 4–5.2)
SODIUM BLD-SCNC: 137 MMOL/L (ref 136–145)
WBC # BLD: 8.4 K/UL (ref 4–11)

## 2019-04-30 PROCEDURE — C9113 INJ PANTOPRAZOLE SODIUM, VIA: HCPCS | Performed by: STUDENT IN AN ORGANIZED HEALTH CARE EDUCATION/TRAINING PROGRAM

## 2019-04-30 PROCEDURE — 2500000003 HC RX 250 WO HCPCS: Performed by: SURGERY

## 2019-04-30 PROCEDURE — 6370000000 HC RX 637 (ALT 250 FOR IP): Performed by: STUDENT IN AN ORGANIZED HEALTH CARE EDUCATION/TRAINING PROGRAM

## 2019-04-30 PROCEDURE — 85025 COMPLETE CBC W/AUTO DIFF WBC: CPT

## 2019-04-30 PROCEDURE — 36415 COLL VENOUS BLD VENIPUNCTURE: CPT

## 2019-04-30 PROCEDURE — 80069 RENAL FUNCTION PANEL: CPT

## 2019-04-30 PROCEDURE — 94664 DEMO&/EVAL PT USE INHALER: CPT

## 2019-04-30 PROCEDURE — 6370000000 HC RX 637 (ALT 250 FOR IP): Performed by: SURGERY

## 2019-04-30 PROCEDURE — 94761 N-INVAS EAR/PLS OXIMETRY MLT: CPT

## 2019-04-30 PROCEDURE — 2580000003 HC RX 258: Performed by: SURGERY

## 2019-04-30 PROCEDURE — 6360000002 HC RX W HCPCS: Performed by: STUDENT IN AN ORGANIZED HEALTH CARE EDUCATION/TRAINING PROGRAM

## 2019-04-30 PROCEDURE — 1200000000 HC SEMI PRIVATE

## 2019-04-30 PROCEDURE — 94640 AIRWAY INHALATION TREATMENT: CPT

## 2019-04-30 PROCEDURE — 94668 MNPJ CHEST WALL SBSQ: CPT

## 2019-04-30 PROCEDURE — 83735 ASSAY OF MAGNESIUM: CPT

## 2019-04-30 PROCEDURE — 74019 RADEX ABDOMEN 2 VIEWS: CPT

## 2019-04-30 PROCEDURE — 2580000003 HC RX 258: Performed by: STUDENT IN AN ORGANIZED HEALTH CARE EDUCATION/TRAINING PROGRAM

## 2019-04-30 PROCEDURE — 6360000002 HC RX W HCPCS: Performed by: SURGERY

## 2019-04-30 PROCEDURE — 94150 VITAL CAPACITY TEST: CPT

## 2019-04-30 PROCEDURE — 6370000000 HC RX 637 (ALT 250 FOR IP): Performed by: NURSE PRACTITIONER

## 2019-04-30 RX ORDER — ACETAMINOPHEN 500 MG
1000 TABLET ORAL EVERY 6 HOURS
Status: DISCONTINUED | OUTPATIENT
Start: 2019-04-30 | End: 2019-04-30

## 2019-04-30 RX ORDER — ACETAMINOPHEN 160 MG/5ML
1000 SOLUTION ORAL EVERY 6 HOURS
Status: DISCONTINUED | OUTPATIENT
Start: 2019-04-30 | End: 2019-04-30

## 2019-04-30 RX ORDER — POTASSIUM CHLORIDE 7.45 MG/ML
10 INJECTION INTRAVENOUS
Status: COMPLETED | OUTPATIENT
Start: 2019-04-30 | End: 2019-04-30

## 2019-04-30 RX ORDER — PANTOPRAZOLE SODIUM 40 MG/10ML
40 INJECTION, POWDER, LYOPHILIZED, FOR SOLUTION INTRAVENOUS DAILY
Status: DISCONTINUED | OUTPATIENT
Start: 2019-04-30 | End: 2019-05-03 | Stop reason: HOSPADM

## 2019-04-30 RX ORDER — MAGNESIUM SULFATE 1 G/100ML
1 INJECTION INTRAVENOUS ONCE
Status: COMPLETED | OUTPATIENT
Start: 2019-04-30 | End: 2019-04-30

## 2019-04-30 RX ORDER — ACETAMINOPHEN 10 MG/ML
1000 INJECTION, SOLUTION INTRAVENOUS EVERY 6 HOURS
Status: COMPLETED | OUTPATIENT
Start: 2019-04-30 | End: 2019-05-02

## 2019-04-30 RX ADMIN — OXYCODONE HYDROCHLORIDE 10 MG: 5 TABLET ORAL at 12:08

## 2019-04-30 RX ADMIN — POTASSIUM CHLORIDE 10 MEQ: 10 INJECTION, SOLUTION INTRAVENOUS at 09:28

## 2019-04-30 RX ADMIN — ACETAMINOPHEN 1000 MG: 160 SOLUTION ORAL at 17:10

## 2019-04-30 RX ADMIN — ONDANSETRON 4 MG: 2 INJECTION INTRAMUSCULAR; INTRAVENOUS at 14:17

## 2019-04-30 RX ADMIN — METHOCARBAMOL TABLETS 1000 MG: 500 TABLET, COATED ORAL at 13:20

## 2019-04-30 RX ADMIN — POTASSIUM CHLORIDE 10 MEQ: 10 INJECTION, SOLUTION INTRAVENOUS at 12:08

## 2019-04-30 RX ADMIN — PANTOPRAZOLE SODIUM 40 MG: 40 INJECTION, POWDER, FOR SOLUTION INTRAVENOUS at 22:03

## 2019-04-30 RX ADMIN — OXYCODONE HYDROCHLORIDE 10 MG: 5 TABLET ORAL at 03:03

## 2019-04-30 RX ADMIN — GABAPENTIN 300 MG: 300 CAPSULE ORAL at 08:11

## 2019-04-30 RX ADMIN — SERTRALINE HYDROCHLORIDE 50 MG: 50 TABLET ORAL at 08:11

## 2019-04-30 RX ADMIN — ATORVASTATIN CALCIUM 40 MG: 40 TABLET, FILM COATED ORAL at 08:11

## 2019-04-30 RX ADMIN — POTASSIUM CHLORIDE 10 MEQ: 10 INJECTION, SOLUTION INTRAVENOUS at 10:30

## 2019-04-30 RX ADMIN — ACETAMINOPHEN 1000 MG: 10 INJECTION, SOLUTION INTRAVENOUS at 22:03

## 2019-04-30 RX ADMIN — OXYCODONE HYDROCHLORIDE 10 MG: 5 TABLET ORAL at 07:21

## 2019-04-30 RX ADMIN — DULOXETINE HYDROCHLORIDE 20 MG: 20 CAPSULE, DELAYED RELEASE ORAL at 08:11

## 2019-04-30 RX ADMIN — POTASSIUM CHLORIDE 10 MEQ: 10 INJECTION, SOLUTION INTRAVENOUS at 18:37

## 2019-04-30 RX ADMIN — INSULIN LISPRO 9 UNITS: 100 INJECTION, SOLUTION INTRAVENOUS; SUBCUTANEOUS at 17:13

## 2019-04-30 RX ADMIN — ONDANSETRON 4 MG: 2 INJECTION INTRAMUSCULAR; INTRAVENOUS at 20:17

## 2019-04-30 RX ADMIN — INSULIN LISPRO 3 UNITS: 100 INJECTION, SOLUTION INTRAVENOUS; SUBCUTANEOUS at 05:05

## 2019-04-30 RX ADMIN — ACETAMINOPHEN 1000 MG: 160 SOLUTION ORAL at 10:50

## 2019-04-30 RX ADMIN — METHOCARBAMOL TABLETS 1000 MG: 500 TABLET, COATED ORAL at 08:23

## 2019-04-30 RX ADMIN — GABAPENTIN 300 MG: 300 CAPSULE ORAL at 14:19

## 2019-04-30 RX ADMIN — POTASSIUM PHOSPHATE, MONOBASIC AND POTASSIUM PHOSPHATE, DIBASIC 20 MMOL: 224; 236 INJECTION, SOLUTION, CONCENTRATE INTRAVENOUS at 21:00

## 2019-04-30 RX ADMIN — INSULIN LISPRO 3 UNITS: 100 INJECTION, SOLUTION INTRAVENOUS; SUBCUTANEOUS at 23:23

## 2019-04-30 RX ADMIN — POTASSIUM CHLORIDE 10 MEQ: 10 INJECTION, SOLUTION INTRAVENOUS at 14:14

## 2019-04-30 RX ADMIN — OXYCODONE HYDROCHLORIDE 10 MG: 5 TABLET ORAL at 18:44

## 2019-04-30 RX ADMIN — LURASIDONE HYDROCHLORIDE 40 MG: 40 TABLET, FILM COATED ORAL at 08:23

## 2019-04-30 RX ADMIN — ENOXAPARIN SODIUM 40 MG: 40 INJECTION SUBCUTANEOUS at 08:11

## 2019-04-30 RX ADMIN — METHOCARBAMOL 500 MG: 100 INJECTION, SOLUTION INTRAMUSCULAR; INTRAVENOUS at 23:27

## 2019-04-30 RX ADMIN — METHOCARBAMOL TABLETS 1000 MG: 500 TABLET, COATED ORAL at 17:11

## 2019-04-30 RX ADMIN — INSULIN LISPRO 12 UNITS: 100 INJECTION, SOLUTION INTRAVENOUS; SUBCUTANEOUS at 11:41

## 2019-04-30 RX ADMIN — POTASSIUM CHLORIDE 10 MEQ: 10 INJECTION, SOLUTION INTRAVENOUS at 16:34

## 2019-04-30 RX ADMIN — POTASSIUM CHLORIDE AND SODIUM CHLORIDE: 450; 150 INJECTION, SOLUTION INTRAVENOUS at 22:07

## 2019-04-30 RX ADMIN — MAGNESIUM SULFATE HEPTAHYDRATE 1 G: 1 INJECTION, SOLUTION INTRAVENOUS at 08:12

## 2019-04-30 RX ADMIN — FAMOTIDINE 20 MG: 20 TABLET ORAL at 08:11

## 2019-04-30 ASSESSMENT — PAIN DESCRIPTION - ORIENTATION
ORIENTATION: RIGHT;LEFT
ORIENTATION: RIGHT;LEFT;MID
ORIENTATION: RIGHT;LOWER
ORIENTATION: RIGHT;LOWER
ORIENTATION: LOWER

## 2019-04-30 ASSESSMENT — PAIN DESCRIPTION - PAIN TYPE
TYPE: ACUTE PAIN;SURGICAL PAIN
TYPE: ACUTE PAIN;SURGICAL PAIN
TYPE: SURGICAL PAIN
TYPE: ACUTE PAIN;SURGICAL PAIN
TYPE: ACUTE PAIN;SURGICAL PAIN

## 2019-04-30 ASSESSMENT — PAIN DESCRIPTION - ONSET
ONSET: ON-GOING
ONSET: GRADUAL
ONSET: ON-GOING

## 2019-04-30 ASSESSMENT — PAIN - FUNCTIONAL ASSESSMENT
PAIN_FUNCTIONAL_ASSESSMENT: ACTIVITIES ARE NOT PREVENTED

## 2019-04-30 ASSESSMENT — PAIN DESCRIPTION - FREQUENCY
FREQUENCY: CONTINUOUS
FREQUENCY: INTERMITTENT
FREQUENCY: INTERMITTENT
FREQUENCY: CONTINUOUS
FREQUENCY: CONTINUOUS

## 2019-04-30 ASSESSMENT — PAIN DESCRIPTION - PROGRESSION
CLINICAL_PROGRESSION: GRADUALLY WORSENING
CLINICAL_PROGRESSION: GRADUALLY WORSENING
CLINICAL_PROGRESSION: NOT CHANGED

## 2019-04-30 ASSESSMENT — PAIN DESCRIPTION - LOCATION
LOCATION: ABDOMEN

## 2019-04-30 ASSESSMENT — PAIN DESCRIPTION - DESCRIPTORS
DESCRIPTORS: ACHING;CONSTANT;BURNING
DESCRIPTORS: BURNING;CONSTANT
DESCRIPTORS: BURNING;DISCOMFORT
DESCRIPTORS: ACHING;BURNING;CONSTANT
DESCRIPTORS: ACHING

## 2019-04-30 ASSESSMENT — PAIN SCALES - GENERAL
PAINLEVEL_OUTOF10: 8
PAINLEVEL_OUTOF10: 8
PAINLEVEL_OUTOF10: 9
PAINLEVEL_OUTOF10: 8
PAINLEVEL_OUTOF10: 0
PAINLEVEL_OUTOF10: 8
PAINLEVEL_OUTOF10: 10

## 2019-04-30 NOTE — CARE COORDINATION
CM following. Pt awaiting GI function. On full liquid diet. Up ambulating halls with walker. Plans to return home with granddaughter. No needs.   Electronically signed by Jake Ingram RN on 4/30/2019 at 3:36 PM

## 2019-04-30 NOTE — PROGRESS NOTES
the last 72 hours. No results for input(s): LIPASE, AMYLASE in the last 72 hours. No results for input(s): PROT, INR, APTT in the last 72 hours. No results for input(s): CKTOTAL, CKMB, CKMBINDEX, TROPONINI in the last 72 hours. ASSESSMENT/PLAN:   This is a 75 y.o. female  w/ rectal prolapse s/p OPEN RECTOPEXY, EXTENSIVE LYSIS OF ADHESIONS, INTRAOPERATIVE FLEXIBLE SIGMOIDOSCOPY, CYSTOSCOPY, BILATERAL URETERAL STENT INSERTION POD4     - Damon removed - voiding independently  - Continue mIVF 50cc/hr  - Monitor electrolytes - am labs pending  - Advanced to full liquid diet -- will assess tolerance   - Post-operative ileus    - Awaiting bowel function - passing gas   - OOB into chair and ambulating [facility] toilet: Carlos Caraballo, and IS      Willam Jauregui, MS3  04/30/19  5:50 AM  [phone]        Addendum:  Patient seen and examined with Medical Student and Surgical Team.  Agree with assessment and plan with changes made accordingly.     Laila Martinez MD, MPH  PGY-1 General Surgery  04/30/19  6:39 AM  [phone]

## 2019-04-30 NOTE — PROGRESS NOTES
Patient alert and oriented times 4, VSS. Patient complains of pain and burning at surgical site, medicated with PRN Roxicodone and scheduled Robaxin, Tylenol and Neurontin, as well as an abdominal binder is in place, there have been no complaints of NV. Tolerating full liquid diet, call light is in place, calls out appropriately when in need. Chair alarm is on, non skid socks, Patient is up times 1 with a walker. Patient has ambulated in room and in halls.

## 2019-04-30 NOTE — PROGRESS NOTES
Pt alert and oriented, VSS. Pain well controlled w PO meds. Tolerating clear liquids. Has ambulated halls twice this shift, SBA w walker. States she is passing flatus. Now resting in bed, alarm on, call light within reach.

## 2019-04-30 NOTE — PLAN OF CARE
Problem: Falls - Risk of:  Goal: Will remain free from falls  Description  Will remain free from falls  Outcome: Ongoing  Note:   Patient calls out appropriately when in need, call light is in place, chair alarm on, non skid socks in place. Problem: Pain:  Description  Pain management should include both nonpharmacologic and pharmacologic interventions. Goal: Pain level will decrease  Description  Pain level will decrease  Outcome: Ongoing  Note:   Patient is now describing pain as a 8 out of 10, down from 10 out of 10 this morning. PRN Roxicodone and scheduled Robaxin, Tylenol and Neurontin have been given, (see MAR). Problem: Infection - Surgical Site:  Goal: Will show no infection signs and symptoms  Description  Will show no infection signs and symptoms  Outcome: Ongoing  Note:   Patient shows no S/S of infection, afebrile, surgical sites are CDI and HUMZA.

## 2019-05-01 LAB
ALBUMIN SERPL-MCNC: 3 G/DL (ref 3.4–5)
ANION GAP SERPL CALCULATED.3IONS-SCNC: 11 MMOL/L (ref 3–16)
BASOPHILS ABSOLUTE: 0 K/UL (ref 0–0.2)
BASOPHILS RELATIVE PERCENT: 0.2 %
BUN BLDV-MCNC: 7 MG/DL (ref 7–20)
CALCIUM SERPL-MCNC: 9.2 MG/DL (ref 8.3–10.6)
CHLORIDE BLD-SCNC: 96 MMOL/L (ref 99–110)
CO2: 31 MMOL/L (ref 21–32)
CREAT SERPL-MCNC: 0.7 MG/DL (ref 0.6–1.2)
EOSINOPHILS ABSOLUTE: 0.1 K/UL (ref 0–0.6)
EOSINOPHILS RELATIVE PERCENT: 1.2 %
GFR AFRICAN AMERICAN: >60
GFR NON-AFRICAN AMERICAN: >60
GLUCOSE BLD-MCNC: 123 MG/DL (ref 70–99)
GLUCOSE BLD-MCNC: 123 MG/DL (ref 70–99)
GLUCOSE BLD-MCNC: 151 MG/DL (ref 70–99)
GLUCOSE BLD-MCNC: 151 MG/DL (ref 70–99)
GLUCOSE BLD-MCNC: 167 MG/DL (ref 70–99)
HCT VFR BLD CALC: 31.1 % (ref 36–48)
HEMOGLOBIN: 10.5 G/DL (ref 12–16)
LYMPHOCYTES ABSOLUTE: 2.1 K/UL (ref 1–5.1)
LYMPHOCYTES RELATIVE PERCENT: 20.5 %
MAGNESIUM: 1.9 MG/DL (ref 1.8–2.4)
MCH RBC QN AUTO: 33.9 PG (ref 26–34)
MCHC RBC AUTO-ENTMCNC: 33.9 G/DL (ref 31–36)
MCV RBC AUTO: 100 FL (ref 80–100)
MONOCYTES ABSOLUTE: 1.3 K/UL (ref 0–1.3)
MONOCYTES RELATIVE PERCENT: 13 %
NEUTROPHILS ABSOLUTE: 6.5 K/UL (ref 1.7–7.7)
NEUTROPHILS RELATIVE PERCENT: 65.1 %
PDW BLD-RTO: 13.1 % (ref 12.4–15.4)
PERFORMED ON: ABNORMAL
PHOSPHORUS: 3.5 MG/DL (ref 2.5–4.9)
PLATELET # BLD: 208 K/UL (ref 135–450)
PMV BLD AUTO: 6.9 FL (ref 5–10.5)
POTASSIUM SERPL-SCNC: 4.4 MMOL/L (ref 3.5–5.1)
RBC # BLD: 3.11 M/UL (ref 4–5.2)
SODIUM BLD-SCNC: 138 MMOL/L (ref 136–145)
WBC # BLD: 10 K/UL (ref 4–11)

## 2019-05-01 PROCEDURE — 94760 N-INVAS EAR/PLS OXIMETRY 1: CPT

## 2019-05-01 PROCEDURE — C9113 INJ PANTOPRAZOLE SODIUM, VIA: HCPCS | Performed by: STUDENT IN AN ORGANIZED HEALTH CARE EDUCATION/TRAINING PROGRAM

## 2019-05-01 PROCEDURE — 94668 MNPJ CHEST WALL SBSQ: CPT

## 2019-05-01 PROCEDURE — 85025 COMPLETE CBC W/AUTO DIFF WBC: CPT

## 2019-05-01 PROCEDURE — 2580000003 HC RX 258: Performed by: STUDENT IN AN ORGANIZED HEALTH CARE EDUCATION/TRAINING PROGRAM

## 2019-05-01 PROCEDURE — 83735 ASSAY OF MAGNESIUM: CPT

## 2019-05-01 PROCEDURE — 80069 RENAL FUNCTION PANEL: CPT

## 2019-05-01 PROCEDURE — 6360000002 HC RX W HCPCS: Performed by: STUDENT IN AN ORGANIZED HEALTH CARE EDUCATION/TRAINING PROGRAM

## 2019-05-01 PROCEDURE — 36415 COLL VENOUS BLD VENIPUNCTURE: CPT

## 2019-05-01 PROCEDURE — 1200000000 HC SEMI PRIVATE

## 2019-05-01 RX ORDER — MORPHINE SULFATE 2 MG/ML
4 INJECTION, SOLUTION INTRAMUSCULAR; INTRAVENOUS ONCE
Status: COMPLETED | OUTPATIENT
Start: 2019-05-01 | End: 2019-05-01

## 2019-05-01 RX ORDER — MORPHINE SULFATE 2 MG/ML
4 INJECTION, SOLUTION INTRAMUSCULAR; INTRAVENOUS
Status: DISCONTINUED | OUTPATIENT
Start: 2019-05-01 | End: 2019-05-03

## 2019-05-01 RX ORDER — MORPHINE SULFATE 2 MG/ML
2 INJECTION, SOLUTION INTRAMUSCULAR; INTRAVENOUS
Status: DISCONTINUED | OUTPATIENT
Start: 2019-05-01 | End: 2019-05-03

## 2019-05-01 RX ORDER — KETOROLAC TROMETHAMINE 30 MG/ML
15 INJECTION, SOLUTION INTRAMUSCULAR; INTRAVENOUS EVERY 6 HOURS
Status: DISCONTINUED | OUTPATIENT
Start: 2019-05-01 | End: 2019-05-01

## 2019-05-01 RX ORDER — SODIUM CHLORIDE, SODIUM LACTATE, POTASSIUM CHLORIDE, CALCIUM CHLORIDE 600; 310; 30; 20 MG/100ML; MG/100ML; MG/100ML; MG/100ML
INJECTION, SOLUTION INTRAVENOUS CONTINUOUS
Status: DISCONTINUED | OUTPATIENT
Start: 2019-05-01 | End: 2019-05-02

## 2019-05-01 RX ADMIN — INSULIN LISPRO 3 UNITS: 100 INJECTION, SOLUTION INTRAVENOUS; SUBCUTANEOUS at 04:32

## 2019-05-01 RX ADMIN — MORPHINE SULFATE 2 MG: 2 INJECTION, SOLUTION INTRAMUSCULAR; INTRAVENOUS at 02:27

## 2019-05-01 RX ADMIN — PANTOPRAZOLE SODIUM 40 MG: 40 INJECTION, POWDER, FOR SOLUTION INTRAVENOUS at 08:25

## 2019-05-01 RX ADMIN — ACETAMINOPHEN 1000 MG: 10 INJECTION, SOLUTION INTRAVENOUS at 21:14

## 2019-05-01 RX ADMIN — SODIUM CHLORIDE, POTASSIUM CHLORIDE, SODIUM LACTATE AND CALCIUM CHLORIDE: 600; 310; 30; 20 INJECTION, SOLUTION INTRAVENOUS at 14:46

## 2019-05-01 RX ADMIN — MORPHINE SULFATE 4 MG: 2 INJECTION, SOLUTION INTRAMUSCULAR; INTRAVENOUS at 08:46

## 2019-05-01 RX ADMIN — SODIUM CHLORIDE, POTASSIUM CHLORIDE, SODIUM LACTATE AND CALCIUM CHLORIDE: 600; 310; 30; 20 INJECTION, SOLUTION INTRAVENOUS at 06:54

## 2019-05-01 RX ADMIN — ACETAMINOPHEN 1000 MG: 10 INJECTION, SOLUTION INTRAVENOUS at 15:38

## 2019-05-01 RX ADMIN — METHOCARBAMOL 500 MG: 100 INJECTION, SOLUTION INTRAMUSCULAR; INTRAVENOUS at 06:54

## 2019-05-01 RX ADMIN — MORPHINE SULFATE 4 MG: 2 INJECTION, SOLUTION INTRAMUSCULAR; INTRAVENOUS at 19:39

## 2019-05-01 RX ADMIN — ENOXAPARIN SODIUM 40 MG: 40 INJECTION SUBCUTANEOUS at 08:25

## 2019-05-01 RX ADMIN — KETOROLAC TROMETHAMINE 15 MG: 30 INJECTION, SOLUTION INTRAMUSCULAR at 06:01

## 2019-05-01 RX ADMIN — METHOCARBAMOL 500 MG: 100 INJECTION, SOLUTION INTRAMUSCULAR; INTRAVENOUS at 14:46

## 2019-05-01 RX ADMIN — Medication 10 ML: at 08:25

## 2019-05-01 RX ADMIN — ACETAMINOPHEN 1000 MG: 10 INJECTION, SOLUTION INTRAVENOUS at 04:32

## 2019-05-01 RX ADMIN — SODIUM CHLORIDE, POTASSIUM CHLORIDE, SODIUM LACTATE AND CALCIUM CHLORIDE: 600; 310; 30; 20 INJECTION, SOLUTION INTRAVENOUS at 23:28

## 2019-05-01 RX ADMIN — ACETAMINOPHEN 1000 MG: 10 INJECTION, SOLUTION INTRAVENOUS at 08:47

## 2019-05-01 RX ADMIN — METHOCARBAMOL 500 MG: 100 INJECTION, SOLUTION INTRAMUSCULAR; INTRAVENOUS at 23:27

## 2019-05-01 ASSESSMENT — PAIN SCALES - GENERAL
PAINLEVEL_OUTOF10: 8
PAINLEVEL_OUTOF10: 6
PAINLEVEL_OUTOF10: 7
PAINLEVEL_OUTOF10: 5
PAINLEVEL_OUTOF10: 4
PAINLEVEL_OUTOF10: 6
PAINLEVEL_OUTOF10: 4

## 2019-05-01 ASSESSMENT — PAIN DESCRIPTION - ORIENTATION: ORIENTATION: RIGHT;LOWER

## 2019-05-01 ASSESSMENT — PAIN DESCRIPTION - FREQUENCY: FREQUENCY: CONTINUOUS

## 2019-05-01 ASSESSMENT — PAIN DESCRIPTION - LOCATION: LOCATION: ABDOMEN

## 2019-05-01 ASSESSMENT — PAIN - FUNCTIONAL ASSESSMENT: PAIN_FUNCTIONAL_ASSESSMENT: ACTIVITIES ARE NOT PREVENTED

## 2019-05-01 ASSESSMENT — PAIN DESCRIPTION - DESCRIPTORS: DESCRIPTORS: ACHING;CONSTANT;BURNING

## 2019-05-01 ASSESSMENT — PAIN DESCRIPTION - PAIN TYPE: TYPE: ACUTE PAIN;SURGICAL PAIN

## 2019-05-01 ASSESSMENT — PAIN DESCRIPTION - ONSET: ONSET: ON-GOING

## 2019-05-01 ASSESSMENT — PAIN DESCRIPTION - PROGRESSION: CLINICAL_PROGRESSION: GRADUALLY WORSENING

## 2019-05-01 NOTE — PLAN OF CARE
Problem: Falls - Risk of:  Goal: Will remain free from falls  Description  Will remain free from falls  Outcome: Met This Shift  Note:   Patient up with stand by assist with equipment. Bed and chair alarms used. Bed locked and in low position. Safety maintained  Goal: Absence of physical injury  Description  Absence of physical injury  Outcome: Met This Shift     Problem: Pain:  Description  Pain management should include both nonpharmacologic and pharmacologic interventions. Goal: Pain level will decrease  Description  Pain level will decrease  Outcome: Met This Shift  Note:   Abdomen surgical pain continues. Morphine and intermittent infusions of robaxin and acetaminophen given with benefit. Patient satisfied with pain control. Will monitor. Goal: Control of acute pain  Description  Control of acute pain  Outcome: Met This Shift     Problem: Risk for Impaired Skin Integrity  Goal: Tissue integrity - skin and mucous membranes  Description  Structural intactness and normal physiological function of skin and  mucous membranes. Outcome: Met This Shift  Note:   Skin intact. Except for surgical scars. Patient turns on own in bed and in chair. Witness repositioning. Patient up in halls several times through shift. Will continue to monitor. Problem: Fluid Volume - Imbalance:  Goal: Absence of imbalanced fluid volume signs and symptoms  Description  Absence of imbalanced fluid volume signs and symptoms  Outcome: Met This Shift  Note:   Urine output > 30 ml/hr. Tongue moist.  Vitals stable. Will monitor. Problem: Infection - Surgical Site:  Goal: Will show no infection signs and symptoms  Description  Will show no infection signs and symptoms  Outcome: Met This Shift  Note:   Afebrile. Surgical wounds without redness, drainage or warmth.   No sign of infection  Goal: Demonstration of wound healing without infection will improve  Description  Demonstration of wound healing without infection will improve  Outcome: Met This Shift     Problem: Venous Thromboembolism:  Goal: Will show no signs or symptoms of venous thromboembolism  Description  Will show no signs or symptoms of venous thromboembolism  Outcome: Met This Shift  Note:   No pain, redness or swelling of extremities. Will monitor.     Goal: Absence of signs or symptoms of impaired coagulation  Description  Absence of signs or symptoms of impaired coagulation  Outcome: Met This Shift

## 2019-05-01 NOTE — CARE COORDINATION
Cm following DC plan, pt plans to return to go home with granddaughter with no needs. Pt now with NG and awaiting return Gi function.    Electronically signed by Lacey Rm RN on 5/1/2019 at 12:58 PM  [phone]

## 2019-05-01 NOTE — PROGRESS NOTES
Surgery Daily Progress Note      CC: Rectal prolapse    SUBJECTIVE:  No acute events overnight. Patient rested well through the night. Incision site pain improved. Patient reports pain well controlled with medication. Patient had episode of vomiting yesterday. NG tube placed to decompress. Denies fever, chills, chest pain, or shortness of breath. Ambulating without difficulty and voiding independently. No BM, but states she passed gas after NG tube placement. ROS: A 14 point review of systems was conducted, significant findings as noted in HPI. All other systems negative. Objective :  Physical exam:    Vitals:    04/30/19 2003 04/30/19 2200 04/30/19 2253 05/01/19 0311   BP: (!) 158/97  (!) 143/82 124/82   Pulse: 85  94 89   Resp: 16  16 16   Temp: 98.4 °F (36.9 °C)  98.1 °F (36.7 °C) 97.9 °F (36.6 °C)   TempSrc: Oral  Oral Oral   SpO2: 96% 95% 93% 95%   Weight:       Height:           General Appearance: Alert, no acute distress, grooming appropriate  Neuro: A&Ox3, no focal deficits, sensation intact  Chest/Lungs: CTAB, no crackles/rales, wheezes/rhonchi, normal effort  Cardiovascular: RRR, no murmurs/gallops/rubs  Abdomen: Soft, appropriately tender, mildly distended, no guarding/rigidity. Incision sites c/d/i  : No mandel catheter  Extremities: No edema, no cyanosis    Infusions:   0.45 % NaCl with KCl 20 mEq 100 mL/hr at 04/30/19 2207    dextrose         I/O:I/O last 3 completed shifts:   In: 2583 [P.O.:360; I.V.:1102]  Out: 4320 [Urine:2120; Emesis/NG AFRVED:2491]           Wt Readings from Last 1 Encounters:   04/26/19 169 lb (76.7 kg)       LABS:   Recent Labs     04/29/19  0604 04/30/19  0540   WBC 10.3 8.4   HGB 10.9* 10.6*   HCT 31.6* 31.7*   MCV 97.9 98.9    198        Recent Labs     04/29/19  0605 04/30/19  0540   * 137   K 4.0 3.2*   CL 93* 95*   CO2 29 29   PHOS 2.4* 3.3   BUN 7 9   CREATININE <0.5* 0.6      No results for input(s): AST, ALT, ALB, BILIDIR, BILITOT, ALKPHOS in the

## 2019-05-01 NOTE — PROGRESS NOTES
NG tube placed per order, at 65 right nare. 2000 mL returned immediately. Pt tolerated well. To continuous LWS.

## 2019-05-02 LAB
ALBUMIN SERPL-MCNC: 2.9 G/DL (ref 3.4–5)
ANION GAP SERPL CALCULATED.3IONS-SCNC: 10 MMOL/L (ref 3–16)
BASOPHILS ABSOLUTE: 0 K/UL (ref 0–0.2)
BASOPHILS RELATIVE PERCENT: 0.2 %
BUN BLDV-MCNC: 8 MG/DL (ref 7–20)
CALCIUM SERPL-MCNC: 8.9 MG/DL (ref 8.3–10.6)
CHLORIDE BLD-SCNC: 99 MMOL/L (ref 99–110)
CO2: 30 MMOL/L (ref 21–32)
CREAT SERPL-MCNC: 0.5 MG/DL (ref 0.6–1.2)
EOSINOPHILS ABSOLUTE: 0.2 K/UL (ref 0–0.6)
EOSINOPHILS RELATIVE PERCENT: 3.2 %
GFR AFRICAN AMERICAN: >60
GFR NON-AFRICAN AMERICAN: >60
GLUCOSE BLD-MCNC: 127 MG/DL (ref 70–99)
GLUCOSE BLD-MCNC: 143 MG/DL (ref 70–99)
GLUCOSE BLD-MCNC: 143 MG/DL (ref 70–99)
GLUCOSE BLD-MCNC: 147 MG/DL (ref 70–99)
GLUCOSE BLD-MCNC: 170 MG/DL (ref 70–99)
HCT VFR BLD CALC: 29.6 % (ref 36–48)
HEMOGLOBIN: 10.2 G/DL (ref 12–16)
LYMPHOCYTES ABSOLUTE: 1.6 K/UL (ref 1–5.1)
LYMPHOCYTES RELATIVE PERCENT: 20.8 %
MAGNESIUM: 1.6 MG/DL (ref 1.8–2.4)
MCH RBC QN AUTO: 33.9 PG (ref 26–34)
MCHC RBC AUTO-ENTMCNC: 34.5 G/DL (ref 31–36)
MCV RBC AUTO: 98.4 FL (ref 80–100)
MONOCYTES ABSOLUTE: 0.9 K/UL (ref 0–1.3)
MONOCYTES RELATIVE PERCENT: 12.7 %
NEUTROPHILS ABSOLUTE: 4.7 K/UL (ref 1.7–7.7)
NEUTROPHILS RELATIVE PERCENT: 63.1 %
PDW BLD-RTO: 12.8 % (ref 12.4–15.4)
PERFORMED ON: ABNORMAL
PHOSPHORUS: 3.6 MG/DL (ref 2.5–4.9)
PLATELET # BLD: 209 K/UL (ref 135–450)
PMV BLD AUTO: 7.2 FL (ref 5–10.5)
POTASSIUM SERPL-SCNC: 3.8 MMOL/L (ref 3.5–5.1)
RBC # BLD: 3.01 M/UL (ref 4–5.2)
SODIUM BLD-SCNC: 139 MMOL/L (ref 136–145)
WBC # BLD: 7.5 K/UL (ref 4–11)

## 2019-05-02 PROCEDURE — 94761 N-INVAS EAR/PLS OXIMETRY MLT: CPT

## 2019-05-02 PROCEDURE — 94668 MNPJ CHEST WALL SBSQ: CPT

## 2019-05-02 PROCEDURE — 83735 ASSAY OF MAGNESIUM: CPT

## 2019-05-02 PROCEDURE — 1200000000 HC SEMI PRIVATE

## 2019-05-02 PROCEDURE — 6360000002 HC RX W HCPCS: Performed by: STUDENT IN AN ORGANIZED HEALTH CARE EDUCATION/TRAINING PROGRAM

## 2019-05-02 PROCEDURE — 36415 COLL VENOUS BLD VENIPUNCTURE: CPT

## 2019-05-02 PROCEDURE — 2580000003 HC RX 258: Performed by: STUDENT IN AN ORGANIZED HEALTH CARE EDUCATION/TRAINING PROGRAM

## 2019-05-02 PROCEDURE — 6370000000 HC RX 637 (ALT 250 FOR IP): Performed by: STUDENT IN AN ORGANIZED HEALTH CARE EDUCATION/TRAINING PROGRAM

## 2019-05-02 PROCEDURE — C9113 INJ PANTOPRAZOLE SODIUM, VIA: HCPCS | Performed by: STUDENT IN AN ORGANIZED HEALTH CARE EDUCATION/TRAINING PROGRAM

## 2019-05-02 PROCEDURE — 94660 CPAP INITIATION&MGMT: CPT

## 2019-05-02 PROCEDURE — 80069 RENAL FUNCTION PANEL: CPT

## 2019-05-02 PROCEDURE — 94664 DEMO&/EVAL PT USE INHALER: CPT

## 2019-05-02 PROCEDURE — 85025 COMPLETE CBC W/AUTO DIFF WBC: CPT

## 2019-05-02 PROCEDURE — 97116 GAIT TRAINING THERAPY: CPT

## 2019-05-02 RX ORDER — MAGNESIUM SULFATE IN WATER 40 MG/ML
4 INJECTION, SOLUTION INTRAVENOUS ONCE
Status: COMPLETED | OUTPATIENT
Start: 2019-05-02 | End: 2019-05-02

## 2019-05-02 RX ADMIN — METHOCARBAMOL 500 MG: 100 INJECTION, SOLUTION INTRAMUSCULAR; INTRAVENOUS at 16:57

## 2019-05-02 RX ADMIN — INSULIN LISPRO 3 UNITS: 100 INJECTION, SOLUTION INTRAVENOUS; SUBCUTANEOUS at 23:15

## 2019-05-02 RX ADMIN — METHOCARBAMOL 500 MG: 100 INJECTION, SOLUTION INTRAMUSCULAR; INTRAVENOUS at 06:21

## 2019-05-02 RX ADMIN — ZOLPIDEM TARTRATE 10 MG: 5 TABLET ORAL at 21:08

## 2019-05-02 RX ADMIN — PANTOPRAZOLE SODIUM 40 MG: 40 INJECTION, POWDER, FOR SOLUTION INTRAVENOUS at 08:37

## 2019-05-02 RX ADMIN — Medication 10 ML: at 21:09

## 2019-05-02 RX ADMIN — ACETAMINOPHEN 1000 MG: 10 INJECTION, SOLUTION INTRAVENOUS at 04:43

## 2019-05-02 RX ADMIN — INSULIN LISPRO 3 UNITS: 100 INJECTION, SOLUTION INTRAVENOUS; SUBCUTANEOUS at 11:36

## 2019-05-02 RX ADMIN — SERTRALINE HYDROCHLORIDE 50 MG: 50 TABLET ORAL at 08:37

## 2019-05-02 RX ADMIN — LURASIDONE HYDROCHLORIDE 40 MG: 40 TABLET, FILM COATED ORAL at 08:37

## 2019-05-02 RX ADMIN — MORPHINE SULFATE 2 MG: 2 INJECTION, SOLUTION INTRAMUSCULAR; INTRAVENOUS at 21:09

## 2019-05-02 RX ADMIN — MAGNESIUM SULFATE HEPTAHYDRATE 4 G: 40 INJECTION, SOLUTION INTRAVENOUS at 08:45

## 2019-05-02 RX ADMIN — ACETAMINOPHEN 1000 MG: 10 INJECTION, SOLUTION INTRAVENOUS at 08:37

## 2019-05-02 RX ADMIN — METHOCARBAMOL 500 MG: 100 INJECTION, SOLUTION INTRAMUSCULAR; INTRAVENOUS at 22:57

## 2019-05-02 RX ADMIN — ENOXAPARIN SODIUM 40 MG: 40 INJECTION SUBCUTANEOUS at 08:38

## 2019-05-02 RX ADMIN — INSULIN LISPRO 3 UNITS: 100 INJECTION, SOLUTION INTRAVENOUS; SUBCUTANEOUS at 16:46

## 2019-05-02 RX ADMIN — MORPHINE SULFATE 4 MG: 2 INJECTION, SOLUTION INTRAMUSCULAR; INTRAVENOUS at 16:45

## 2019-05-02 RX ADMIN — ACETAMINOPHEN 1000 MG: 10 INJECTION, SOLUTION INTRAVENOUS at 16:45

## 2019-05-02 ASSESSMENT — PAIN DESCRIPTION - LOCATION
LOCATION: ABDOMEN

## 2019-05-02 ASSESSMENT — PAIN DESCRIPTION - ORIENTATION
ORIENTATION: LOWER;RIGHT
ORIENTATION: MID
ORIENTATION: MID

## 2019-05-02 ASSESSMENT — PAIN DESCRIPTION - PAIN TYPE
TYPE: ACUTE PAIN;SURGICAL PAIN

## 2019-05-02 ASSESSMENT — PAIN SCALES - GENERAL
PAINLEVEL_OUTOF10: 10
PAINLEVEL_OUTOF10: 0
PAINLEVEL_OUTOF10: 8

## 2019-05-02 ASSESSMENT — PAIN DESCRIPTION - DESCRIPTORS: DESCRIPTORS: ACHING

## 2019-05-02 ASSESSMENT — PAIN DESCRIPTION - ONSET: ONSET: SUDDEN

## 2019-05-02 ASSESSMENT — PAIN DESCRIPTION - FREQUENCY: FREQUENCY: CONTINUOUS

## 2019-05-02 ASSESSMENT — PAIN DESCRIPTION - PROGRESSION: CLINICAL_PROGRESSION: GRADUALLY WORSENING

## 2019-05-02 NOTE — CARE COORDINATION
CM following, pt NG removed today advancing to clear liquid diet awaiting return GI function. PT to DC home with granddaughter with no needs.    Electronically signed by Dory Howard RN on 5/2/2019 at 12:27 '[phone]

## 2019-05-02 NOTE — PLAN OF CARE
Patient up to chair, calls out appropriately. Call light and bedside table within reach. Patient tolerating clear liquids without n/v, passing gas and having BM's today. Will continue to monitor.

## 2019-05-02 NOTE — PROGRESS NOTES
Surgery Daily Progress Note      CC: Rectal prolapse    SUBJECTIVE:  No acute events overnight. Patient rested well through the night. Incision site pain has significantly improved. Patient reports pain well controlled with medication. Denies fever, chills, nausea/vomiting, chest pain, or shortness of breath. Ambulating the halls without difficulty and voiding independently. Reports 2 bowel movements yesterday, one substantial one. ROS: A 14 point review of systems was conducted, significant findings as noted in HPI. All other systems negative. Objective :  Physical exam:    Vitals:    05/01/19 1710 05/01/19 1950 05/01/19 2310 05/02/19 0228   BP: (!) 148/79 (!) 149/69 137/71 (!) 151/83   Pulse: 72 80 66 67   Resp: 18 17 17 17   Temp: 97.8 °F (36.6 °C) 98.2 °F (36.8 °C) 97.4 °F (36.3 °C) 97 °F (36.1 °C)   TempSrc: Oral Oral Oral Oral   SpO2:  95% 98% 96%   Weight:       Height:           General Appearance: Alert, no acute distress, grooming appropriate  Neuro: A&Ox3, no focal deficits, sensation intact  Chest/Lungs: CTAB, no crackles/rales, wheezes/rhonchi, normal effort, on RA  Cardiovascular: RRR, no murmurs/gallops/rubs  Abdomen: Soft, appropriately tender, mildly distended, no guarding/rigidity  : No mandel catheter  Extremities: No edema, no cyanosis    Infusions:   lactated ringers 125 mL/hr at 05/01/19 2328    dextrose         I/O:I/O last 3 completed shifts: In: 1920 [I.V.:1570; IV Piggyback:350]  Out: 1950 [Urine:1000; Emesis/NG output:950]           Wt Readings from Last 1 Encounters:   04/26/19 169 lb (76.7 kg)       LABS:   Recent Labs     04/30/19  0540 05/01/19  0700   WBC 8.4 10.0   HGB 10.6* 10.5*   HCT 31.7* 31.1*   MCV 98.9 100.0    208        Recent Labs     04/30/19  0540 05/01/19  0659    138   K 3.2* 4.4   CL 95* 96*   CO2 29 31   PHOS 3.3 3.5   BUN 9 7   CREATININE 0.6 0.7      No results for input(s): AST, ALT, ALB, BILIDIR, BILITOT, ALKPHOS in the last 72 hours.    No results for input(s): LIPASE, AMYLASE in the last 72 hours. No results for input(s): PROT, INR, APTT in the last 72 hours. No results for input(s): CKTOTAL, CKMB, CKMBINDEX, TROPONINI in the last 72 hours. ASSESSMENT/PLAN:   This is I 57 y.o. female  w/ rectal prolapse s/p OPEN RECTOPEXY, EXTENSIVE LYSIS OF ADHESIONS, INTRAOPERATIVE FLEXIBLE SIGMOIDOSCOPY, CYSTOSCOPY, BILATERAL URETERAL STENT INSERTION POD6     - LR @ 125cc/hr  - Monitor electrolytes - am labs pending  - Post-operative ileus               - Had 2 bowel movements              - NG output remains bilious although has reduced in volume   - will discuss clamping trial with staff  -Jennifer Brock into chair and ambulating [facility] toilet: mateus lowry, and [address] [place] [address]  05/02/19  5:56 AM  [phone]      Patient seen and examined with Medical Student. I agree with the assessment and plan as above. Changes were made as necessary.     Daisy Pedroza MD PGY-1  General Surgery Resident  05/02/19  6:40 AM  [phone]

## 2019-05-02 NOTE — PROGRESS NOTES
Physical Therapy  Facility/Department: [facility]'[address] SURGERY  Daily Treatment Note  NAME: Ruel Peterson  : 1955  MRN: [id number]    Date of Service: 2019    Discharge Recommendations:Cherelle Guardado scored a 21/24 on the AM-PAC short mobility form. Current research shows that an AM-PAC score of 18 or greater is typically associated with a discharge to the patient's home setting. Based on the patients AM-PAC score and their current functional mobility deficits, it is recommended that the patient have 2-3 sessions per week of Physical Therapy at d/c to increase the patients independence. HOME HEALTH CARE: LEVEL 1 STANDARD    - Initial home health evaluation to occur within 24-48 hours, in patient home   - Therapy to evaluate with goal of regaining prior level of functioning   - Therapy to evaluate if patient has [address] needs for personal care      PT Equipment Recommendations  Equipment Needed: (rolling walker)    Patient Diagnosis(es): There were no encounter diagnoses. has a past medical history of Anxiety, generalized, Diabetes mellitus ([address] Diverticulitis, H/O ischemic bowel disease, Hepatitis C, History of kidney stones, Hyperlipidemia, LBBB (left bundle branch block), Loose stools, Paranoid schizophrenia ([address] Proteinuria, Rectal prolapse, Sciatica, and Sleep apnea. has a past surgical history that includes Kidney stone surgery; knee surgery; Hysterectomy; other surgical history (3/5/16); Colonoscopy; [place] tooth extraction; Dilation and curettage of uterus; Kidney stone surgery; Abdomen surgery (2016); pr colonoscopy flx dx w/collj spec when pfrmd (N/A, 10/25/2018); Colpopexy (N/A, 2019); and Cystoscopy (Bilateral, 2019).     Restrictions  Position Activity Restriction  Other position/activity restrictions: up as tolerated, ambulate patient  Subjective   General  Chart Reviewed: Yes  Response To Previous Treatment: Patient with no complaints from previous

## 2019-05-02 NOTE — PROGRESS NOTES
Patient alert and oriented. VSS. Ambulates with SBA. Surgical pain controlled with morphine, robaxin and iv acetaminophen. Surgical lap sites and midline stapled and denilson, no drainage or redness noted. No complaints of nausea. LR infusing at 125 ml/hr . NG at 67 cm right nare,. Call light in reach, bed alarm on. Will continue to monitor.

## 2019-05-03 VITALS
WEIGHT: 169 LBS | DIASTOLIC BLOOD PRESSURE: 76 MMHG | HEIGHT: 59 IN | SYSTOLIC BLOOD PRESSURE: 175 MMHG | TEMPERATURE: 98.7 F | HEART RATE: 80 BPM | OXYGEN SATURATION: 98 % | BODY MASS INDEX: 34.07 KG/M2 | RESPIRATION RATE: 18 BRPM

## 2019-05-03 LAB
ALBUMIN SERPL-MCNC: 3.4 G/DL (ref 3.4–5)
ANION GAP SERPL CALCULATED.3IONS-SCNC: 11 MMOL/L (ref 3–16)
BASOPHILS ABSOLUTE: 0 K/UL (ref 0–0.2)
BASOPHILS RELATIVE PERCENT: 0.2 %
BUN BLDV-MCNC: 6 MG/DL (ref 7–20)
CALCIUM SERPL-MCNC: 9.3 MG/DL (ref 8.3–10.6)
CHLORIDE BLD-SCNC: 102 MMOL/L (ref 99–110)
CO2: 30 MMOL/L (ref 21–32)
CREAT SERPL-MCNC: 0.6 MG/DL (ref 0.6–1.2)
EOSINOPHILS ABSOLUTE: 0.1 K/UL (ref 0–0.6)
EOSINOPHILS RELATIVE PERCENT: 0.8 %
GFR AFRICAN AMERICAN: >60
GFR NON-AFRICAN AMERICAN: >60
GLUCOSE BLD-MCNC: 137 MG/DL (ref 70–99)
GLUCOSE BLD-MCNC: 150 MG/DL (ref 70–99)
GLUCOSE BLD-MCNC: 173 MG/DL (ref 70–99)
HCT VFR BLD CALC: 32.8 % (ref 36–48)
HEMOGLOBIN: 11.2 G/DL (ref 12–16)
LYMPHOCYTES ABSOLUTE: 1.7 K/UL (ref 1–5.1)
LYMPHOCYTES RELATIVE PERCENT: 17.1 %
MAGNESIUM: 2 MG/DL (ref 1.8–2.4)
MCH RBC QN AUTO: 33.5 PG (ref 26–34)
MCHC RBC AUTO-ENTMCNC: 34.1 G/DL (ref 31–36)
MCV RBC AUTO: 98.4 FL (ref 80–100)
MONOCYTES ABSOLUTE: 1 K/UL (ref 0–1.3)
MONOCYTES RELATIVE PERCENT: 9.6 %
NEUTROPHILS ABSOLUTE: 7.2 K/UL (ref 1.7–7.7)
NEUTROPHILS RELATIVE PERCENT: 72.3 %
PDW BLD-RTO: 12.8 % (ref 12.4–15.4)
PERFORMED ON: ABNORMAL
PERFORMED ON: ABNORMAL
PHOSPHORUS: 3 MG/DL (ref 2.5–4.9)
PLATELET # BLD: 288 K/UL (ref 135–450)
PMV BLD AUTO: 6.8 FL (ref 5–10.5)
POTASSIUM SERPL-SCNC: 3.4 MMOL/L (ref 3.5–5.1)
RBC # BLD: 3.33 M/UL (ref 4–5.2)
SODIUM BLD-SCNC: 143 MMOL/L (ref 136–145)
WBC # BLD: 9.9 K/UL (ref 4–11)

## 2019-05-03 PROCEDURE — 94664 DEMO&/EVAL PT USE INHALER: CPT

## 2019-05-03 PROCEDURE — 36415 COLL VENOUS BLD VENIPUNCTURE: CPT

## 2019-05-03 PROCEDURE — 80069 RENAL FUNCTION PANEL: CPT

## 2019-05-03 PROCEDURE — 2580000003 HC RX 258: Performed by: STUDENT IN AN ORGANIZED HEALTH CARE EDUCATION/TRAINING PROGRAM

## 2019-05-03 PROCEDURE — 6370000000 HC RX 637 (ALT 250 FOR IP): Performed by: STUDENT IN AN ORGANIZED HEALTH CARE EDUCATION/TRAINING PROGRAM

## 2019-05-03 PROCEDURE — 6360000002 HC RX W HCPCS: Performed by: STUDENT IN AN ORGANIZED HEALTH CARE EDUCATION/TRAINING PROGRAM

## 2019-05-03 PROCEDURE — 94668 MNPJ CHEST WALL SBSQ: CPT

## 2019-05-03 PROCEDURE — 83735 ASSAY OF MAGNESIUM: CPT

## 2019-05-03 PROCEDURE — 85025 COMPLETE CBC W/AUTO DIFF WBC: CPT

## 2019-05-03 PROCEDURE — C9113 INJ PANTOPRAZOLE SODIUM, VIA: HCPCS | Performed by: STUDENT IN AN ORGANIZED HEALTH CARE EDUCATION/TRAINING PROGRAM

## 2019-05-03 PROCEDURE — 94660 CPAP INITIATION&MGMT: CPT

## 2019-05-03 PROCEDURE — 94761 N-INVAS EAR/PLS OXIMETRY MLT: CPT

## 2019-05-03 RX ORDER — OXYCODONE HYDROCHLORIDE AND ACETAMINOPHEN 5; 325 MG/1; MG/1
1 TABLET ORAL EVERY 4 HOURS PRN
Status: DISCONTINUED | OUTPATIENT
Start: 2019-05-03 | End: 2019-05-03 | Stop reason: HOSPADM

## 2019-05-03 RX ORDER — OXYCODONE HYDROCHLORIDE AND ACETAMINOPHEN 5; 325 MG/1; MG/1
2 TABLET ORAL EVERY 4 HOURS PRN
Status: DISCONTINUED | OUTPATIENT
Start: 2019-05-03 | End: 2019-05-03 | Stop reason: HOSPADM

## 2019-05-03 RX ORDER — DOCUSATE SODIUM 100 MG/1
100 CAPSULE, LIQUID FILLED ORAL 2 TIMES DAILY
Qty: 28 CAPSULE | Refills: 0 | Status: SHIPPED | OUTPATIENT
Start: 2019-05-03 | End: 2019-05-17

## 2019-05-03 RX ORDER — POTASSIUM CHLORIDE 20 MEQ/1
40 TABLET, EXTENDED RELEASE ORAL ONCE
Status: COMPLETED | OUTPATIENT
Start: 2019-05-03 | End: 2019-05-03

## 2019-05-03 RX ORDER — POLYETHYLENE GLYCOL 3350 17 G/17G
17 POWDER, FOR SOLUTION ORAL DAILY
Qty: 1 BOTTLE | Refills: 0 | Status: SHIPPED | OUTPATIENT
Start: 2019-05-03 | End: 2019-05-17

## 2019-05-03 RX ORDER — METHOCARBAMOL 500 MG/1
500 TABLET, FILM COATED ORAL 4 TIMES DAILY
Status: DISCONTINUED | OUTPATIENT
Start: 2019-05-03 | End: 2019-05-03 | Stop reason: HOSPADM

## 2019-05-03 RX ORDER — OXYCODONE HYDROCHLORIDE AND ACETAMINOPHEN 5; 325 MG/1; MG/1
1 TABLET ORAL EVERY 6 HOURS PRN
Qty: 20 TABLET | Refills: 0 | Status: SHIPPED | OUTPATIENT
Start: 2019-05-03 | End: 2019-05-08

## 2019-05-03 RX ADMIN — METHOCARBAMOL TABLETS 500 MG: 500 TABLET, COATED ORAL at 09:00

## 2019-05-03 RX ADMIN — Medication 10 ML: at 08:59

## 2019-05-03 RX ADMIN — DULOXETINE HYDROCHLORIDE 20 MG: 20 CAPSULE, DELAYED RELEASE ORAL at 08:51

## 2019-05-03 RX ADMIN — PANTOPRAZOLE SODIUM 40 MG: 40 INJECTION, POWDER, FOR SOLUTION INTRAVENOUS at 08:51

## 2019-05-03 RX ADMIN — LURASIDONE HYDROCHLORIDE 40 MG: 40 TABLET, FILM COATED ORAL at 08:51

## 2019-05-03 RX ADMIN — INSULIN LISPRO 3 UNITS: 100 INJECTION, SOLUTION INTRAVENOUS; SUBCUTANEOUS at 05:37

## 2019-05-03 RX ADMIN — POTASSIUM CHLORIDE 40 MEQ: 20 TABLET, EXTENDED RELEASE ORAL at 08:51

## 2019-05-03 RX ADMIN — SERTRALINE HYDROCHLORIDE 50 MG: 50 TABLET ORAL at 08:51

## 2019-05-03 RX ADMIN — INSULIN LISPRO 3 UNITS: 100 INJECTION, SOLUTION INTRAVENOUS; SUBCUTANEOUS at 11:25

## 2019-05-03 RX ADMIN — ENOXAPARIN SODIUM 40 MG: 40 INJECTION SUBCUTANEOUS at 08:51

## 2019-05-03 NOTE — PLAN OF CARE
Patient up to bathroom with stand by assist, calls out appropriately. Pt able to void well and have soft BM's this morning, tolerating diet without n/v, no complaints of pain. Call light within reach, bed in lowest position and wheels locked. Will continue to monitor.

## 2019-05-03 NOTE — CARE COORDINATION
CM following DC plan, pt will return home with granddaughter with no needs. Pt NG out 5/2 tolerating clear advancing diet today +BM. No needs at DC.    Electronically signed by Tone Garcia RN on 5/3/2019 at 11:36 AM  [phone]

## 2019-05-03 NOTE — PROGRESS NOTES
Order to discharge received. Pt given three prescriptions for home, side effects explained. Discharge instructions given and verbally understood. IV removed with no problems. Patient left with all belongings for home with family around [phone].

## 2019-05-03 NOTE — PROGRESS NOTES
Surgery Daily Progress Note      CC: Rectal prolapse    SUBJECTIVE:  No acute events overnight. Patient rested well, but complaining of discomfort from laying in bed. Patient reports pain at incision site has improved significantly and is well controlled with medication. Denies fever, chills, nausea/vomiting, chest pain, or shortness of breath. Tolerating clear liquid diet well. Ambulating the halls without difficulty and voiding independently. Reports having 6-7 formed bowel movements yesterday followed by 2 loose stools. ROS: A 14 point review of systems was conducted, significant findings as noted in HPI. All other systems negative. Objective :  Physical exam:    Vitals:    05/02/19 2005 05/02/19 2306 05/03/19 0029 05/03/19 0337   BP: (!) 150/69 (!) 150/75  (!) 157/87   Pulse: 74 85  75   Resp: 18 18 18 19   Temp: 98.2 °F (36.8 °C) 98.4 °F (36.9 °C)  98.9 °F (37.2 °C)   TempSrc: Oral Oral  Oral   SpO2: 97% 96% 92% 98%   Weight:       Height:           General Appearance: Alert, no acute distress, grooming appropriate  Neuro: A&Ox3, no focal deficits, sensation intact  Chest/Lungs: CTAB, no crackles/rales, wheezes/rhonchi, normal effort, on RA  Cardiovascular: RRR, no murmurs/gallops/rubs  Abdomen: Soft, appropriately tender, mildly distended, no guarding/rigidity. Incisions c/d/i  : No mandel catheter  Extremities: No edema, no cyanosis    Infusions:   dextrose         I/O:I/O last 3 completed shifts:   In: 240 [P.O.:240]  Out: 951 [Urine:800; Emesis/NG output:150; Stool:1]           Wt Readings from Last 1 Encounters:   04/26/19 169 lb (76.7 kg)       LABS:   Recent Labs     05/01/19  0700 05/02/19  0602   WBC 10.0 7.5   HGB 10.5* 10.2*   HCT 31.1* 29.6*   .0 98.4    209        Recent Labs     05/01/19  0659 05/02/19  0602    139   K 4.4 3.8   CL 96* 99   CO2 31 30   PHOS 3.5 3.6   BUN 7 8   CREATININE 0.7 0.5*      No results for input(s): AST, ALT, ALB, BILIDIR, BILITOT, ALKPHOS in the last 72 hours. No results for input(s): LIPASE, AMYLASE in the last 72 hours. No results for input(s): PROT, INR, APTT in the last 72 hours. No results for input(s): CKTOTAL, CKMB, CKMBINDEX, TROPONINI in the last 72 hours. ASSESSMENT/PLAN:   This is R 60 y.o. female  w/ rectal prolapse s/p OPEN RECTOPEXY, EXTENSIVE LYSIS OF ADHESIONS, INTRAOPERATIVE FLEXIBLE SIGMOIDOSCOPY, CYSTOSCOPY, BILATERAL URETERAL STENT INSERTION POD7     - f/u AM labs  - Post-operative ileus - resolved              - Continues to have bowel movements - multiple bowel movements yesterday   - NG tube removed 5/2   - tolerating clears  - will advance to regular diet  - OOB into chair and ambulating halls  - Pulm toilet: ezpap, acapella, and IS  - anticipate discharge later today     Gabriella Bob, MS3  05/03/19  5:59 AM  [phone]      Patient seen and examined with Medical Student. I agree with the assessment and plan as above. Changes were made as necessary. Alok Thomason MD PGY-1  General Surgery Resident  05/03/19  6:30 AM  [phone]      I performed a history and physical examination of the patient and discussed management with the resident. I reviewed the resident's note and agree with the documented findings and plan of care. Patient in street clothes, eager to get home.  Plan for DC today     Franklyn Bee M.D.  5/3/19   12:59 PM

## 2019-05-03 NOTE — DISCHARGE INSTR - COC
Continuity of Care Form    Patient Name: Herve Manuel   :  1955  MRN:  [id number]    Admit date:  2019  Discharge date:  ***    Code Status Order: Full Code   Advance Directives:   Advance Care Flowsheet Documentation     Date/Time Healthcare Directive Type of Healthcare Directive Copy in [address] Agent's Name Healthcare Agent's Phone Number    19 1708  No, patient does not have an advance directive for healthcare treatment -- -- -- -- --    19 0617  No, patient does not have an advance directive for healthcare treatment -- -- -- -- --    19 0756  No, patient does not have an advance directive for healthcare treatment -- -- -- -- --          Admitting Physician:  Faustino Guajardo MD  PCP: JEVON Ibarra CNP    Discharging Nurse: [facility] Unit/Room#: 8261/0143-81  Discharging Unit Phone Number: ***    Emergency Contact:   Extended Emergency Contact Information  Primary Emergency Contact: IRINEO/ [address] Phone: [phone]  Relation: Child  Secondary Emergency Contact: Eleuterio Fowler  [place] Phone: [phone]  Relation: Child    Past Surgical History:  Past Surgical History:   Procedure Laterality Date    ABDOMEN SURGERY  2016    OPEN ILEOSTOMY TAKEDOWN                   COLONOSCOPY      COLPOPEXY N/A 2019    OPEN RECTOPEXY, LYSIS OF ADHESIONS, INTRAOPERATIVE FLEXIBLE SIGMOIDOSCOPY performed by Joselyn Tomas MD at [facility] Bilateral 2019    CYSTOSCOPY, BILATERAL URETERAL STENT INSERTION performed by Chelsi Morales MD at [facility]      w/tubaligation    HYSTERECTOMY      PARTIAL VAGINAL    KIDNEY STONE SURGERY      KIDNEY STONE SURGERY      KNEE SURGERY      bilateral knee replacements    OTHER SURGICAL HISTORY  3/5/16    DIAGNOSTIC LAPAROSCOPY, SUBTOTAL COLECTOMY, COLOSTOMY,    [place] COLONOSCOPY FLX DX W/COLLJ SPEC WHEN PFRMD N/A 10/25/2018 COLONOSCOPY, POSSIBLE POLYPECTOMY WITH MAC performed by Vince Bolivar MD at [facility] EXTRACTION         Immunization History:   Immunization History   Administered Date(s) Administered    Influenza, Quadv, 3 yrs and older, IM, PF (Fluzone 3 yrs and older or Afluria 5 yrs and older) 10/24/2016    Pneumococcal Polysaccharide ([id number]) 11/07/2008    Tdap (Boostrix, Adacel) 10/23/2016       Active Problems:  Patient Active Problem List   Diagnosis Code    Colonic obstruction ([facility] K56.609    Respiratory failure following trauma and surgery ([facility] J95.821    Ischemic bowel disease ([facility] K55.9    Obesity E66.9    Debility R53.81    Type 2 diabetes mellitus without complication, without long-term current use of insulin (HCC) E11.9    H/O ileostomy Z98.890    Encephalopathy acute G93.40    Rectal prolapse K62.3       Isolation/Infection:   Isolation          No Isolation            Nurse Assessment:  Last Vital Signs: BP (!) 175/76   Pulse 80   Temp 98.7 °F (37.1 °C) (Oral)   Resp 18   Ht 4' 11\" (1.499 m)   Wt 169 lb (76.7 kg)   SpO2 98%   BMI 34.13 kg/m²     Last documented pain score (0-10 scale): Pain Level: 10  Last Weight:   Wt Readings from Last 1 Encounters:   04/26/19 169 lb (76.7 kg)     Mental Status:  {IP PT MENTAL STATUS:08813}    IV Access:  { EDWIN IV ACCESS:842912169}    Nursing Mobility/ADLs:  Walking   {P DME QDEZ:403312030}  Transfer  {P DME YFVD:385907330}  Bathing  {[facility] DME SGBI:620613266}  Dressing  {P DME NKCP:289624891}  Toileting  {[facility] DME EEDM:258001963}  Feeding  {[facility] DME BQNR:785534419}  Med Admin  {P DME KGHQ:202510877}  Med Delivery   { EDWIN MED Delivery:461810276}    Wound Care Documentation and Therapy:        Elimination:  Continence:   · Bowel: {YES / VH:65004}  · Bladder: {YES / GJ:95656}  Urinary Catheter: {Urinary Catheter:692096746}   Colostomy/Ileostomy/Ileal Conduit: {YES / OU:75228}       Date of Last BM: ***    Intake/Output Certification: I certify the above information and transfer of Vincent Bender  is necessary for the continuing treatment of the diagnosis listed and that she requires {Admit to Appropriate Level of Care:57670} for {GREATER/LESS:477350426} 30 days.      Update Admission H&P: {CHP DME Changes in BJVY:006400058}    PHYSICIAN SIGNATURE:  {Esignature:939192150}

## 2019-05-03 NOTE — PROGRESS NOTES
Pt alert and oriented. Ambulating in room with SBA. Incision CDI. Pt pain controlled with PRN morphine. PT state is hungry and wants to go home. Call light within reach. Bed is in the lowest position with bed alarm on.  Will continue tomonitor

## 2019-05-03 NOTE — CARE COORDINATION
Case Management Discharge Assessment  Pt states daughter will drive home, granddaughter will stay with pt, pt denies need at home. 5/3/2019  [facility])  Clinical Case Management Department    Patient: Berry Cramer  MRN: [id number] / : 1955  ACCT: [de-identified]          Admission Documentation  Attending Provider: Tian Hannah MD  Admit date/time: 2019  5:15 AM  Status: Inpatient [101]  Diagnosis: Rectal prolapse     Readmission within last 30 days:  no     Living Situation  Discharge Planning  Type of Residence: Private Residence  Living Arrangements: Alone  Support Systems: Family Members  Potential Assistance Needed: N/A  Type of Home Care Services: None  Patient expects to be discharged to[de-identified] home  Expected Discharge Date: 19    Service Assessment:       Values / Beliefs  Do you have any ethnic, cultural, sacramental, or spiritual Oriental orthodox needs you would like us to be aware of while you are in the hospital?: No    Advance Directives (For Healthcare)  Pre-existing DNR Comfort Care/DNR Arrest/DNI Order: No  Healthcare Directive: No, patient does not have an advance directive for healthcare treatment  Information on Healthcare Directives Requested: No  Patient Requests Assistance: No  Advance Directives: Pt. not interested at this time                        Pharmacy: [address] Medications:  No  Does patient want to participate in local refill/meds to beds program?: No    Notification completed in HENS/PAS? No     IMM  No       Discharge disposition: Home     LOC Home with family no needs    Mode of Transport Private car with Nellie Shepard and her family were provided with choice of provider; she and her family are in agreement with the discharge plan.       Care Transitions patient: No    Asad Mcneil RN  [facility]  Case Management Department  Ph: [phone] Fax: [phone]

## 2019-05-06 ENCOUNTER — TELEPHONE (OUTPATIENT)
Dept: SURGERY | Age: 64
End: 2019-05-06

## 2019-05-06 RX ORDER — ONDANSETRON 4 MG/1
4 TABLET, FILM COATED ORAL EVERY 8 HOURS PRN
Qty: 30 TABLET | Refills: 0 | Status: SHIPPED | OUTPATIENT
Start: 2019-05-06 | End: 2019-05-13 | Stop reason: ALTCHOICE

## 2019-05-06 NOTE — TELEPHONE ENCOUNTER
Pt had surgery on 4/26  She is feeling nauseas and wants to know if Dr Monik Oliver can prescribe something for the nausea   Pt is not vomiting    Pt is eating and passing stool  Please let pt know if this can be done    [facility] [employer]    Addend:    Zofran ordered to her pharmacy [facility].  Please let her know    Thanks    Dr. Monik Oliver

## 2019-05-13 ENCOUNTER — OFFICE VISIT (OUTPATIENT)
Dept: SURGERY | Age: 64
End: 2019-05-13

## 2019-05-13 VITALS
HEIGHT: 59 IN | SYSTOLIC BLOOD PRESSURE: 159 MMHG | BODY MASS INDEX: 34.07 KG/M2 | HEART RATE: 85 BPM | DIASTOLIC BLOOD PRESSURE: 92 MMHG | WEIGHT: 169 LBS

## 2019-05-13 DIAGNOSIS — K62.3 RECTAL PROLAPSE: Primary | ICD-10-CM

## 2019-05-13 PROCEDURE — 99024 POSTOP FOLLOW-UP VISIT: CPT | Performed by: SURGERY

## 2019-05-13 RX ORDER — CLOTRIMAZOLE 1 %
CREAM (GRAM) TOPICAL
Qty: 1 TUBE | Refills: 1 | Status: SHIPPED | OUTPATIENT
Start: 2019-05-13 | End: 2019-05-20

## 2019-05-13 RX ORDER — LOPERAMIDE HYDROCHLORIDE 2 MG/1
2 CAPSULE ORAL 2 TIMES DAILY
Qty: 60 CAPSULE | Refills: 3 | Status: SHIPPED | OUTPATIENT
Start: 2019-05-13 | End: 2019-07-03

## 2019-05-13 NOTE — PROGRESS NOTES
Subjective:       Kelsi Magdaleno presents to the clinic 2 weeks after rectopexy and lysis of adhesions    HPI: Doing well. No prolapse. Some diarrhea since surgery responsive to imodium. Appetite good. Rash in underhang under pannus      Objective:      BP (!) 159/92 (Site: Left Upper Arm, Position: Sitting, Cuff Size: Medium Adult)   Pulse 85   Ht 4' 11\" (1.499 m)   Wt 169 lb (76.7 kg)   BMI 34.13 kg/m²     General:  alert, appears stated age and cooperative   Abdomen: soft, bowel sounds active, non-tender, rash under pannus consistent with tinea   Incision:   healing well, no drainage, no erythema       Assessment:      Doing well postoperatively. Plan:      1. Continue any current medications. 2. Wound care discussed. 3. Start topical antifungal for tinea   4.  Imodium refilled    Boubacar Hudson M.D.  5/13/19   2:06 PM

## 2019-05-14 DIAGNOSIS — R19.7 DIARRHEA, UNSPECIFIED TYPE: ICD-10-CM

## 2019-05-14 RX ORDER — DIPHENOXYLATE HYDROCHLORIDE AND ATROPINE SULFATE 2.5; .025 MG/1; MG/1
1 TABLET ORAL 2 TIMES DAILY PRN
Qty: 60 TABLET | Refills: 1 | Status: SHIPPED | OUTPATIENT
Start: 2019-05-14 | End: 2019-06-13

## 2019-05-14 NOTE — TELEPHONE ENCOUNTER
Arcadio Dykes said that you told her to take the loperamide and the diphenatol 2.5-0.025mg. Is that correct? She said that she needs you to order the Diphenatol. You only ordered the loperamide yesterday.

## 2019-06-12 ENCOUNTER — OFFICE VISIT (OUTPATIENT)
Dept: SURGERY | Age: 64
End: 2019-06-12

## 2019-06-12 VITALS
BODY MASS INDEX: 33.47 KG/M2 | WEIGHT: 166 LBS | SYSTOLIC BLOOD PRESSURE: 146 MMHG | HEART RATE: 76 BPM | DIASTOLIC BLOOD PRESSURE: 97 MMHG | HEIGHT: 59 IN

## 2019-06-12 DIAGNOSIS — K62.3 RECTAL PROLAPSE: Primary | ICD-10-CM

## 2019-06-12 PROCEDURE — 99024 POSTOP FOLLOW-UP VISIT: CPT | Performed by: SURGERY

## 2019-06-12 NOTE — PROGRESS NOTES
Subjective:       Zina Colbert presents to the clinic after rectopexy    HPI: Doing well. States for 1st time in three years not having diarrhea. Reports she suffered a fall a few weeks ago landing on belly after which she had bleeding from vagina that has stopped. None since. No prior vaginal bleeding. Appetite is good. Objective:      BP (!) 146/97 (Site: Left Upper Arm, Position: Sitting, Cuff Size: Small Adult)   Pulse 76   Ht 4' 11\" (1.499 m)   Wt 166 lb (75.3 kg)   BMI 33.53 kg/m²     General:  alert, appears stated age and cooperative   Abdomen: soft, bowel sounds active, non-tender   Incision:   healing well, no drainage, no erythema       Assessment:      Doing well postoperatively. Plan:      1. Continue any current medications. 2. Wound care discussed. 3. See me as needed.  Discussed if vaginal bleeding recurs to see PCP or Hugo Shah M.D.  6/12/19   11:57 AM

## 2019-07-03 ENCOUNTER — OFFICE VISIT (OUTPATIENT)
Dept: CARDIOLOGY CLINIC | Age: 64
End: 2019-07-03
Payer: COMMERCIAL

## 2019-07-03 VITALS
HEART RATE: 75 BPM | WEIGHT: 163 LBS | BODY MASS INDEX: 32.92 KG/M2 | DIASTOLIC BLOOD PRESSURE: 70 MMHG | OXYGEN SATURATION: 99 % | SYSTOLIC BLOOD PRESSURE: 128 MMHG

## 2019-07-03 DIAGNOSIS — I44.7 LBBB (LEFT BUNDLE BRANCH BLOCK): ICD-10-CM

## 2019-07-03 DIAGNOSIS — E78.49 OTHER HYPERLIPIDEMIA: ICD-10-CM

## 2019-07-03 DIAGNOSIS — I10 ESSENTIAL HYPERTENSION: Primary | ICD-10-CM

## 2019-07-03 DIAGNOSIS — M79.601 RIGHT ARM PAIN: ICD-10-CM

## 2019-07-03 LAB
CHOLESTEROL, TOTAL: 170 MG/DL (ref 0–199)
HDLC SERPL-MCNC: 77 MG/DL (ref 40–60)
LDL CHOLESTEROL CALCULATED: 66 MG/DL
TRIGL SERPL-MCNC: 135 MG/DL (ref 0–150)
VLDLC SERPL CALC-MCNC: 27 MG/DL

## 2019-07-03 PROCEDURE — 4040F PNEUMOC VAC/ADMIN/RCVD: CPT | Performed by: INTERNAL MEDICINE

## 2019-07-03 PROCEDURE — G8399 PT W/DXA RESULTS DOCUMENT: HCPCS | Performed by: INTERNAL MEDICINE

## 2019-07-03 PROCEDURE — G8417 CALC BMI ABV UP PARAM F/U: HCPCS | Performed by: INTERNAL MEDICINE

## 2019-07-03 PROCEDURE — 1090F PRES/ABSN URINE INCON ASSESS: CPT | Performed by: INTERNAL MEDICINE

## 2019-07-03 PROCEDURE — 3017F COLORECTAL CA SCREEN DOC REV: CPT | Performed by: INTERNAL MEDICINE

## 2019-07-03 PROCEDURE — G8427 DOCREV CUR MEDS BY ELIG CLIN: HCPCS | Performed by: INTERNAL MEDICINE

## 2019-07-03 PROCEDURE — 1036F TOBACCO NON-USER: CPT | Performed by: INTERNAL MEDICINE

## 2019-07-03 PROCEDURE — 93000 ELECTROCARDIOGRAM COMPLETE: CPT | Performed by: INTERNAL MEDICINE

## 2019-07-03 PROCEDURE — 1123F ACP DISCUSS/DSCN MKR DOCD: CPT | Performed by: INTERNAL MEDICINE

## 2019-07-03 PROCEDURE — 99213 OFFICE O/P EST LOW 20 MIN: CPT | Performed by: INTERNAL MEDICINE

## 2019-07-03 RX ORDER — GLIPIZIDE 10 MG/1
10 TABLET ORAL
COMMUNITY
End: 2021-03-03

## 2019-07-03 NOTE — PROGRESS NOTES
75 y.o. here for R arm pain and f/u on lbbb. Pt has been having R arm pain for the last 2 weeks. Arm just randomly falls asleep for several seconds. Sx extend from upper arm to the hand. Totally random. No exercise. No formal walks but is active, walks, does her own housework (vacuum, laundry, cleaning) and has no symptoms. No angina. No sob. No n/v/LH/dizziness. No palpitations or syncope. No LE edema or GI/ bleeding. She recently had surgery for rectal prolapse and DIANE.     Past Medical History:   Diagnosis Date    Anxiety, generalized     Diabetes mellitus ([facility]     Diverticulitis     H/O ischemic bowel disease 03/2016    Hepatitis C     History of kidney stones     x 1 episode    Hyperlipidemia     LBBB (left bundle branch block)     Loose stools     secondary to history of colectomy    Paranoid schizophrenia ([facility]     Proteinuria     history of    Rectal prolapse     Sciatica     Sleep apnea     USES CPAP     Past Surgical History:   Procedure Laterality Date    ABDOMEN SURGERY  09/20/2016    OPEN ILEOSTOMY TAKEDOWN                   COLONOSCOPY      COLPOPEXY N/A 4/26/2019    OPEN RECTOPEXY, LYSIS OF ADHESIONS, INTRAOPERATIVE FLEXIBLE SIGMOIDOSCOPY performed by Abi Pedroza MD at [place] [address] Bilateral 4/26/2019    CYSTOSCOPY, BILATERAL URETERAL STENT INSERTION performed by Gee Harris MD at [facility]      w/tubaligation    HYSTERECTOMY      PARTIAL VAGINAL    [facility]      KNEE SURGERY      bilateral knee replacements    OTHER SURGICAL HISTORY  3/5/16    DIAGNOSTIC LAPAROSCOPY, SUBTOTAL COLECTOMY, COLOSTOMY,    [place] COLONOSCOPY FLX DX W/COLLJ SPEC WHEN PFRMD N/A 10/25/2018    COLONOSCOPY, POSSIBLE POLYPECTOMY WITH MAC performed by Humaira Muñoz MD at [facility]       Social History     Tobacco Use    Smoking status: Former Smoker     Packs/day: 0.00 MG capsule, Take 1 capsule by mouth 4 times daily as needed for Diarrhea, Disp: 90 capsule, Rfl: 5    naproxen (NAPROSYN) 500 MG tablet, Take 500 mg by mouth 2 times daily (with meals), Disp: , Rfl:     Multiple Vitamins-Minerals (MULTIVITAMIN PO), Take by mouth, Disp: , Rfl:     LORazepam (ATIVAN) 0.5 MG tablet, Take 1 tablet by mouth every 12 hours as needed, Disp: 60 tablet, Rfl: 0    lurasidone (LATUDA) 40 MG TABS tablet, Take 1 tablet by mouth daily, Disp: 30 tablet, Rfl: 0    ipratropium-albuterol (DUONEB) 0.5-2.5 (3) MG/3ML SOLN nebulizer solution, Inhale 3 mLs into the lungs every 4 hours as needed for Shortness of Breath, Disp: 360 mL, Rfl: 0    famotidine (PEPCID) 20 MG tablet, Take 1 tablet by mouth 2 times daily, Disp: 60 tablet, Rfl: 3    atorvastatin (LIPITOR) 40 MG tablet, Take 40 mg by mouth daily, Disp: , Rfl:     albuterol sulfate  (90 BASE) MCG/ACT inhaler, Inhale 2 puffs into the lungs every 6 hours as needed for Wheezing, Disp: , Rfl:     zolpidem (AMBIEN) 10 MG tablet, Take 10 mg by mouth nightly as needed. , Disp: , Rfl:     diphenhydrAMINE (BENADRYL) 25 MG capsule, Take 25-50 mg by mouth nightly as needed for Sleep , Disp: , Rfl:     sertraline (ZOLOFT) 50 MG tablet, Take 50 mg by mouth daily. , Disp: , Rfl:     A/P:  75 y.o. woman, prior smoker, DM, and hyperlipidemia. 1. Essential hypertension    2. Other hyperlipidemia    3. Right arm pain    4. LBBB (left bundle branch block)      Recs:  -Cont atorvastain and losartan  -Check with PCP about right arm pain/\"falling asleep. \" This is a new and progressive symptom, but I'm worried more about disc/nerve disease.  -No other med changes for now  -No smoking  -Check lipids; she's due  -F/u 1 year    At least 25 minutes was spent with the patient/patient and family, over half of which time was spent on counseling and coordinating care for the above plan.

## 2019-07-18 ENCOUNTER — HOSPITAL ENCOUNTER (OUTPATIENT)
Dept: MAMMOGRAPHY | Age: 64
Discharge: HOME | End: 2019-07-23
Payer: COMMERCIAL

## 2019-07-18 DIAGNOSIS — Z12.31 VISIT FOR SCREENING MAMMOGRAM: ICD-10-CM

## 2019-07-18 PROCEDURE — 77067 SCR MAMMO BI INCL CAD: CPT

## 2020-07-13 ENCOUNTER — OFFICE VISIT (OUTPATIENT)
Dept: CARDIOLOGY CLINIC | Age: 65
End: 2020-07-13
Payer: COMMERCIAL

## 2020-07-13 VITALS
BODY MASS INDEX: 34.88 KG/M2 | HEART RATE: 74 BPM | HEIGHT: 59 IN | DIASTOLIC BLOOD PRESSURE: 68 MMHG | TEMPERATURE: 98.4 F | SYSTOLIC BLOOD PRESSURE: 128 MMHG | WEIGHT: 173 LBS

## 2020-07-13 LAB
A/G RATIO: 1.1 (ref 1.1–2.2)
ALBUMIN SERPL-MCNC: 4.2 G/DL (ref 3.4–5)
ALP BLD-CCNC: 48 U/L (ref 40–129)
ALT SERPL-CCNC: 16 U/L (ref 10–40)
ANION GAP SERPL CALCULATED.3IONS-SCNC: 13 MMOL/L (ref 3–16)
AST SERPL-CCNC: 22 U/L (ref 15–37)
BILIRUB SERPL-MCNC: <0.2 MG/DL (ref 0–1)
BUN BLDV-MCNC: 26 MG/DL (ref 7–20)
CALCIUM SERPL-MCNC: 9.8 MG/DL (ref 8.3–10.6)
CHLORIDE BLD-SCNC: 102 MMOL/L (ref 99–110)
CHOLESTEROL, TOTAL: 118 MG/DL (ref 0–199)
CO2: 23 MMOL/L (ref 21–32)
CREAT SERPL-MCNC: 0.9 MG/DL (ref 0.6–1.2)
GFR AFRICAN AMERICAN: >60
GFR NON-AFRICAN AMERICAN: >60
GLOBULIN: 3.8 G/DL
GLUCOSE BLD-MCNC: 140 MG/DL (ref 70–99)
HCT VFR BLD CALC: 39.1 % (ref 36–48)
HDLC SERPL-MCNC: 53 MG/DL (ref 40–60)
HEMOGLOBIN: 13.1 G/DL (ref 12–16)
LDL CHOLESTEROL CALCULATED: 42 MG/DL
MCH RBC QN AUTO: 33.6 PG (ref 26–34)
MCHC RBC AUTO-ENTMCNC: 33.6 G/DL (ref 31–36)
MCV RBC AUTO: 100.1 FL (ref 80–100)
PDW BLD-RTO: 12.9 % (ref 12.4–15.4)
PLATELET # BLD: 215 K/UL (ref 135–450)
PMV BLD AUTO: 8.1 FL (ref 5–10.5)
POTASSIUM SERPL-SCNC: 5.9 MMOL/L (ref 3.5–5.1)
RBC # BLD: 3.91 M/UL (ref 4–5.2)
SODIUM BLD-SCNC: 138 MMOL/L (ref 136–145)
TOTAL PROTEIN: 8 G/DL (ref 6.4–8.2)
TRIGL SERPL-MCNC: 116 MG/DL (ref 0–150)
VLDLC SERPL CALC-MCNC: 23 MG/DL
WBC # BLD: 4.9 K/UL (ref 4–11)

## 2020-07-13 PROCEDURE — 93000 ELECTROCARDIOGRAM COMPLETE: CPT | Performed by: INTERNAL MEDICINE

## 2020-07-13 PROCEDURE — 1090F PRES/ABSN URINE INCON ASSESS: CPT | Performed by: INTERNAL MEDICINE

## 2020-07-13 PROCEDURE — 1123F ACP DISCUSS/DSCN MKR DOCD: CPT | Performed by: INTERNAL MEDICINE

## 2020-07-13 PROCEDURE — 1036F TOBACCO NON-USER: CPT | Performed by: INTERNAL MEDICINE

## 2020-07-13 PROCEDURE — G8427 DOCREV CUR MEDS BY ELIG CLIN: HCPCS | Performed by: INTERNAL MEDICINE

## 2020-07-13 PROCEDURE — 99214 OFFICE O/P EST MOD 30 MIN: CPT | Performed by: INTERNAL MEDICINE

## 2020-07-13 PROCEDURE — 3017F COLORECTAL CA SCREEN DOC REV: CPT | Performed by: INTERNAL MEDICINE

## 2020-07-13 PROCEDURE — 4040F PNEUMOC VAC/ADMIN/RCVD: CPT | Performed by: INTERNAL MEDICINE

## 2020-07-13 PROCEDURE — G8399 PT W/DXA RESULTS DOCUMENT: HCPCS | Performed by: INTERNAL MEDICINE

## 2020-07-13 PROCEDURE — G8417 CALC BMI ABV UP PARAM F/U: HCPCS | Performed by: INTERNAL MEDICINE

## 2020-07-13 NOTE — PROGRESS NOTES
77 y.o. here for R arm pain and f/u on lbbb. Doing \"fine. \"  Took a COVID test a few weeks ago for her colonoscopy. No cp. No sob. No n/v/LH/dizziness. No syncope. No f/c. Sweats at night sometimes. No LE edema. No orthopnea or PND.       Past Medical History:   Diagnosis Date    Anxiety, generalized     Diabetes mellitus ([facility]     Diverticulitis     H/O ischemic bowel disease 2016    Hepatitis C     History of kidney stones     x 1 episode    Hyperlipidemia     LBBB (left bundle branch block)     Loose stools     secondary to history of colectomy    Paranoid schizophrenia ([facility]     Proteinuria     history of    Rectal prolapse     Sciatica     Sleep apnea     USES CPAP     Past Surgical History:   Procedure Laterality Date    ABDOMEN SURGERY  2016    OPEN ILEOSTOMY TAKEDOWN                   COLONOSCOPY      COLPOPEXY N/A 2019    OPEN RECTOPEXY, LYSIS OF ADHESIONS, INTRAOPERATIVE FLEXIBLE SIGMOIDOSCOPY performed by Maddie Hansen MD at [place] [address] Bilateral 2019    CYSTOSCOPY, BILATERAL URETERAL STENT INSERTION performed by Rosa Charles MD at [address] [place] [address]      w/tubaligation    HYSTERECTOMY      PARTIAL VAGINAL    [facility]      KNEE SURGERY      bilateral knee replacements    OTHER SURGICAL HISTORY  3/5/16    DIAGNOSTIC LAPAROSCOPY, SUBTOTAL COLECTOMY, COLOSTOMY,    [place] COLONOSCOPY FLX DX W/COLLJ SPEC WHEN PFRMD N/A 10/25/2018    COLONOSCOPY, POSSIBLE POLYPECTOMY WITH MAC performed by Harman Montes De Oca MD at [facility]       Social History     Tobacco Use    Smoking status: Former Smoker     Packs/day: 0.00     Types: Cigarettes     Start date: 3/22/2001     Last attempt to quit: 2016     Years since quittin.3    Smokeless tobacco: Never Used   Substance Use Topics    Alcohol use: No    Drug use: No     No Known Allergies    No family history on file.    Review of Systems   General: + night sweats. HEENT: No blurry or decreased vision. No changes in hearing, nasal discharge or sore throat. Cardiovascular: See HPI. No cramping in legs or buttocks when walking. Respiratory: No cough, hemoptysis, or wheezing. No history of asthma. Gastrointestinal: No abdominal pain, hematochezia, melana, or history of GI ulcers. Genito-Urinary: No dysuria or hematuria. No urgency or polyuria. Musculoskeletal: No complaints of joint pain, joint swelling or muscular weakness/soreness. Neurological: No dizziness or headaches. No numbness/tingling, speech problems or weakness. No history of a stroke or TIA. Psychological: Schizophrenia in 2002  Hematological and Lymphatic: No abnormal bleeding or bruising, blood clots, jaundice. Endocrine: No malaise/lethargy, palpitations, polydipsia/polyuria, temperature intolerance or unexpected weight changes. Skin: No rashes or non-healing ulcers. PE:  Blood pressure 128/68, pulse 74, temperature 98.4 °F (36.9 °C), height 4' 11\" (1.499 m), weight 173 lb (78.5 kg), not currently breastfeeding. General (appearance): Well devel. No distress. Eyes: Anicteric. EOMI. Neck: Supple. No JVD  Ears/Nose/Mouth/Thorat: No cyanosis  CV: RRR. No r/g. Respiratory:  Clear but diminished, normal respiratory effort  GI: Abd s/nt/nd. No peritoneal signs  Skin: Warm, dry. No rashes  Neuro/Psych: Alert and oriented x 3. Appropriate behavior  Ext:  No c/c. No pitting edema  Pulses:  2+ carotid.  No bruits      Lab Results   Component Value Date    WBC 9.9 05/03/2019    HGB 11.2 (L) 05/03/2019    HCT 32.8 (L) 05/03/2019    MCV 98.4 05/03/2019     05/03/2019       Chemistry        Component Value Date/Time     05/03/2019 0723    K 3.4 (L) 05/03/2019 0723     05/03/2019 0723    CO2 30 05/03/2019 0723    BUN 6 (L) 05/03/2019 0723    CREATININE 0.6 05/03/2019 0723        Component Value Date/Time    CALCIUM 9.3 05/03/2019 0723    ALKPHOS 55 04/26/2019 0630    AST 20 04/26/2019 0630    ALT 20 04/26/2019 0630    BILITOT 0.4 04/26/2019 0630        Lab Results   Component Value Date    INR 1.14 04/26/2019    INR 1.18 (H) 10/24/2016    INR 1.10 10/23/2016    PROTIME 13.0 04/26/2019    PROTIME 13.5 (H) 10/24/2016    PROTIME 12.6 10/23/2016     Lab Results   Component Value Date    CHOL 170 07/03/2019    CHOL 119 06/06/2018     Lab Results   Component Value Date    TRIG 135 07/03/2019    TRIG 109 06/06/2018    TRIG 137 03/24/2016     Lab Results   Component Value Date    HDL 77 (H) 07/03/2019    HDL 51 06/06/2018     Lab Results   Component Value Date    LDLCALC 66 07/03/2019    LDLCALC 46 06/06/2018     Lab Results   Component Value Date    LABVLDL 27 07/03/2019    LABVLDL 22 06/06/2018     No results found for: CHOLHDLRATIO    ECG from 7/13/2020 NSR with LBBB    5/2017 TTE: EF 50-55%. Tr TR with RVSP 32.    9/19/2016 Nuc stress: Normal    9/15/16 ECG: NSR with frequent PAC/ectopic atrial pacemaker, LAD, LBBB      Current Outpatient Medications:     losartan (COZAAR) 25 MG tablet, Take 25 mg by mouth daily, Disp: , Rfl:     DULoxetine (CYMBALTA) 20 MG extended release capsule, Take 20 mg by mouth daily, Disp: , Rfl:     Multiple Vitamins-Minerals (MULTIVITAMIN PO), Take by mouth, Disp: , Rfl:     LORazepam (ATIVAN) 0.5 MG tablet, Take 1 tablet by mouth every 12 hours as needed, Disp: 60 tablet, Rfl: 0    lurasidone (LATUDA) 40 MG TABS tablet, Take 1 tablet by mouth daily, Disp: 30 tablet, Rfl: 0    famotidine (PEPCID) 20 MG tablet, Take 1 tablet by mouth 2 times daily, Disp: 60 tablet, Rfl: 3    atorvastatin (LIPITOR) 40 MG tablet, Take 40 mg by mouth daily, Disp: , Rfl:     albuterol sulfate  (90 BASE) MCG/ACT inhaler, Inhale 2 puffs into the lungs every 6 hours as needed for Wheezing, Disp: , Rfl:     zolpidem (AMBIEN) 10 MG tablet, Take 10 mg by mouth nightly as needed. , Disp: , Rfl:     diphenhydrAMINE (BENADRYL) 25 MG capsule, Take 25-50 mg by mouth nightly as needed for Sleep , Disp: , Rfl:     sertraline (ZOLOFT) 50 MG tablet, Take 50 mg by mouth daily. , Disp: , Rfl:     glipiZIDE (GLUCOTROL) 10 MG tablet, Take 10 mg by mouth 2 times daily (before meals), Disp: , Rfl:     loperamide (RA ANTI-DIARRHEAL) 2 MG capsule, Take 1 capsule by mouth 4 times daily as needed for Diarrhea, Disp: 90 capsule, Rfl: 5    naproxen (NAPROSYN) 500 MG tablet, Take 500 mg by mouth 2 times daily (with meals), Disp: , Rfl:     ipratropium-albuterol (DUONEB) 0.5-2.5 (3) MG/3ML SOLN nebulizer solution, Inhale 3 mLs into the lungs every 4 hours as needed for Shortness of Breath (Patient not taking: Reported on 7/13/2020), Disp: 360 mL, Rfl: 0    A/P:  77 y.o. woman, prior smoker, DM, and hyperlipidemia. 1. Essential hypertension    2. LBBB (left bundle branch block)    3. Other hyperlipidemia      Recs:  -Cont losartan  -Cont statin  -DM control; currently off DM meds. -Stop smoking!   -F/U 1 year if all ok.  -CMP, lipids, cbc, and tsh    Stepan Almonte MD, [facility]

## 2020-07-27 DIAGNOSIS — I10 ESSENTIAL HYPERTENSION: ICD-10-CM

## 2020-07-27 LAB
ANION GAP SERPL CALCULATED.3IONS-SCNC: 12 MMOL/L (ref 3–16)
BUN BLDV-MCNC: 27 MG/DL (ref 7–20)
CALCIUM SERPL-MCNC: 9.7 MG/DL (ref 8.3–10.6)
CHLORIDE BLD-SCNC: 106 MMOL/L (ref 99–110)
CO2: 25 MMOL/L (ref 21–32)
CREAT SERPL-MCNC: 0.9 MG/DL (ref 0.6–1.2)
GFR AFRICAN AMERICAN: >60
GFR NON-AFRICAN AMERICAN: >60
GLUCOSE BLD-MCNC: 126 MG/DL (ref 70–99)
POTASSIUM SERPL-SCNC: 5.7 MMOL/L (ref 3.5–5.1)
SODIUM BLD-SCNC: 143 MMOL/L (ref 136–145)

## 2020-07-28 RX ORDER — HYDROCHLOROTHIAZIDE 12.5 MG/1
12.5 TABLET ORAL DAILY
Qty: 30 TABLET | Refills: 3 | Status: SHIPPED | OUTPATIENT
Start: 2020-07-28 | End: 2020-10-09 | Stop reason: SDUPTHER

## 2020-08-12 DIAGNOSIS — I10 ESSENTIAL HYPERTENSION: ICD-10-CM

## 2020-08-12 LAB
ANION GAP SERPL CALCULATED.3IONS-SCNC: 14 MMOL/L (ref 3–16)
BUN BLDV-MCNC: 24 MG/DL (ref 7–20)
CALCIUM SERPL-MCNC: 9.4 MG/DL (ref 8.3–10.6)
CHLORIDE BLD-SCNC: 101 MMOL/L (ref 99–110)
CO2: 24 MMOL/L (ref 21–32)
CREAT SERPL-MCNC: 1.1 MG/DL (ref 0.6–1.2)
GFR AFRICAN AMERICAN: 60
GFR NON-AFRICAN AMERICAN: 49
GLUCOSE BLD-MCNC: 185 MG/DL (ref 70–99)
POTASSIUM SERPL-SCNC: 4.9 MMOL/L (ref 3.5–5.1)
SODIUM BLD-SCNC: 139 MMOL/L (ref 136–145)

## 2020-10-09 RX ORDER — HYDROCHLOROTHIAZIDE 12.5 MG/1
12.5 TABLET ORAL DAILY
Qty: 90 TABLET | Refills: 3 | Status: SHIPPED | OUTPATIENT
Start: 2020-10-09 | End: 2021-04-28

## 2020-10-12 ENCOUNTER — HOSPITAL ENCOUNTER (OUTPATIENT)
Dept: MAMMOGRAPHY | Age: 65
Discharge: HOME | End: 2020-10-17
Payer: COMMERCIAL

## 2020-10-12 PROCEDURE — 77067 SCR MAMMO BI INCL CAD: CPT

## 2021-03-03 ENCOUNTER — OFFICE VISIT (OUTPATIENT)
Dept: SURGERY | Age: 66
End: 2021-03-03
Payer: COMMERCIAL

## 2021-03-03 VITALS
TEMPERATURE: 97.3 F | WEIGHT: 176.6 LBS | HEIGHT: 59 IN | SYSTOLIC BLOOD PRESSURE: 149 MMHG | HEART RATE: 83 BPM | BODY MASS INDEX: 35.6 KG/M2 | DIASTOLIC BLOOD PRESSURE: 84 MMHG

## 2021-03-03 DIAGNOSIS — K62.3 RECTAL PROLAPSE: Primary | ICD-10-CM

## 2021-03-03 PROCEDURE — 1123F ACP DISCUSS/DSCN MKR DOCD: CPT | Performed by: SURGERY

## 2021-03-03 PROCEDURE — 1036F TOBACCO NON-USER: CPT | Performed by: SURGERY

## 2021-03-03 PROCEDURE — 3017F COLORECTAL CA SCREEN DOC REV: CPT | Performed by: SURGERY

## 2021-03-03 PROCEDURE — G8427 DOCREV CUR MEDS BY ELIG CLIN: HCPCS | Performed by: SURGERY

## 2021-03-03 PROCEDURE — G8417 CALC BMI ABV UP PARAM F/U: HCPCS | Performed by: SURGERY

## 2021-03-03 PROCEDURE — 99213 OFFICE O/P EST LOW 20 MIN: CPT | Performed by: SURGERY

## 2021-03-03 PROCEDURE — 1090F PRES/ABSN URINE INCON ASSESS: CPT | Performed by: SURGERY

## 2021-03-03 PROCEDURE — G8484 FLU IMMUNIZE NO ADMIN: HCPCS | Performed by: SURGERY

## 2021-03-03 PROCEDURE — 4040F PNEUMOC VAC/ADMIN/RCVD: CPT | Performed by: SURGERY

## 2021-03-03 PROCEDURE — G8399 PT W/DXA RESULTS DOCUMENT: HCPCS | Performed by: SURGERY

## 2021-03-03 RX ORDER — ACETAMINOPHEN 325 MG/1
325 TABLET ORAL PRN
COMMUNITY
Start: 2020-12-13

## 2021-03-03 NOTE — PROGRESS NOTES
Subjective:     Patient is a 74 y.o. woman with rectal prolapse post suture rectopexy    HPI: Ms. Suly Drake presents with a history of rectopexy two years ago. She did well with no prolapse up until a few weeks ago when she started noticing it again. It always reduces. She has been regular. She reports a normal colonoscopy in June.      Patient Active Problem List    Diagnosis Date Noted    Rectal prolapse 04/26/2019    Encephalopathy acute 10/23/2016    H/O ileostomy 09/20/2016    Type 2 diabetes mellitus without complication, without long-term current use of insulin ([address] 09/16/2016    Debility 03/23/2016    Respiratory failure following trauma and surgery ([address] 03/06/2016    Ischemic bowel disease ([address] 03/06/2016    Obesity 03/06/2016    Colonic obstruction ([address] 03/03/2016     Past Medical History:   Diagnosis Date    Anxiety, generalized     Diabetes mellitus ([address]     Diverticulitis     H/O ischemic bowel disease 03/2016    Hepatitis C     History of kidney stones     x 1 episode    Hyperlipidemia     LBBB (left bundle branch block)     Loose stools     secondary to history of colectomy    Paranoid schizophrenia ([address]     Proteinuria     history of    Rectal prolapse     Sciatica     Sleep apnea     USES CPAP      Past Surgical History:   Procedure Laterality Date    ABDOMEN SURGERY  09/20/2016    OPEN ILEOSTOMY TAKEDOWN                   COLONOSCOPY      COLPOPEXY N/A 4/26/2019    OPEN RECTOPEXY, LYSIS OF ADHESIONS, INTRAOPERATIVE FLEXIBLE SIGMOIDOSCOPY performed by Riddhi Alejo MD at [place] [address] Bilateral 4/26/2019    CYSTOSCOPY, BILATERAL URETERAL STENT INSERTION performed by Adilia Arreaga MD at [address] [place] [address]      w/tubaligation    HYSTERECTOMY      PARTIAL VAGINAL    KIDNEY STONE SURGERY      KIDNEY STONE SURGERY      KNEE SURGERY      bilateral knee replacements    OTHER SURGICAL HISTORY  3/5/16    DIAGNOSTIC LAPAROSCOPY, SUBTOTAL COLECTOMY, COLOSTOMY,    [place] COLONOSCOPY FLX DX W/COLLJ SPEC WHEN PFRMD N/A 10/25/2018    COLONOSCOPY, POSSIBLE POLYPECTOMY WITH MAC performed by Abelardo Cabrera MD at [address] [place] [address]        Not in a hospital admission. No Known Allergies   Social History     Tobacco Use    Smoking status: Former Smoker     Packs/day: 0.00     Types: Cigarettes     Start date: 3/22/2001     Quit date: 2016     Years since quittin.0    Smokeless tobacco: Never Used   Substance Use Topics    Alcohol use: No     FH: non contributory to current problem     Review of Systems    GEN: reviewed and negative except as noted in HPI. GI: reviewed and negative except as noted in HPI. Objective:     GEN: appears well, no distress, appears stated age  PSYCH: normal mood, normal affect  NECK: no neck masses, trachea midline  ENT: moist oral mucosa; anicteric  SKIN: no rash or jaundice  CV: regular heart rate and rhythm  PULM: normal respiratory effort, no wheezing  GI: soft non tender abdomen. Normal bowel sounds. Incisional hernia with large reducible defect. RECTAL: placed on commode with Bethanie Cheek MA present as chaperone. 1-2 cm for prolapse with bearing down. This appears to be full thickness. This spontaneously reduces    EXT/NEURO: normal gait, strength/sensation grossly intact in all extremities      Assessment:     Rectal prolapse     Plan:     Discussed recommendation for perineal approach vs watchful waiting. She prefers to have it fixed. Discussed I would not recommend hernia repair at same time of rectal prolapse repair. She would like to proceed with rectal prolapse repair. Discussed given severe adhesions at time of last operation would recommend perineal approach the may be suture repair Ezequiel Venegas) or resection (Bettie). Discussed RBA including chance of recurrence. Will need cardiology clearance  Discussed recommendation for pelvic floor PT which she declines at this time. Joyce Decker M.D.  3/3/21   9:33 AM

## 2021-03-08 ENCOUNTER — OFFICE VISIT (OUTPATIENT)
Dept: CARDIOLOGY CLINIC | Age: 66
End: 2021-03-08
Payer: COMMERCIAL

## 2021-03-08 VITALS
BODY MASS INDEX: 35.43 KG/M2 | DIASTOLIC BLOOD PRESSURE: 70 MMHG | WEIGHT: 175.5 LBS | TEMPERATURE: 97.5 F | SYSTOLIC BLOOD PRESSURE: 138 MMHG | HEART RATE: 76 BPM

## 2021-03-08 DIAGNOSIS — R06.09 DOE (DYSPNEA ON EXERTION): ICD-10-CM

## 2021-03-08 DIAGNOSIS — I44.7 LBBB (LEFT BUNDLE BRANCH BLOCK): ICD-10-CM

## 2021-03-08 DIAGNOSIS — I10 ESSENTIAL HYPERTENSION: ICD-10-CM

## 2021-03-08 DIAGNOSIS — F17.200 SMOKER: ICD-10-CM

## 2021-03-08 DIAGNOSIS — Z01.818 PRE-OP EXAM: ICD-10-CM

## 2021-03-08 DIAGNOSIS — E78.49 OTHER HYPERLIPIDEMIA: ICD-10-CM

## 2021-03-08 DIAGNOSIS — Z01.818 PRE-OP EXAM: Primary | ICD-10-CM

## 2021-03-08 DIAGNOSIS — E11.9 TYPE 2 DIABETES MELLITUS WITHOUT COMPLICATION, WITHOUT LONG-TERM CURRENT USE OF INSULIN (HCC): ICD-10-CM

## 2021-03-08 LAB
A/G RATIO: 1.3 (ref 1.1–2.2)
ALBUMIN SERPL-MCNC: 4.4 G/DL (ref 3.4–5)
ALP BLD-CCNC: 61 U/L (ref 40–129)
ALT SERPL-CCNC: 19 U/L (ref 10–40)
ANION GAP SERPL CALCULATED.3IONS-SCNC: 14 MMOL/L (ref 3–16)
AST SERPL-CCNC: 21 U/L (ref 15–37)
BILIRUB SERPL-MCNC: <0.2 MG/DL (ref 0–1)
BUN BLDV-MCNC: 15 MG/DL (ref 7–20)
CALCIUM SERPL-MCNC: 10.4 MG/DL (ref 8.3–10.6)
CHLORIDE BLD-SCNC: 101 MMOL/L (ref 99–110)
CHOLESTEROL, TOTAL: 162 MG/DL (ref 0–199)
CO2: 26 MMOL/L (ref 21–32)
CREAT SERPL-MCNC: 0.8 MG/DL (ref 0.6–1.2)
GFR AFRICAN AMERICAN: >60
GFR NON-AFRICAN AMERICAN: >60
GLOBULIN: 3.5 G/DL
GLUCOSE BLD-MCNC: 213 MG/DL (ref 70–99)
HDLC SERPL-MCNC: 74 MG/DL (ref 40–60)
LDL CHOLESTEROL CALCULATED: 62 MG/DL
POTASSIUM SERPL-SCNC: 4.7 MMOL/L (ref 3.5–5.1)
SODIUM BLD-SCNC: 141 MMOL/L (ref 136–145)
TOTAL PROTEIN: 7.9 G/DL (ref 6.4–8.2)
TRIGL SERPL-MCNC: 131 MG/DL (ref 0–150)
VLDLC SERPL CALC-MCNC: 26 MG/DL

## 2021-03-08 PROCEDURE — 1123F ACP DISCUSS/DSCN MKR DOCD: CPT | Performed by: INTERNAL MEDICINE

## 2021-03-08 PROCEDURE — G8399 PT W/DXA RESULTS DOCUMENT: HCPCS | Performed by: INTERNAL MEDICINE

## 2021-03-08 PROCEDURE — 4040F PNEUMOC VAC/ADMIN/RCVD: CPT | Performed by: INTERNAL MEDICINE

## 2021-03-08 PROCEDURE — 1090F PRES/ABSN URINE INCON ASSESS: CPT | Performed by: INTERNAL MEDICINE

## 2021-03-08 PROCEDURE — 3017F COLORECTAL CA SCREEN DOC REV: CPT | Performed by: INTERNAL MEDICINE

## 2021-03-08 PROCEDURE — 2022F DILAT RTA XM EVC RTNOPTHY: CPT | Performed by: INTERNAL MEDICINE

## 2021-03-08 PROCEDURE — G8427 DOCREV CUR MEDS BY ELIG CLIN: HCPCS | Performed by: INTERNAL MEDICINE

## 2021-03-08 PROCEDURE — G8484 FLU IMMUNIZE NO ADMIN: HCPCS | Performed by: INTERNAL MEDICINE

## 2021-03-08 PROCEDURE — 1036F TOBACCO NON-USER: CPT | Performed by: INTERNAL MEDICINE

## 2021-03-08 PROCEDURE — 99214 OFFICE O/P EST MOD 30 MIN: CPT | Performed by: INTERNAL MEDICINE

## 2021-03-08 PROCEDURE — 3046F HEMOGLOBIN A1C LEVEL >9.0%: CPT | Performed by: INTERNAL MEDICINE

## 2021-03-08 PROCEDURE — G8417 CALC BMI ABV UP PARAM F/U: HCPCS | Performed by: INTERNAL MEDICINE

## 2021-03-08 PROCEDURE — 93000 ELECTROCARDIOGRAM COMPLETE: CPT | Performed by: INTERNAL MEDICINE

## 2021-03-08 NOTE — PROGRESS NOTES
74 y.o. here for preop rectal surgery (for rectal prolapse). No cp. No sob. No n/v/LH/dizziness. No syncope. No LE edema. + DM but not on meds. Lost a lot of wt. +HTN, HL, still smoking. No orthopnea or PND. No formal exercise. Does do some walking and can do block or 2 before resting. Anything more than 1-2 blocks makes sob.     Past Medical History:   Diagnosis Date    Anxiety, generalized     [facility] mellitus [facility]     Diverticulitis     H/O ischemic bowel disease 2016    Hepatitis C     History of kidney stones     x 1 episode    Hyperlipidemia     LBBB (left bundle branch block)     Loose stools     secondary to history of colectomy    Paranoid schizophrenia ([facility] [facility]     Proteinuria     history of    Rectal prolapse     Sciatica     Sleep apnea     USES CPAP     Past Surgical History:   Procedure Laterality Date    ABDOMEN SURGERY  2016    OPEN ILEOSTOMY TAKEDOWN                   COLONOSCOPY      COLPOPEXY N/A 2019    OPEN RECTOPEXY, LYSIS OF ADHESIONS, INTRAOPERATIVE FLEXIBLE SIGMOIDOSCOPY performed by Kusum Beltran MD at [facility] Bilateral 2019    CYSTOSCOPY, BILATERAL URETERAL STENT INSERTION performed by Margaret Gilman MD at [facility]      w/tubaligation    HYSTERECTOMY      PARTIAL VAGINAL    [facility]      KNEE SURGERY      bilateral knee replacements    OTHER SURGICAL HISTORY  3/5/16    DIAGNOSTIC LAPAROSCOPY, SUBTOTAL COLECTOMY, COLOSTOMY,    [place] COLONOSCOPY FLX DX W/COLLJ SPEC WHEN PFRMD N/A 10/25/2018    COLONOSCOPY, POSSIBLE POLYPECTOMY WITH MAC performed by Bro Montes De Oca MD at [address]  [address]       Social History     Tobacco Use    Smoking status: Former Smoker     Packs/day: 0.00     Types: Cigarettes     Start date: 3/22/2001     Quit date: 2016     Years since quittin.0    Smokeless tobacco: Never Used   Substance Use Topics    Alcohol use: No    Drug use: No     No Known Allergies    No family history on file. Review of Systems   General: + night sweats. HEENT: No blurry or decreased vision. No changes in hearing, nasal discharge or sore throat. Cardiovascular: See HPI. No cramping in legs or buttocks when walking. Respiratory: No cough, hemoptysis, or wheezing. No history of asthma. Gastrointestinal: Rectal prolapse. Genito-Urinary: No dysuria or hematuria. No urgency or polyuria. Musculoskeletal: No complaints of joint pain, joint swelling or muscular weakness/soreness. Neurological: No dizziness or headaches. No numbness/tingling, speech problems or weakness. No history of a stroke or TIA. Psychological: Schizophrenia in 2002  Hematological and Lymphatic: No abnormal bleeding or bruising, blood clots, jaundice. Endocrine: No malaise/lethargy, palpitations, polydipsia/polyuria, temperature intolerance or unexpected weight changes. Skin: No rashes or non-healing ulcers. PE:  Blood pressure 138/70, pulse 76, temperature 97.5 °F (36.4 °C), weight 175 lb 8 oz (79.6 kg), not currently breastfeeding. General (appearance): Well devel. No distress. Eyes: Anicteric. EOMI. Neck: supple. No jvd  Ears/Nose/Mouth/Thorat: No cyanosis  CV: RRR. No m/r/g   Respiratory:  Clear b, normal respiratory effort  GI: abd s/nt/nd  Skin: Warm, dry. No rashes  Neuro/Psych: Alert and oriented x 3. Appropriate behavior  Ext:  No c/c.  No edema  Pulses:  1-2+ radial      Lab Results   Component Value Date    WBC 4.9 07/13/2020    HGB 13.1 07/13/2020    HCT 39.1 07/13/2020    .1 (H) 07/13/2020     07/13/2020       Chemistry        Component Value Date/Time     08/12/2020 0829    K 4.9 08/12/2020 0829     08/12/2020 0829    CO2 24 08/12/2020 0829    BUN 24 (H) 08/12/2020 0829    CREATININE 1.1 08/12/2020 0829        Component Value Date/Time    CALCIUM 9.4 08/12/2020 0829    ALKPHOS 48 07/13/2020 1042 AST 22 07/13/2020 1042    ALT 16 07/13/2020 1042    BILITOT <0.2 07/13/2020 1042        Lab Results   Component Value Date    INR 1.14 04/26/2019    INR 1.18 (H) 10/24/2016    INR 1.10 10/23/2016    PROTIME 13.0 04/26/2019    PROTIME 13.5 (H) 10/24/2016    PROTIME 12.6 10/23/2016     Lab Results   Component Value Date    CHOL 118 07/13/2020    CHOL 170 07/03/2019    CHOL 119 06/06/2018     Lab Results   Component Value Date    TRIG 116 07/13/2020    TRIG 135 07/03/2019    TRIG 109 06/06/2018     Lab Results   Component Value Date    HDL 53 07/13/2020    HDL 77 (H) 07/03/2019    HDL 51 06/06/2018     Lab Results   Component Value Date    LDLCALC 42 07/13/2020    LDLCALC 66 07/03/2019    LDLCALC 46 06/06/2018     Lab Results   Component Value Date    LABVLDL 23 07/13/2020    LABVLDL 27 07/03/2019    LABVLDL 22 06/06/2018     No results found for: CHOLHDLRATIO    ECG from 7/13/2020 NSR with LBBB    5/2017 TTE: EF 50-55%. Tr TR with RVSP 32.    9/19/2016 Nuc stress: Normal    9/15/16 ECG: NSR with frequent PAC/ectopic atrial pacemaker, LAD, LBBB      Current Outpatient Medications:     neomycin (MYCIFRADIN) 500 MG tablet, Take two tablets three times the day before surgery.   Take two tablets at 1 pm, two tablets at 2 pm and two tablets at 9 pm., Disp: 6 tablet, Rfl: 0    acetaminophen (TYLENOL) 325 MG tablet, Take 325 mg by mouth as needed, Disp: , Rfl:     hydroCHLOROthiazide (HYDRODIURIL) 12.5 MG tablet, Take 1 tablet by mouth daily, Disp: 90 tablet, Rfl: 3    DULoxetine (CYMBALTA) 20 MG extended release capsule, Take 20 mg by mouth daily, Disp: , Rfl:     loperamide (RA ANTI-DIARRHEAL) 2 MG capsule, Take 1 capsule by mouth 4 times daily as needed for Diarrhea, Disp: 90 capsule, Rfl: 5    Multiple Vitamins-Minerals (MULTIVITAMIN PO), Take by mouth, Disp: , Rfl:     LORazepam (ATIVAN) 0.5 MG tablet, Take 1 tablet by mouth every 12 hours as needed, Disp: 60 tablet, Rfl: 0    lurasidone (LATUDA) 40 MG TABS tablet, Take 1 tablet by mouth daily, Disp: 30 tablet, Rfl: 0    famotidine (PEPCID) 20 MG tablet, Take 1 tablet by mouth 2 times daily, Disp: 60 tablet, Rfl: 3    atorvastatin (LIPITOR) 40 MG tablet, Take 40 mg by mouth daily, Disp: , Rfl:     albuterol sulfate  (90 BASE) MCG/ACT inhaler, Inhale 2 puffs into the lungs every 6 hours as needed for Wheezing, Disp: , Rfl:     diphenhydrAMINE (BENADRYL) 25 MG capsule, Take 25-50 mg by mouth nightly as needed for Sleep , Disp: , Rfl:     metroNIDAZOLE (FLAGYL) 500 MG tablet, Take one tablet by mouth three on the day prior to surgery. Take one tablet at 1 pm, 2 pm and 9 pm. (Patient not taking: Reported on 3/8/2021), Disp: 3 tablet, Rfl: 0    A/P:  74 y.o. woman, cont to smoke, has DM, HTN, and hyperlipidemia. 1. Pre-op exam    2. LBBB (left bundle branch block)    3. Other hyperlipidemia    4. Essential hypertension    5. Smoker    6. Type 2 diabetes mellitus without complication, without long-term current use of insulin (HCC)    7. RICHARDS (dyspnea on exertion)    --after 2 blocks needs to rest      Recs:  -Leisa  -HCTZ, statin  -CMP and lipids  -Had higher K with ace/arb  -F/U 1 year if stress ok    Ashley Ewing.  Moody Rubalcava MD, [facility] [place] [place]

## 2021-03-15 ENCOUNTER — HOSPITAL ENCOUNTER (OUTPATIENT)
Dept: NON INVASIVE DIAGNOSTICS | Age: 66
Discharge: HOME | End: 2021-03-15
Payer: COMMERCIAL

## 2021-03-15 ENCOUNTER — TELEPHONE (OUTPATIENT)
Dept: SURGERY | Age: 66
End: 2021-03-15

## 2021-03-15 DIAGNOSIS — I44.7 LBBB (LEFT BUNDLE BRANCH BLOCK): ICD-10-CM

## 2021-03-15 DIAGNOSIS — I10 ESSENTIAL HYPERTENSION: ICD-10-CM

## 2021-03-15 DIAGNOSIS — E78.49 OTHER HYPERLIPIDEMIA: ICD-10-CM

## 2021-03-15 DIAGNOSIS — F17.200 SMOKER: ICD-10-CM

## 2021-03-15 DIAGNOSIS — R06.09 DOE (DYSPNEA ON EXERTION): ICD-10-CM

## 2021-03-15 DIAGNOSIS — Z01.818 PRE-OP EXAM: ICD-10-CM

## 2021-03-15 DIAGNOSIS — E11.9 TYPE 2 DIABETES MELLITUS WITHOUT COMPLICATION, WITHOUT LONG-TERM CURRENT USE OF INSULIN (HCC): ICD-10-CM

## 2021-03-15 LAB
LV EF: 65 %
LVEF MODALITY: NORMAL

## 2021-03-15 PROCEDURE — A9502 TC99M TETROFOSMIN: HCPCS | Performed by: INTERNAL MEDICINE

## 2021-03-15 PROCEDURE — 93017 CV STRESS TEST TRACING ONLY: CPT

## 2021-03-15 PROCEDURE — 3430000000 HC RX DIAGNOSTIC RADIOPHARMACEUTICAL: Performed by: INTERNAL MEDICINE

## 2021-03-15 PROCEDURE — 6360000002 HC RX W HCPCS: Performed by: INTERNAL MEDICINE

## 2021-03-15 PROCEDURE — 78452 HT MUSCLE IMAGE SPECT MULT: CPT

## 2021-03-15 PROCEDURE — 2580000003 HC RX 258: Performed by: INTERNAL MEDICINE

## 2021-03-15 RX ORDER — SODIUM CHLORIDE 0.9 % (FLUSH) 0.9 %
10 SYRINGE (ML) INJECTION PRN
Status: DISCONTINUED | OUTPATIENT
Start: 2021-03-15 | End: 2021-03-16 | Stop reason: HOSPADM

## 2021-03-15 RX ADMIN — Medication 10 ML: at 09:23

## 2021-03-15 RX ADMIN — TETROFOSMIN 10.4 MILLICURIE: 1.38 INJECTION, POWDER, LYOPHILIZED, FOR SOLUTION INTRAVENOUS at 08:24

## 2021-03-15 RX ADMIN — TETROFOSMIN 33.6 MILLICURIE: 1.38 INJECTION, POWDER, LYOPHILIZED, FOR SOLUTION INTRAVENOUS at 09:22

## 2021-03-15 RX ADMIN — Medication 10 ML: at 08:24

## 2021-03-15 RX ADMIN — REGADENOSON 0.4 MG: 0.08 INJECTION, SOLUTION INTRAVENOUS at 09:22

## 2021-03-15 NOTE — TELEPHONE ENCOUNTER
Keyla with [facility] Dan[facility] Authorization advised that the CPT codes that are listed for surgery are out patient codes but she is schedule as inpatient    Patient will need to start out as out patient and be admitted as inpatient if needed    Please contact [address] [place] [address] at [phone]

## 2021-03-17 ENCOUNTER — NURSE ONLY (OUTPATIENT)
Dept: PRIMARY CARE CLINIC | Age: 66
End: 2021-03-17
Payer: COMMERCIAL

## 2021-03-17 DIAGNOSIS — Z01.818 PREOP EXAMINATION: Primary | ICD-10-CM

## 2021-03-17 PROCEDURE — 99211 OFF/OP EST MAY X REQ PHY/QHP: CPT | Performed by: NURSE PRACTITIONER

## 2021-03-18 LAB — SARS-COV-2: NOT DETECTED

## 2021-03-18 RX ORDER — ZOLPIDEM TARTRATE 10 MG/1
10 TABLET ORAL NIGHTLY
COMMUNITY
Start: 2021-03-09

## 2021-03-18 NOTE — PROGRESS NOTES
[facility] patients having surgery or anesthesia are required to be Covid tested. You will need to quarantine from the time you are tested until your surgery. PRIOR TO PROCEDURE DATE:        1. PLEASE FOLLOW ANY  GUIDELINES/ INSTRUCTIONS PRIOR TO YOUR PROCEDURE AS ADVISED BY YOUR SURGEON. 2. Arrange for someone to drive you home and be with you for the first 24 hours after discharge for your safety after your procedure for which you received sedation. Ensure it is someone we can share information with regarding your discharge. 3. You must contact your surgeon for instructions IF:   You are taking any blood thinners, aspirin, anti-inflammatory or vitamin E.   There is a change in your physical condition such as a cold, fever, rash, cuts, sores or any other infection, especially near your surgical site. 4. Do not drink alcohol the day before or day of your procedure. 5. A Pre-op History and Physical for surgery MUST be completed by your Physician or Urgent Care within 30 days of your procedure date. Please bring a copy with you on the day of your procedure and along with any other testing performed. THE DAY OF YOUR PROCEDURE:  1. Follow instructions for ARRIVAL TIME as DIRECTED BY YOUR SURGEON. I    1. Enter the MAIN entrance from [employer] and follow the signs to the free [employer] or [employer] parking (offered free of charge 6am-5pm). 2. Enter the Main Entrance of the hospital (do not enter from the lower level of the parking garage). Upon entrance, check in with the  at the main desk on your left. If no one is available at the desk, proceed into the [facility] Waiting Room and go through the door directly into the [facility]. There is a Check-in desk ACROSS from Room 5 (marked with a sign hanging from the ceiling). The phone number for the surgery center is [phone]. want to bring a method of payment, i.e. Check or credit card, if you wish to pay your co-pay the day of surgery. 12. If you are to stay overnight, you may bring a bag with personal items. Please have any large items you may need brought in by your family after your arrival to your hospital room. 15. If you have a Living Will or Durable Power of , please bring a copy on the day of your procedure. 15. With your permission, one family member may accompany you while you are being prepared for surgery. Once you are ready, additional family members may join you. HOW WE KEEP YOU SAFE and WORK TO PREVENT SURGICAL SITE INFECTIONS:  1. Health care workers should always check your ID bracelet to verify your name and birth date. You will be asked many times to state your name, date of birth, and allergies. 2. Health care workers should always clean their hands with soap or alcohol gel before providing care to you. It is okay to ask anyone if they cleaned their hands before they touch you. 3. You will be actively involved in verifying the type of procedure you are having and ensuring the correct surgical site. This will be confirmed multiple times prior to your procedure. Do NOT haydee your surgery site UNLESS instructed to by your surgeon. 4. Do not shave or wax for 72 hours prior to procedure near your operative site. Shaving with a razor can irritate your skin and make it easier to develop an infection. On the day of your procedure, any hair that needs to be removed near the surgical site will be clipped by a healthcare worker using a special clippers designed to avoid skin irritation. 5. When you are in the operating room, your surgical site will be cleansed with a special soap, and in most cases, you will be given an antibiotic before the surgery begins. What to expect AFTER YOUR PROCEDURE:  1.  Immediately following your procedure, your will be taken to the PACU for the first phase of your recovery. Your nurse will help you recover from any potential side effects of anesthesia, such as extreme drowsiness, changes in your vital signs or breathing patterns. Nausea, headache, muscle aches, or sore throat may also occur after anesthesia. Your nurse will help you manage these potential side effects. 2. For comfort and safety, arrange to have someone at home with you for the first 24 hours after discharge. 3. You and your family will be given written instructions about your diet, activity, dressing care, medications, and return visits. 4. Once at home, should issues with nausea, pain, or bleeding occur, or should you notice any signs of infection, you should call your surgeon. 5. Always clean your hands before and after caring for your wound. Do not let your family touch your surgery site without cleaning their hands. 6. Narcotic pain medications can cause significant constipation. You may want to add a stool softener to your postoperative medication schedule or speak to your surgeon on how best to manage this SIDE EFFECT. SPECIAL INSTRUCTIONS     Thank you for allowing us to care for you. We strive to exceed your expectations in the delivery of care and service provided to you and your family. If you need to contact the [facility] staff for any reason, please call us at [phone]    Instructions reviewed with patient during preadmission testing phone interview. Sarah Atkins. 3/18/2021 .9:43 AM      ADDITIONAL EDUCATIONAL INFORMATION REVIEWED PER PHONE WITH YOU AND/OR YOUR FAMILY:    Yes Antibacterial Soap

## 2021-03-19 NOTE — PROGRESS NOTES
[facility] / [facility] ([facility]) [address], [address] [place] [address]    Acknowledgment of Informed Consent for Surgical or Medical Procedure and Sedation  I agree to allow doctor(s) Brenda Ballesteros  and his/her associates or assistants, including residents and/or other qualified medical practitioner to perform the following medical treatment or procedure and to administer or direct the administration of sedation as necessary:  Procedure(s): EXAM UNDER ANESTHESIA, Donald Arambula      My doctor has explained the following regarding the proposed procedure:   the explanation of the procedure   the benefits of the procedure   the potential problems that might occur during recuperation   the risks and side effects of the procedure which could include but are not limited to severe blood loss, infection, stroke or death   the benefits, risks and side effect of alternative procedures including the consequences of declining this procedure or any alternative procedures   the likelihood of achieving satisfactory results. I acknowledge no guarantee or assurance has been made to me regarding the results. I understand that during the course of this treatment/procedure, unforeseen conditions can occur which require an additional or different procedure. I agree to allow my physician or assistants to perform such extension of the original procedure as they may find necessary. I understand that sedation will often result in temporary impairment of memory and fine motor skills and that sedation can occasionally progress to a state of deep sedation or general anesthesia. I understand the risks of anesthesia for surgery include, but are not limited to, sore throat, hoarseness, injury to face, mouth, or teeth; nausea; headache; injury to blood vessels or nerves; death, brain damage, or paralysis.     I understand that if I have a Limitation of Treatment order in effect during my hospitalization, the order may or may not be in effect during this procedure. I give my doctor permission to give me blood or blood products. I understand that there are risks with receiving blood such as hepatitis, AIDS, fever, or allergic reaction. I acknowledge that the risks, benefits, and alternatives of this treatment have been explained to me and that no express or implied warranty has been given by the hospital, any blood bank, or any person or entity as to the blood or blood components transfused. At the discretion of my doctor, I agree to allow observers, equipment/product representatives and allow photographing, and/or televising of the procedure, provided my name or identity is maintained confidentially. I agree the hospital may dispose of or use for scientific or educational purposes any tissue, fluid, or body parts which may be removed.     ________________________________Date________Time______ am/pm  ([place] [facility])  Patient or Signature of Closest Relative or Legal Guardian    ________________________________Date________Time______am/pm      Page 1 of  1  Witness

## 2021-03-22 ENCOUNTER — ANESTHESIA EVENT (OUTPATIENT)
Dept: OPERATING ROOM | Age: 66
DRG: 331 | End: 2021-03-22
Payer: COMMERCIAL

## 2021-03-22 ENCOUNTER — TELEPHONE (OUTPATIENT)
Dept: SURGERY | Age: 66
End: 2021-03-22

## 2021-03-22 NOTE — TELEPHONE ENCOUNTER
Spoke with patient confirming arrival time at [facility]. (5:30). Reminded patient of no blood thinners, antibiotics, bowel prep. Patient expressed understanding of all instructions.

## 2021-03-23 ENCOUNTER — ANESTHESIA (OUTPATIENT)
Dept: OPERATING ROOM | Age: 66
DRG: 331 | End: 2021-03-23
Payer: COMMERCIAL

## 2021-03-23 ENCOUNTER — HOSPITAL ENCOUNTER (INPATIENT)
Age: 66
LOS: 2 days | Discharge: HOME | DRG: 331 | End: 2021-03-25
Attending: SURGERY | Admitting: SURGERY
Payer: COMMERCIAL

## 2021-03-23 VITALS — DIASTOLIC BLOOD PRESSURE: 75 MMHG | OXYGEN SATURATION: 88 % | SYSTOLIC BLOOD PRESSURE: 146 MMHG | TEMPERATURE: 96.4 F

## 2021-03-23 DIAGNOSIS — K62.3 RECTAL PROLAPSE: ICD-10-CM

## 2021-03-23 DIAGNOSIS — G89.18 POST-OPERATIVE PAIN: Primary | ICD-10-CM

## 2021-03-23 LAB
ABO/RH: NORMAL
ANTIBODY SCREEN: NORMAL
APTT: 34.1 SEC (ref 24.2–36.2)
GLUCOSE BLD-MCNC: 171 MG/DL (ref 70–99)
GLUCOSE BLD-MCNC: 173 MG/DL (ref 70–99)
HCT VFR BLD CALC: 40.7 % (ref 36–48)
HEMOGLOBIN: 13.9 G/DL (ref 12–16)
INR BLD: 1.11 (ref 0.86–1.14)
MCH RBC QN AUTO: 33.1 PG (ref 26–34)
MCHC RBC AUTO-ENTMCNC: 34.2 G/DL (ref 31–36)
MCV RBC AUTO: 96.9 FL (ref 80–100)
PDW BLD-RTO: 12.8 % (ref 12.4–15.4)
PERFORMED ON: ABNORMAL
PERFORMED ON: ABNORMAL
PLATELET # BLD: 229 K/UL (ref 135–450)
PMV BLD AUTO: 7.2 FL (ref 5–10.5)
PROTHROMBIN TIME: 12.9 SEC (ref 10–13.2)
RBC # BLD: 4.2 M/UL (ref 4–5.2)
WBC # BLD: 5.2 K/UL (ref 4–11)

## 2021-03-23 PROCEDURE — 6360000002 HC RX W HCPCS: Performed by: SURGERY

## 2021-03-23 PROCEDURE — 2580000003 HC RX 258: Performed by: SURGERY

## 2021-03-23 PROCEDURE — 0DQP7ZZ REPAIR RECTUM, VIA NATURAL OR ARTIFICIAL OPENING: ICD-10-PCS | Performed by: SURGERY

## 2021-03-23 PROCEDURE — 6370000000 HC RX 637 (ALT 250 FOR IP): Performed by: SURGERY

## 2021-03-23 PROCEDURE — 85730 THROMBOPLASTIN TIME PARTIAL: CPT

## 2021-03-23 PROCEDURE — 85027 COMPLETE CBC AUTOMATED: CPT

## 2021-03-23 PROCEDURE — 94660 CPAP INITIATION&MGMT: CPT

## 2021-03-23 PROCEDURE — 51701 INSERT BLADDER CATHETER: CPT

## 2021-03-23 PROCEDURE — 86900 BLOOD TYPING SEROLOGIC ABO: CPT

## 2021-03-23 PROCEDURE — 2580000003 HC RX 258: Performed by: ANESTHESIOLOGY

## 2021-03-23 PROCEDURE — 2500000003 HC RX 250 WO HCPCS: Performed by: SURGERY

## 2021-03-23 PROCEDURE — 3600000004 HC SURGERY LEVEL 4 BASE: Performed by: SURGERY

## 2021-03-23 PROCEDURE — 45505 REPAIR OF RECTUM: CPT | Performed by: SURGERY

## 2021-03-23 PROCEDURE — 3700000001 HC ADD 15 MINUTES (ANESTHESIA): Performed by: SURGERY

## 2021-03-23 PROCEDURE — 86850 RBC ANTIBODY SCREEN: CPT

## 2021-03-23 PROCEDURE — C9290 INJ, BUPIVACAINE LIPOSOME: HCPCS | Performed by: SURGERY

## 2021-03-23 PROCEDURE — 51798 US URINE CAPACITY MEASURE: CPT

## 2021-03-23 PROCEDURE — 5A09357 ASSISTANCE WITH RESPIRATORY VENTILATION, LESS THAN 24 CONSECUTIVE HOURS, CONTINUOUS POSITIVE AIRWAY PRESSURE: ICD-10-PCS | Performed by: SURGERY

## 2021-03-23 PROCEDURE — 85610 PROTHROMBIN TIME: CPT

## 2021-03-23 PROCEDURE — 86901 BLOOD TYPING SEROLOGIC RH(D): CPT

## 2021-03-23 PROCEDURE — 1200000000 HC SEMI PRIVATE

## 2021-03-23 PROCEDURE — 3700000000 HC ANESTHESIA ATTENDED CARE: Performed by: SURGERY

## 2021-03-23 PROCEDURE — 6360000002 HC RX W HCPCS: Performed by: NURSE ANESTHETIST, CERTIFIED REGISTERED

## 2021-03-23 PROCEDURE — 7100000001 HC PACU RECOVERY - ADDTL 15 MIN: Performed by: SURGERY

## 2021-03-23 PROCEDURE — 2709999900 HC NON-CHARGEABLE SUPPLY: Performed by: SURGERY

## 2021-03-23 PROCEDURE — 7100000000 HC PACU RECOVERY - FIRST 15 MIN: Performed by: SURGERY

## 2021-03-23 PROCEDURE — 88305 TISSUE EXAM BY PATHOLOGIST: CPT

## 2021-03-23 PROCEDURE — 3600000014 HC SURGERY LEVEL 4 ADDTL 15MIN: Performed by: SURGERY

## 2021-03-23 RX ORDER — DULOXETIN HYDROCHLORIDE 20 MG/1
20 CAPSULE, DELAYED RELEASE ORAL NIGHTLY
Status: DISCONTINUED | OUTPATIENT
Start: 2021-03-23 | End: 2021-03-25 | Stop reason: HOSPADM

## 2021-03-23 RX ORDER — LORAZEPAM 0.5 MG/1
0.5 TABLET ORAL EVERY 12 HOURS PRN
Status: DISCONTINUED | OUTPATIENT
Start: 2021-03-23 | End: 2021-03-25 | Stop reason: HOSPADM

## 2021-03-23 RX ORDER — SODIUM CHLORIDE, SODIUM LACTATE, POTASSIUM CHLORIDE, CALCIUM CHLORIDE 600; 310; 30; 20 MG/100ML; MG/100ML; MG/100ML; MG/100ML
INJECTION, SOLUTION INTRAVENOUS CONTINUOUS
Status: DISCONTINUED | OUTPATIENT
Start: 2021-03-23 | End: 2021-03-23

## 2021-03-23 RX ORDER — ENALAPRILAT 2.5 MG/2ML
1.25 INJECTION INTRAVENOUS
Status: DISCONTINUED | OUTPATIENT
Start: 2021-03-23 | End: 2021-03-23 | Stop reason: HOSPADM

## 2021-03-23 RX ORDER — LIDOCAINE HYDROCHLORIDE 20 MG/ML
INJECTION, SOLUTION INTRAVENOUS PRN
Status: DISCONTINUED | OUTPATIENT
Start: 2021-03-23 | End: 2021-03-23 | Stop reason: SDUPTHER

## 2021-03-23 RX ORDER — SODIUM CHLORIDE 0.9 % (FLUSH) 0.9 %
10 SYRINGE (ML) INJECTION PRN
Status: DISCONTINUED | OUTPATIENT
Start: 2021-03-23 | End: 2021-03-23 | Stop reason: HOSPADM

## 2021-03-23 RX ORDER — HYDRALAZINE HYDROCHLORIDE 20 MG/ML
5 INJECTION INTRAMUSCULAR; INTRAVENOUS EVERY 5 MIN PRN
Status: DISCONTINUED | OUTPATIENT
Start: 2021-03-23 | End: 2021-03-23 | Stop reason: HOSPADM

## 2021-03-23 RX ORDER — FENTANYL CITRATE 50 UG/ML
25 INJECTION, SOLUTION INTRAMUSCULAR; INTRAVENOUS EVERY 5 MIN PRN
Status: DISCONTINUED | OUTPATIENT
Start: 2021-03-23 | End: 2021-03-23 | Stop reason: HOSPADM

## 2021-03-23 RX ORDER — ATORVASTATIN CALCIUM 40 MG/1
40 TABLET, FILM COATED ORAL NIGHTLY
Status: DISCONTINUED | OUTPATIENT
Start: 2021-03-23 | End: 2021-03-25 | Stop reason: HOSPADM

## 2021-03-23 RX ORDER — ALBUTEROL SULFATE 2.5 MG/3ML
2.5 SOLUTION RESPIRATORY (INHALATION) EVERY 4 HOURS PRN
Status: DISCONTINUED | OUTPATIENT
Start: 2021-03-23 | End: 2021-03-25 | Stop reason: HOSPADM

## 2021-03-23 RX ORDER — SODIUM CHLORIDE 0.9 % (FLUSH) 0.9 %
10 SYRINGE (ML) INJECTION EVERY 12 HOURS SCHEDULED
Status: DISCONTINUED | OUTPATIENT
Start: 2021-03-23 | End: 2021-03-23 | Stop reason: HOSPADM

## 2021-03-23 RX ORDER — ONDANSETRON 2 MG/ML
4 INJECTION INTRAMUSCULAR; INTRAVENOUS EVERY 6 HOURS PRN
Status: DISCONTINUED | OUTPATIENT
Start: 2021-03-23 | End: 2021-03-25 | Stop reason: HOSPADM

## 2021-03-23 RX ORDER — ZOLPIDEM TARTRATE 5 MG/1
5 TABLET ORAL NIGHTLY PRN
Status: DISCONTINUED | OUTPATIENT
Start: 2021-03-23 | End: 2021-03-25 | Stop reason: HOSPADM

## 2021-03-23 RX ORDER — FENTANYL CITRATE 50 UG/ML
INJECTION, SOLUTION INTRAMUSCULAR; INTRAVENOUS PRN
Status: DISCONTINUED | OUTPATIENT
Start: 2021-03-23 | End: 2021-03-23 | Stop reason: SDUPTHER

## 2021-03-23 RX ORDER — DIPHENHYDRAMINE HCL 25 MG
25 TABLET ORAL NIGHTLY PRN
Status: DISCONTINUED | OUTPATIENT
Start: 2021-03-23 | End: 2021-03-25 | Stop reason: HOSPADM

## 2021-03-23 RX ORDER — OXYCODONE HYDROCHLORIDE 5 MG/1
5 TABLET ORAL EVERY 4 HOURS PRN
Status: DISCONTINUED | OUTPATIENT
Start: 2021-03-23 | End: 2021-03-25 | Stop reason: HOSPADM

## 2021-03-23 RX ORDER — ONDANSETRON 2 MG/ML
4 INJECTION INTRAMUSCULAR; INTRAVENOUS
Status: DISCONTINUED | OUTPATIENT
Start: 2021-03-23 | End: 2021-03-23 | Stop reason: HOSPADM

## 2021-03-23 RX ORDER — SODIUM CHLORIDE 0.9 % (FLUSH) 0.9 %
10 SYRINGE (ML) INJECTION PRN
Status: DISCONTINUED | OUTPATIENT
Start: 2021-03-23 | End: 2021-03-25 | Stop reason: HOSPADM

## 2021-03-23 RX ORDER — LABETALOL HYDROCHLORIDE 5 MG/ML
5 INJECTION, SOLUTION INTRAVENOUS EVERY 10 MIN PRN
Status: DISCONTINUED | OUTPATIENT
Start: 2021-03-23 | End: 2021-03-23 | Stop reason: HOSPADM

## 2021-03-23 RX ORDER — DOCUSATE SODIUM 100 MG/1
100 CAPSULE, LIQUID FILLED ORAL 2 TIMES DAILY
Status: DISCONTINUED | OUTPATIENT
Start: 2021-03-23 | End: 2021-03-24

## 2021-03-23 RX ORDER — SODIUM CHLORIDE 0.9 % (FLUSH) 0.9 %
10 SYRINGE (ML) INJECTION EVERY 12 HOURS SCHEDULED
Status: DISCONTINUED | OUTPATIENT
Start: 2021-03-23 | End: 2021-03-25 | Stop reason: HOSPADM

## 2021-03-23 RX ORDER — OXYCODONE HYDROCHLORIDE 5 MG/1
10 TABLET ORAL EVERY 4 HOURS PRN
Status: DISCONTINUED | OUTPATIENT
Start: 2021-03-23 | End: 2021-03-25 | Stop reason: HOSPADM

## 2021-03-23 RX ORDER — POLYETHYLENE GLYCOL 3350 17 G/17G
17 POWDER, FOR SOLUTION ORAL DAILY
COMMUNITY

## 2021-03-23 RX ORDER — ACETAMINOPHEN 500 MG
1000 TABLET ORAL EVERY 6 HOURS
Status: DISCONTINUED | OUTPATIENT
Start: 2021-03-23 | End: 2021-03-25 | Stop reason: HOSPADM

## 2021-03-23 RX ORDER — FAMOTIDINE 20 MG/1
20 TABLET, FILM COATED ORAL 2 TIMES DAILY
Status: DISCONTINUED | OUTPATIENT
Start: 2021-03-23 | End: 2021-03-24

## 2021-03-23 RX ORDER — PROPOFOL 10 MG/ML
INJECTION, EMULSION INTRAVENOUS PRN
Status: DISCONTINUED | OUTPATIENT
Start: 2021-03-23 | End: 2021-03-23 | Stop reason: SDUPTHER

## 2021-03-23 RX ORDER — FENTANYL CITRATE 50 UG/ML
50 INJECTION, SOLUTION INTRAMUSCULAR; INTRAVENOUS EVERY 5 MIN PRN
Status: DISCONTINUED | OUTPATIENT
Start: 2021-03-23 | End: 2021-03-23 | Stop reason: HOSPADM

## 2021-03-23 RX ORDER — ONDANSETRON 4 MG/1
4 TABLET, ORALLY DISINTEGRATING ORAL EVERY 8 HOURS PRN
Status: DISCONTINUED | OUTPATIENT
Start: 2021-03-23 | End: 2021-03-25 | Stop reason: HOSPADM

## 2021-03-23 RX ORDER — POLYETHYLENE GLYCOL 3350 17 G/17G
17 POWDER, FOR SOLUTION ORAL DAILY
Status: DISCONTINUED | OUTPATIENT
Start: 2021-03-23 | End: 2021-03-24

## 2021-03-23 RX ORDER — BUPIVACAINE HYDROCHLORIDE AND EPINEPHRINE 5; 5 MG/ML; UG/ML
INJECTION, SOLUTION EPIDURAL; INTRACAUDAL; PERINEURAL PRN
Status: DISCONTINUED | OUTPATIENT
Start: 2021-03-23 | End: 2021-03-23 | Stop reason: HOSPADM

## 2021-03-23 RX ORDER — MAGNESIUM HYDROXIDE 1200 MG/15ML
LIQUID ORAL CONTINUOUS PRN
Status: COMPLETED | OUTPATIENT
Start: 2021-03-23 | End: 2021-03-23

## 2021-03-23 RX ORDER — LIDOCAINE HYDROCHLORIDE 10 MG/ML
1 INJECTION, SOLUTION EPIDURAL; INFILTRATION; INTRACAUDAL; PERINEURAL
Status: DISCONTINUED | OUTPATIENT
Start: 2021-03-23 | End: 2021-03-23 | Stop reason: HOSPADM

## 2021-03-23 RX ADMIN — FENTANYL CITRATE 25 MCG: 50 INJECTION, SOLUTION INTRAMUSCULAR; INTRAVENOUS at 08:48

## 2021-03-23 RX ADMIN — CEFOXITIN SODIUM 2000 MG: 2 POWDER, FOR SOLUTION INTRAVENOUS at 07:43

## 2021-03-23 RX ADMIN — DULOXETINE HYDROCHLORIDE 20 MG: 20 CAPSULE, DELAYED RELEASE ORAL at 20:55

## 2021-03-23 RX ADMIN — FENTANYL CITRATE 50 MCG: 50 INJECTION, SOLUTION INTRAMUSCULAR; INTRAVENOUS at 07:35

## 2021-03-23 RX ADMIN — Medication 10 ML: at 20:55

## 2021-03-23 RX ADMIN — LURASIDONE HYDROCHLORIDE 40 MG: 40 TABLET, FILM COATED ORAL at 17:16

## 2021-03-23 RX ADMIN — ZOLPIDEM TARTRATE 5 MG: 5 TABLET ORAL at 20:55

## 2021-03-23 RX ADMIN — DOCUSATE SODIUM 100 MG: 100 CAPSULE ORAL at 20:55

## 2021-03-23 RX ADMIN — ACETAMINOPHEN 1000 MG: 500 TABLET, FILM COATED ORAL at 12:48

## 2021-03-23 RX ADMIN — PROPOFOL 200 MG: 10 INJECTION, EMULSION INTRAVENOUS at 07:35

## 2021-03-23 RX ADMIN — FENTANYL CITRATE 100 MCG: 50 INJECTION, SOLUTION INTRAMUSCULAR; INTRAVENOUS at 08:00

## 2021-03-23 RX ADMIN — SODIUM CHLORIDE, POTASSIUM CHLORIDE, SODIUM LACTATE AND CALCIUM CHLORIDE: 600; 310; 30; 20 INJECTION, SOLUTION INTRAVENOUS at 07:31

## 2021-03-23 RX ADMIN — OXYCODONE 10 MG: 5 TABLET ORAL at 17:15

## 2021-03-23 RX ADMIN — OXYCODONE 10 MG: 5 TABLET ORAL at 21:27

## 2021-03-23 RX ADMIN — FAMOTIDINE 20 MG: 20 TABLET ORAL at 20:55

## 2021-03-23 RX ADMIN — ACETAMINOPHEN 1000 MG: 500 TABLET, FILM COATED ORAL at 20:55

## 2021-03-23 RX ADMIN — FENTANYL CITRATE 25 MCG: 50 INJECTION, SOLUTION INTRAMUSCULAR; INTRAVENOUS at 08:46

## 2021-03-23 RX ADMIN — LIDOCAINE HYDROCHLORIDE 60 MG: 20 INJECTION, SOLUTION INTRAVENOUS at 07:35

## 2021-03-23 RX ADMIN — SODIUM CHLORIDE, POTASSIUM CHLORIDE, SODIUM LACTATE AND CALCIUM CHLORIDE: 600; 310; 30; 20 INJECTION, SOLUTION INTRAVENOUS at 06:16

## 2021-03-23 RX ADMIN — DOCUSATE SODIUM 100 MG: 100 CAPSULE ORAL at 15:00

## 2021-03-23 ASSESSMENT — PAIN SCALES - GENERAL
PAINLEVEL_OUTOF10: 0
PAINLEVEL_OUTOF10: 10
PAINLEVEL_OUTOF10: 3
PAINLEVEL_OUTOF10: 9
PAINLEVEL_OUTOF10: 2
PAINLEVEL_OUTOF10: 10
PAINLEVEL_OUTOF10: 0

## 2021-03-23 ASSESSMENT — PAIN DESCRIPTION - ONSET: ONSET: ON-GOING

## 2021-03-23 ASSESSMENT — PULMONARY FUNCTION TESTS
PIF_VALUE: 0
PIF_VALUE: 8
PIF_VALUE: 10
PIF_VALUE: 9
PIF_VALUE: 8
PIF_VALUE: 8
PIF_VALUE: 20
PIF_VALUE: 8
PIF_VALUE: 9
PIF_VALUE: 8
PIF_VALUE: 9
PIF_VALUE: 8
PIF_VALUE: 8
PIF_VALUE: 9
PIF_VALUE: 8
PIF_VALUE: 10
PIF_VALUE: 8
PIF_VALUE: 9
PIF_VALUE: 1
PIF_VALUE: 8
PIF_VALUE: 1
PIF_VALUE: 9
PIF_VALUE: 8
PIF_VALUE: 9
PIF_VALUE: 8
PIF_VALUE: 10
PIF_VALUE: 8
PIF_VALUE: 9
PIF_VALUE: 8
PIF_VALUE: 1
PIF_VALUE: 11
PIF_VALUE: 8
PIF_VALUE: 9
PIF_VALUE: 8
PIF_VALUE: 9

## 2021-03-23 ASSESSMENT — PAIN DESCRIPTION - ORIENTATION
ORIENTATION: LOWER
ORIENTATION: LOWER

## 2021-03-23 ASSESSMENT — PAIN DESCRIPTION - DESCRIPTORS
DESCRIPTORS: PRESSURE
DESCRIPTORS: PRESSURE

## 2021-03-23 ASSESSMENT — PAIN DESCRIPTION - LOCATION
LOCATION: ABDOMEN;FLANK
LOCATION: ABDOMEN
LOCATION: ABDOMEN

## 2021-03-23 ASSESSMENT — PAIN DESCRIPTION - PAIN TYPE
TYPE: SURGICAL PAIN
TYPE: SURGICAL PAIN

## 2021-03-23 ASSESSMENT — PAIN - FUNCTIONAL ASSESSMENT: PAIN_FUNCTIONAL_ASSESSMENT: 0-10

## 2021-03-23 ASSESSMENT — PAIN DESCRIPTION - FREQUENCY: FREQUENCY: CONTINUOUS

## 2021-03-23 NOTE — PROGRESS NOTES
Pt up and down to restroom freqtly. States she has pressure in front near bladder. Pain \"like a kidney stone. \" Given Oxycodone for 9/10 pain.

## 2021-03-23 NOTE — PROGRESS NOTES
Arrived to [facility]308 VSS, A+Ox4, denies pain/nausea. Skin assessment completed by this RN (no evidence of breakdown). Voided twice. Sx site CDI with some drainage (serosang). SBA w/ steady gait. CPAP. Ice h20, and IS at bedside. SCDs on. Call light in reach. Bengay applied to pt's back. Bed alarm on.  Will cont to monitor

## 2021-03-23 NOTE — PROGRESS NOTES
RESPIRATORY THERAPY ASSESSMENT    Name:  Niranjan Barrios Record Number:  [id number]  Age: 74 y.o. Gender: female  : 1955  Today's Date:  3/23/2021  Room:  [facility]-    Assessment     Is the patient being admitted for a COPD or Asthma exacerbation? No   (If yes the patient will be seen every 4 hours for the first 24 hours and then reassessed)    Patient Admission Diagnosis      Allergies  No Known Allergies          Pulmonary History: current smoker  Home Oxygen Therapy:  room air  Home Respiratory Therapy:Albuterol prn  Current Respiratory Therapy:  Albuterol prn          Respiratory Severity Index(RSI)   Patients with orders for inhalation medications, oxygen, or any therapeutic treatment modality will be placed on Respiratory Protocol. They will be assessed with the first treatment and at least every 72 hours thereafter. The following severity scale will be used to determine frequency of treatment intervention.     Smoking History: Pulmonary Disease or Smoking History, Greater than 15 pack year = 2    Social History  Social History     Tobacco Use    Smoking status: Current Every Day Smoker     Packs/day: 0.50     Years: 20.00     Pack years: 10.00     Types: Cigarettes     Start date: 3/22/2001     Last attempt to quit: 2016     Years since quittin.0    Smokeless tobacco: Never Used   Substance Use Topics    Alcohol use: No    Drug use: No       Recent Surgical History: None = 0  Past Surgical History  Past Surgical History:   Procedure Laterality Date    ABDOMEN SURGERY  2016    OPEN ILEOSTOMY TAKEDOWN                   COLONOSCOPY      COLPOPEXY N/A 2019    OPEN RECTOPEXY, LYSIS OF ADHESIONS, INTRAOPERATIVE FLEXIBLE SIGMOIDOSCOPY performed by Faustina Borja MD at [place] [address] Bilateral 2019    CYSTOSCOPY, BILATERAL URETERAL STENT INSERTION performed by Lavone Jeans, MD at [address] [place] [address]      w/tubaligation    HYSTERECTOMY      PARTIAL VAGINAL    KIDNEY STONE SURGERY      KIDNEY STONE SURGERY      KNEE SURGERY      bilateral knee replacements    OTHER SURGICAL HISTORY  3/5/16    DIAGNOSTIC LAPAROSCOPY, SUBTOTAL COLECTOMY, COLOSTOMY,    AK COLONOSCOPY FLX DX W/COLLJ SPEC WHEN PFRMD N/A 10/25/2018    COLONOSCOPY, POSSIBLE POLYPECTOMY WITH MAC performed by Raji Ball MD at [address] [place] [address] N/A 3/23/2021    EXAM UNDER ANESTHESIA, DELORME PROCEDURE performed by Raji Ball MD at [address] EXTRACTION         Level of Consciousness: Alert, Oriented, and Cooperative = 0    Level of Activity: Walking unassisted = 0    Respiratory Pattern: Regular Pattern; RR 8-20 = 0    Breath Sounds: Clear = 0    Sputum   ,  ,    Cough: Strong, spontaneous, non-productive = 0    Vital Signs   /86   Pulse 71   Temp 97.3 °F (36.3 °C) (Oral)   Resp 19   Ht 4' 11\" (1.499 m)   Wt 135 lb (61.2 kg)   SpO2 98%   BMI 27.27 kg/m²   SPO2 (COPD values may differ): Greater than or equal to 92% on room air = 0    Peak Flow (asthma only): not applicable = 0    RSI: 0-4 = See once and convert to home regimen or discontinue        Plan       Goals: medication delivery, mobilize retained secretions, volume expansion and improve oxygenation      Level of patient/caregiver understanding able to:   ? Verbalize understanding   ? Demonstrate understanding       ? Teach back        ? Needs reinforcement       ? No available caregiver               ? Other:          Is patient being placed on Home Treatment Regimen? Yes     Does the patient have everything they need prior to discharge? NA     Comments: chart reviewed    Plan of Care: continue current therapy    Electronically signed by Giancarlo Cedillo RCP on 3/23/2021 at 3:44 PM    Respiratory Protocol Guidelines     1.  Assessment and treatment by Respiratory Therapy will be initiated for medication and therapeutic interventions upon initiation of aerosolized medication. 2. Physician will be contacted for respiratory rate (RR) greater than 35 breaths per minute. Therapy will be held for heart rate (HR) greater than 140 beats per minute, pending direction from physician. 3. Bronchodilators will be administered via Metered Dose Inhaler (MDI) with spacer when the following criteria are met:  a. Alert and cooperative     b. HR < 140 bpm  c. RR < 30 bpm                d. Can demonstrate a 23 second inspiratory hold  4. Bronchodilators will be administered via Hand Held Nebulizer [facility]) to patients when ANY of the following criteria are met  a. Incognizant or uncooperative          b. Patients treated with HHN at Home        c. Unable to demonstrate proper use of MDI with spacer     d. RR > 30 bpm   5. Bronchodilators will be delivered via Metered Dose Inhaler (MDI), HHN, Aerogen to intubated patients on mechanical ventilation. 6. Inhalation medication orders will be delivered and/or substituted as outlined below. Aerosolized Medications Ordering and Administration Guidelines:    1. All Medications will be ordered by a physician, and their frequency and/or modality will be adjusted as defined by the patients Respiratory Severity Index (RSI) score. 2. If the patient does not have documented COPD, consider discontinuing anticholinergics when RSI is less than 9.  3. If the bronchospasm worsens (increased RSI), then the bronchodilator frequency can be increased to a maximum of every 4 hours. If greater than every 4 hours is required, the physician will be contacted. 4. If the bronchospasm improves, the frequency of the bronchodilator can be decreased, based on the patient's RSI, but not less than home treatment regimen frequency. 5. Bronchodilator(s) will be discontinued if patient has a RSI less than 9 and has received no scheduled or as needed treatment for 72  Hrs. Patients Ordered on a Mucolytic Agent:    1.  Must always be administered with a bronchodilator. 2. Discontinue if patient experiences worsened bronchospasm, or secretions have lessened to the point that the patient is able to clear them with a cough. Anti-inflammatory and Combination Medications:    1. If the patient lacks prior history of lung disease, is not using inhaled anti-inflammatory medication at home, and lacks wheezing by examination or by history for at least 24 hours, contact physician for possible discontinuation.

## 2021-03-23 NOTE — ANESTHESIA PRE PROCEDURE
Department of Anesthesiology  Preprocedure Note       Name:  Alyson Lake   Age:  74 y.o.  :  1955                                          MRN:  [id number]         Date:  3/23/2021      Surgeon: Juan Ayala):  Little Moses MD    Procedure: Procedure(s):  EXAM UNDER ANESTHESIA, DELORME PROCEDURE VERSUS ALTEMEIER OPERATION FOR RECTAL PROLAPSE    Medications prior to admission:   Prior to Admission medications    Medication Sig Start Date End Date Taking? Authorizing Provider   polyethylene glycol (MIRALAX) 17 g PACK packet Take 17 g by mouth daily   Yes Historical Provider, MD   zolpidem (AMBIEN) 10 MG tablet  3/9/21  Yes Historical Provider, MD   neomycin (MYCIFRADIN) 500 MG tablet Take two tablets three times the day before surgery. Take two tablets at 1 pm, two tablets at 2 pm and two tablets at 9 pm. 3/4/21  Yes Little Moses MD   metroNIDAZOLE (FLAGYL) 500 MG tablet Take one tablet by mouth three on the day prior to surgery. Take one tablet at 1 pm, 2 pm and 9 pm. 3/4/21  Yes Little Moses MD   hydroCHLOROthiazide (HYDRODIURIL) 12.5 MG tablet Take 1 tablet by mouth daily 10/9/20  Yes JEVON Jeffery - CNP   DULoxetine (CYMBALTA) 20 MG extended release capsule Take 20 mg by mouth daily   Yes Historical Provider, MD   Multiple Vitamins-Minerals (MULTIVITAMIN PO) Take by mouth   Yes Historical Provider, MD   LORazepam (ATIVAN) 0.5 MG tablet Take 1 tablet by mouth every 12 hours as needed  Patient taking differently: Take 0.5 mg by mouth 2 times daily.   3/31/16  Yes Satinder Sprague MD   lurasidone (LATUDA) 40 MG TABS tablet Take 1 tablet by mouth daily 3/31/16  Yes Satinder Sprague MD   famotidine (PEPCID) 20 MG tablet Take 1 tablet by mouth 2 times daily 3/22/16  Yes JEVON Grissom CNP   atorvastatin (LIPITOR) 40 MG tablet Take 40 mg by mouth daily   Yes Historical Provider, MD   acetaminophen (TYLENOL) 325 MG tablet Take 325 mg by mouth as needed 20   Historical Provider, Anesthesia Evaluation  Patient summary reviewed no history of anesthetic complications:   Airway: Mallampati: II  TM distance: >3 FB   Neck ROM: full  Mouth opening: > = 3 FB Dental:    (+) partials      Pulmonary:   (+) sleep apnea: on CPAP,                             Cardiovascular:                      Neuro/Psych:                ROS comment: Paranoid schizophrenia  GI/Hepatic/Renal:   (+) hepatitis (hx of Hep  C and has had treatment ): C, renal disease: kidney stones,          ROS comment: Diverticulitis  Rectal prolapse. Endo/Other:    (+) Diabetes, . Abdominal:           Vascular:                                        Anesthesia Plan      general     ASA 2       Induction: intravenous. Anesthetic plan and risks discussed with patient.                       Bhavna Mkceon MD   3/23/2021

## 2021-03-23 NOTE — BRIEF OP NOTE
Brief Postoperative Note      Patient: Luis Miguel Garay  YOB: 1955  MRN: [id number]    Date of Procedure: 3/23/2021    Pre-Op Diagnosis: Rectal prolapse [K62.3]    Post-Op Diagnosis: SAME       Procedure(s):  EXAM UNDER ANESTHESIA, DELORME PROCEDURE    Surgeon(s):  Kyle Hardy MD    Assistant:  Resident: Yohan Chandler DPM; Mayi Peña MD    Anesthesia: General    Estimated Blood Loss (mL): 25 cc    Complications: None    Specimens:   ID Type Source Tests Collected by Time Destination   A : A. RECTAL MUCOSA Tissue Tissue SURGICAL PATHOLOGY Kyle Hardy MD 3/23/2021 8002          Drains:   [REMOVED] Urethral Catheter 16 fr (Removed)       Findings: short segment of rectal prolapse treated by delorme operation     Electronically signed by Kyle Hardy MD on 3/23/2021 at 8:54 AM

## 2021-03-23 NOTE — H&P
Ferny Callahan    [id number]    [facility] Same Day Surgery Update H & P  Department of General Surgery   Surgical Service   Pre-operative History and Physical  Last H & P within the last 30 days. DIAGNOSIS:   Rectal prolapse [K62.3]    Procedure(s):  EXAM UNDER ANESTHESIA, DELORME PROCEDURE VERSUS ALTEMEIER OPERATION FOR RECTAL PROLAPSE     HISTORY OF PRESENT ILLNESS:   Patient with rectal prolapse presents today for the above procedure. Covid 19:  Patient denies fever, chills, cough or known exposure to Covid-19.        Past Medical History:        Diagnosis Date    Anxiety, generalized     CPAP (continuous positive airway pressure) dependence     Diabetes mellitus (HCC)     no meds    Diverticulitis     H/O ischemic bowel disease 03/2016    Hepatitis C     History of blood transfusion     s/p hysterectomy    History of kidney stones     x 1 episode    Hyperlipidemia     LBBB (left bundle branch block)     Loose stools     secondary to history of colectomy    Paranoid schizophrenia ([facility] [address]     Proteinuria     history of    Rectal prolapse     Sciatica     Sleep apnea     USES CPAP    Sleep apnea     Wears partial dentures      Past Surgical History:        Procedure Laterality Date    ABDOMEN SURGERY  09/20/2016    OPEN ILEOSTOMY TAKEDOWN                   COLONOSCOPY      COLPOPEXY N/A 4/26/2019    OPEN RECTOPEXY, LYSIS OF ADHESIONS, INTRAOPERATIVE FLEXIBLE SIGMOIDOSCOPY performed by Basilio Zamora MD at [place] [address] Bilateral 4/26/2019    CYSTOSCOPY, BILATERAL URETERAL STENT INSERTION performed by Tequila Lott MD at [address] [place] [address]      w/tubaligation    HYSTERECTOMY      PARTIAL VAGINAL    KIDNEY STONE SURGERY      KIDNEY STONE SURGERY      KNEE SURGERY      bilateral knee replacements    OTHER SURGICAL HISTORY  3/5/16    DIAGNOSTIC LAPAROSCOPY, SUBTOTAL COLECTOMY, COLOSTOMY,    CO COLONOSCOPY FLX DX W/COLLJ SPEC WHEN PFRMD N/A 10/25/2018    COLONOSCOPY, POSSIBLE POLYPECTOMY WITH MAC performed by Rebeca Key MD at [facility] EXTRACTION       Past Social History:  Social History     Socioeconomic History    Marital status:      Spouse name: None    Number of children: None    Years of education: None    Highest education level: None   Occupational History    None   Social Needs    Financial resource strain: None    Food insecurity     Worry: None     Inability: None    Transportation needs     Medical: None     Non-medical: None   Tobacco Use    Smoking status: Current Every Day Smoker     Packs/day: 0.50     Years: 20.00     Pack years: 10.00     Types: Cigarettes     Start date: 3/22/2001     Last attempt to quit: 2016     Years since quittin.0    Smokeless tobacco: Never Used   Substance and Sexual Activity    Alcohol use: No    Drug use: No    Sexual activity: Not Currently   Lifestyle    Physical activity     Days per week: None     Minutes per session: None    Stress: None   Relationships    Social connections     Talks on phone: None     Gets together: None     Attends Mu-ism service: None     Active member of club or organization: None     Attends meetings of clubs or organizations: None     Relationship status: None    Intimate partner violence     Fear of current or ex partner: None     Emotionally abused: None     Physically abused: None     Forced sexual activity: None   Other Topics Concern    None   Social History Narrative    None         Medications Prior to Admission:      Prior to Admission medications    Medication Sig Start Date End Date Taking? Authorizing Provider   polyethylene glycol (MIRALAX) 17 g PACK packet Take 17 g by mouth daily   Yes Historical Provider, MD   zolpidem (AMBIEN) 10 MG tablet  3/9/21  Yes Historical Provider, MD   neomycin (MYCIFRADIN) 500 MG tablet Take two tablets three times the day before surgery.   Take two tablets at 1 pm, two tablets at 2 pm and two tablets at 9 pm. 3/4/21  Yes Sophie Helm MD   metroNIDAZOLE (FLAGYL) 500 MG tablet Take one tablet by mouth three on the day prior to surgery. Take one tablet at 1 pm, 2 pm and 9 pm. 3/4/21  Yes Sophie Helm MD   hydroCHLOROthiazide (HYDRODIURIL) 12.5 MG tablet Take 1 tablet by mouth daily 10/9/20  Yes [facility], APRN - CNP   DULoxetine (CYMBALTA) 20 MG extended release capsule Take 20 mg by mouth daily   Yes Historical Provider, MD   Multiple Vitamins-Minerals (MULTIVITAMIN PO) Take by mouth   Yes Historical Provider, MD   LORazepam (ATIVAN) 0.5 MG tablet Take 1 tablet by mouth every 12 hours as needed  Patient taking differently: Take 0.5 mg by mouth 2 times daily. 3/31/16  Yes Naomi Carrillo MD   lurasidone (LATUDA) 40 MG TABS tablet Take 1 tablet by mouth daily 3/31/16  Yes Naomi Carrillo MD   famotidine (PEPCID) 20 MG tablet Take 1 tablet by mouth 2 times daily 3/22/16  Yes Alfred Cancer, APRN - CNP   atorvastatin (LIPITOR) 40 MG tablet Take 40 mg by mouth daily   Yes Historical Provider, MD   acetaminophen (TYLENOL) 325 MG tablet Take 325 mg by mouth as needed 12/13/20   Historical Provider, MD   loperamide (RA ANTI-DIARRHEAL) 2 MG capsule Take 1 capsule by mouth 4 times daily as needed for Diarrhea 11/4/16   Lara Regalado MD   albuterol sulfate  (90 BASE) MCG/ACT inhaler Inhale 2 puffs into the lungs every 6 hours as needed for Wheezing    Historical Provider, MD   diphenhydrAMINE (BENADRYL) 25 MG capsule Take 25-50 mg by mouth nightly as needed for Sleep     Historical Provider, MD         Allergies:  Patient has no known allergies.     PHYSICAL EXAM:      /66   Pulse 73   Temp 98.4 °F (36.9 °C) (Oral)   Resp 18   Ht 4' 11\" (1.499 m)   Wt 135 lb (61.2 kg)   SpO2 95%   BMI 27.27 kg/m²      Airway:  Airway patent with no audible stridor    Heart:  regular rate and rhythm, No murmur noted    Lungs:  No increased work of breathing, good air exchange, clear to auscultation bilaterally, no crackles or wheezing    Abdomen:  soft, non-distended, non-tender, no rebound tenderness or guarding, normal active bowel sounds and no masses palpated    ASSESSMENT AND PLAN     Patient is a 74 y.o. female with above specified procedure planned. 1.  The patients history and physical was obtained and signed off by the pre-admission testing department. Patient seen and focused exam done today- no new changes since last physical exam on 3/8/21    2. Access to ancillary services are available per request of the provider. Rina Nix, APRN - CNP     3/23/2021      I performed a history and physical examination of the patient and discussed management with the resident. I reviewed the resident's note and agree with the documented findings and plan of care.  JEFF reviewed preoperatively    Serg Newby M.D.  3/23/21   8:52 AM

## 2021-03-23 NOTE — PROGRESS NOTES
Patient to PACU 13 s/p EXAM UNDER ANESTHESIA, DELORME PROCEDURE, report received from CRNA and surgical resident. Reported hemodynamically stable intra op.  Patient BS checked, result 173

## 2021-03-23 NOTE — PROGRESS NOTES
PACU Transfer to Floor Note    Current Allergies: Patient has no known allergies. Pt meets criteria as per Kobe Score and ASPAN Standards to transfer to next phase of care. Recent Labs     03/23/21  0613 03/23/21  0904   POCGLU 171* 173*       Vitals:    03/23/21 1300   BP: 129/83   Pulse: 73   Resp: 19   Temp: 97.5 °F (36.4 °C)   SpO2: 97%     Vitals within 20% of pt's admission vitals as per KOBE SCORE    SpO2: 97 %    O2 Flow Rate (L/min): 1.5 L/min      Intake/Output Summary (Last 24 hours) at 3/23/2021 1315  Last data filed at 3/23/2021 1225  Gross per 24 hour   Intake 250 ml   Output 625 ml   Net -375 ml       Pain assessment:  level of pain (1-10, 10 severe),     Pain Level: 0    Handoff report given at bedside.    Family updated and directed to pt room      3/23/2021 1:15 PM

## 2021-03-23 NOTE — OP NOTE
Operative Note      Patient: Lita Bennett  YOB: 1955  MRN: [id number]    Date of Procedure: 3/23/2021    Pre-Op Diagnosis: Rectal prolapse [K62.3]    Post-Op Diagnosis: same       Procedure(s):  EXAM UNDER ANESTHESIA, DELORME PROCEDURE    Surgeon(s):  Bharati Duarte MD    Assistant:   Resident: Real Mejia DPM; Buzz Eckert MD    Anesthesia: General    Estimated Blood Loss (mL): minimal    Complications: none    Specimens:   ID Type Source Tests Collected by Time Destination   A : A. RECTAL MUCOSA Tissue Tissue SURGICAL PATHOLOGY Bharati Duarte MD 3/23/2021 4636          Drains:   [REMOVED] Urethral Catheter 16 fr (Removed)     Indications: Recurrent rectal prolapse seen in office. Given extensive pelvic scarring in prior open abdominal operation a perineal approach was recommended. Detailed Description of Procedure:     After informed consent was obtained the patient was taken to the operating room. General anesthesia was given. Time out was called to confirm key components. The patient was placed in the lithotomy position with appropriate padding. The patient was then prepped and draped in the usual sterile fashion. We placed a Lonestar retractor and everted the anus. We then reprodcued about 2-3 cm of rectal prolapse though most of this was just mucosal prolapse and very little rectal wall. There was no indication to perform an Altemeier based on the findings. We then made an incision about 1 cm from the dentate line and stripped the mucosa off the muscle. We then imbricated the muscle with 2-0 Vicryl suture. We then excised the excess mucosa and then used 3-0 chromic to suture mucosa to mucosa and complete the Delorme. At this point we did not see any gaps in the mucosal lining. There was no further prolapse at this point. We injected Exparel for post operative pain control. Dr. Kristopher Casey was present throughout and performed all key portions.      Delta Osman M.D.  3/24/21   10:15 AM            Electronically signed by Sophie Helm MD on 3/23/2021 at 1:56 PM

## 2021-03-23 NOTE — PROGRESS NOTES
4 Eyes Admission Assessment     I agree as the admission nurse that 2 RN's have performed a thorough Head to Toe Skin Assessment on the patient. ALL assessment sites listed below have been assessed on admission. Areas assessed by both nurses:   [x]   Head, Face, and Ears   [x]   Shoulders, Back, and Chest  [x]   Arms, Elbows, and Hands   [x]   Coccyx, Sacrum, and Ischium  [x]   Legs, Feet, and Heels        Does the Patient have Skin Breakdown?   No         Noble Prevention initiated:  No   Wound Care Orders initiated:  No      [facility] nurse consulted for Pressure Injury (Stage 3,4, Unstageable, DTI, NWPT, and Complex wounds) or Noble score 18 or lower:  No      Nurse 1 eSignature: Electronically signed by Rosendo Shetty RN on 3/23/21 at 3:15 PM EDT    **SHARE this note so that the co-signing nurse is able to place an eSignature**    Nurse 2 eSignature: Electronically signed by Mark Arias RN on 3/24/21 at 3:50 AM EDT  2

## 2021-03-23 NOTE — PLAN OF CARE
Problem: Falls - Risk of:  Goal: Will remain free from falls  Description: Will remain free from falls  Outcome: Ongoing  Note: UAL steady gait. Call light in reach-uses adequately. Problem: Pain:  Goal: Pain level will decrease  Description: Pain level will decrease  Outcome: Ongoing  Note: 9/10 near bladder. \"Pressure. \" Will monitor.  States pain feels like it is easing

## 2021-03-23 NOTE — ANESTHESIA POSTPROCEDURE EVALUATION
Department of Anesthesiology  Postprocedure Note    Patient: Che Ingram  MRN: [id number]  YOB: 1955  Date of evaluation: 3/23/2021  Time:  12:41 PM     Procedure Summary     Date: 03/23/21 Room / Location: [address] [place] [address] [address] / [facility]    Anesthesia Start: 9394 Anesthesia Stop:     Procedure: EXAM UNDER ANESTHESIA, DELORME PROCEDURE (N/A Perineum) Diagnosis:       Rectal prolapse      (Rectal prolapse [K62.3])    Surgeons: Lacy Mcdonnell MD Responsible Provider: Aby Alfred MD    Anesthesia Type: general ASA Status: 2          Anesthesia Type: No value filed. Glenis Phase I: Glenis Score: 9    Glenis Phase II:      Last vitals: Reviewed and per EMR flowsheets.        Anesthesia Post Evaluation    Patient location during evaluation: PACU  Patient participation: complete - patient participated  Level of consciousness: awake  Airway patency: patent  Nausea & Vomiting: no nausea and no vomiting  Complications: no  Cardiovascular status: hemodynamically stable  Respiratory status: acceptable  Hydration status: stable

## 2021-03-23 NOTE — PROGRESS NOTES
Department of Surgery:  Post-op Note    CC: Rectal prolapse    Subjective:  Pain is controlled  Denies nausea or vomiting. Voiding independently after surgery.     Objective:  Anesthesia type: General      I/O    Intra op    Post op     Fluids  250 mL 0 mL     EBL Minimal 0 mL     Urine 0 mL 550 mL     Physical Exam:  Vitals:    03/23/21 1000 03/23/21 1015 03/23/21 1130 03/23/21 1159   BP: 122/67 120/79 (!) 110/52    Pulse: 66 62 67    Resp:  17 18    Temp:       TempSrc:       SpO2: 100% 100% 99% 96%   Weight:       Height:           General appearance: alert, no acute distress, grooming appropriate  Chest/Lungs: CTAB, no crackles/rales, wheezes/rhonchi, normal effort  Cardiovascular: RRR, no murmurs/gallops/rubs  Abdomen: soft, non tender, non-distended  : Grossly normal  Rectal: Grossly normal, no signs of bleeding, mesh underwear in place  Extremities: no edema, no cyanosis  Neuro: A&Ox3, no focal deficits, sensation intact    Assessment and Plan  This is a 74y.o. year old female with a diagnosis of rectal prolapse s/p Delorme procedure POD #0    Pain management: Oxycodone, dilaudid  Cardiovascular: RRR  Respiratory: extubated, encourage hourly incentive spirometry and deep breathing  FEN:  Fluids: 125 LR, Diet: Full liquids  : Urine output is adequate  Wound: local care  Ambulation: OOB to chair, encourage ambulation  Prophylaxis: SCDyuly roberson DO  03/23/21  12:55 PM

## 2021-03-24 LAB
ALBUMIN SERPL-MCNC: 4.5 G/DL (ref 3.4–5)
ANION GAP SERPL CALCULATED.3IONS-SCNC: 13 MMOL/L (ref 3–16)
BASOPHILS ABSOLUTE: 0 K/UL (ref 0–0.2)
BASOPHILS RELATIVE PERCENT: 0.4 %
BILIRUBIN URINE: NEGATIVE
BLOOD, URINE: NEGATIVE
BUN BLDV-MCNC: 22 MG/DL (ref 7–20)
CALCIUM SERPL-MCNC: 9.9 MG/DL (ref 8.3–10.6)
CHLORIDE BLD-SCNC: 98 MMOL/L (ref 99–110)
CLARITY: CLEAR
CO2: 24 MMOL/L (ref 21–32)
COLOR: YELLOW
CREAT SERPL-MCNC: 1.1 MG/DL (ref 0.6–1.2)
EOSINOPHILS ABSOLUTE: 0 K/UL (ref 0–0.6)
EOSINOPHILS RELATIVE PERCENT: 0.4 %
GFR AFRICAN AMERICAN: 60
GFR NON-AFRICAN AMERICAN: 49
GLUCOSE BLD-MCNC: 207 MG/DL (ref 70–99)
GLUCOSE URINE: NEGATIVE MG/DL
HCT VFR BLD CALC: 39.6 % (ref 36–48)
HEMOGLOBIN: 13.8 G/DL (ref 12–16)
KETONES, URINE: NEGATIVE MG/DL
LEUKOCYTE ESTERASE, URINE: NEGATIVE
LYMPHOCYTES ABSOLUTE: 2.5 K/UL (ref 1–5.1)
LYMPHOCYTES RELATIVE PERCENT: 26.9 %
MAGNESIUM: 1.8 MG/DL (ref 1.8–2.4)
MCH RBC QN AUTO: 33.8 PG (ref 26–34)
MCHC RBC AUTO-ENTMCNC: 34.8 G/DL (ref 31–36)
MCV RBC AUTO: 96.9 FL (ref 80–100)
MICROSCOPIC EXAMINATION: NORMAL
MONOCYTES ABSOLUTE: 0.8 K/UL (ref 0–1.3)
MONOCYTES RELATIVE PERCENT: 8.6 %
NEUTROPHILS ABSOLUTE: 5.9 K/UL (ref 1.7–7.7)
NEUTROPHILS RELATIVE PERCENT: 63.7 %
NITRITE, URINE: NEGATIVE
PDW BLD-RTO: 12.5 % (ref 12.4–15.4)
PH UA: 6 (ref 5–8)
PHOSPHORUS: 4.2 MG/DL (ref 2.5–4.9)
PLATELET # BLD: 241 K/UL (ref 135–450)
PMV BLD AUTO: 7.5 FL (ref 5–10.5)
POTASSIUM SERPL-SCNC: 4.7 MMOL/L (ref 3.5–5.1)
PROTEIN UA: NEGATIVE MG/DL
RBC # BLD: 4.09 M/UL (ref 4–5.2)
SODIUM BLD-SCNC: 135 MMOL/L (ref 136–145)
SPECIFIC GRAVITY UA: 1.01 (ref 1–1.03)
URINE REFLEX TO CULTURE: NORMAL
URINE TYPE: NORMAL
UROBILINOGEN, URINE: 0.2 E.U./DL
WBC # BLD: 9.3 K/UL (ref 4–11)

## 2021-03-24 PROCEDURE — 36415 COLL VENOUS BLD VENIPUNCTURE: CPT

## 2021-03-24 PROCEDURE — 94660 CPAP INITIATION&MGMT: CPT

## 2021-03-24 PROCEDURE — 2580000003 HC RX 258: Performed by: SURGERY

## 2021-03-24 PROCEDURE — 1200000000 HC SEMI PRIVATE

## 2021-03-24 PROCEDURE — 51701 INSERT BLADDER CATHETER: CPT

## 2021-03-24 PROCEDURE — 6360000002 HC RX W HCPCS: Performed by: SURGERY

## 2021-03-24 PROCEDURE — 6370000000 HC RX 637 (ALT 250 FOR IP): Performed by: STUDENT IN AN ORGANIZED HEALTH CARE EDUCATION/TRAINING PROGRAM

## 2021-03-24 PROCEDURE — 85025 COMPLETE CBC W/AUTO DIFF WBC: CPT

## 2021-03-24 PROCEDURE — 83735 ASSAY OF MAGNESIUM: CPT

## 2021-03-24 PROCEDURE — 81003 URINALYSIS AUTO W/O SCOPE: CPT

## 2021-03-24 PROCEDURE — 51702 INSERT TEMP BLADDER CATH: CPT

## 2021-03-24 PROCEDURE — 6370000000 HC RX 637 (ALT 250 FOR IP): Performed by: SURGERY

## 2021-03-24 PROCEDURE — 6360000002 HC RX W HCPCS: Performed by: STUDENT IN AN ORGANIZED HEALTH CARE EDUCATION/TRAINING PROGRAM

## 2021-03-24 PROCEDURE — 6370000000 HC RX 637 (ALT 250 FOR IP): Performed by: NURSE PRACTITIONER

## 2021-03-24 PROCEDURE — 80069 RENAL FUNCTION PANEL: CPT

## 2021-03-24 PROCEDURE — 51798 US URINE CAPACITY MEASURE: CPT

## 2021-03-24 RX ORDER — FAMOTIDINE 20 MG/1
20 TABLET, FILM COATED ORAL DAILY
Status: DISCONTINUED | OUTPATIENT
Start: 2021-03-25 | End: 2021-03-25 | Stop reason: HOSPADM

## 2021-03-24 RX ORDER — OXYCODONE HYDROCHLORIDE 5 MG/1
5 TABLET ORAL EVERY 6 HOURS PRN
Qty: 20 TABLET | Refills: 0 | Status: SHIPPED | OUTPATIENT
Start: 2021-03-24 | End: 2021-03-29

## 2021-03-24 RX ORDER — MAGNESIUM SULFATE IN WATER 40 MG/ML
2000 INJECTION, SOLUTION INTRAVENOUS ONCE
Status: COMPLETED | OUTPATIENT
Start: 2021-03-24 | End: 2021-03-24

## 2021-03-24 RX ORDER — TAMSULOSIN HYDROCHLORIDE 0.4 MG/1
0.4 CAPSULE ORAL DAILY
Status: DISCONTINUED | OUTPATIENT
Start: 2021-03-24 | End: 2021-03-25 | Stop reason: HOSPADM

## 2021-03-24 RX ORDER — DOCUSATE SODIUM 100 MG/1
100 CAPSULE, LIQUID FILLED ORAL DAILY
Status: DISCONTINUED | OUTPATIENT
Start: 2021-03-24 | End: 2021-03-25 | Stop reason: HOSPADM

## 2021-03-24 RX ORDER — DOCUSATE SODIUM 100 MG/1
100 CAPSULE, LIQUID FILLED ORAL DAILY PRN
Qty: 30 CAPSULE | Refills: 0 | Status: SHIPPED | OUTPATIENT
Start: 2021-03-24 | End: 2021-04-23

## 2021-03-24 RX ADMIN — DULOXETINE HYDROCHLORIDE 20 MG: 20 CAPSULE, DELAYED RELEASE ORAL at 20:20

## 2021-03-24 RX ADMIN — ATORVASTATIN CALCIUM 40 MG: 40 TABLET, FILM COATED ORAL at 20:20

## 2021-03-24 RX ADMIN — ACETAMINOPHEN 1000 MG: 500 TABLET, FILM COATED ORAL at 13:44

## 2021-03-24 RX ADMIN — ACETAMINOPHEN 1000 MG: 500 TABLET, FILM COATED ORAL at 03:45

## 2021-03-24 RX ADMIN — TAMSULOSIN HYDROCHLORIDE 0.4 MG: 0.4 CAPSULE ORAL at 13:44

## 2021-03-24 RX ADMIN — ENOXAPARIN SODIUM 40 MG: 40 INJECTION SUBCUTANEOUS at 12:11

## 2021-03-24 RX ADMIN — LURASIDONE HYDROCHLORIDE 40 MG: 40 TABLET, FILM COATED ORAL at 18:30

## 2021-03-24 RX ADMIN — HYDROMORPHONE HYDROCHLORIDE 0.25 MG: 1 INJECTION, SOLUTION INTRAMUSCULAR; INTRAVENOUS; SUBCUTANEOUS at 20:59

## 2021-03-24 RX ADMIN — HYDROMORPHONE HYDROCHLORIDE 0.5 MG: 1 INJECTION, SOLUTION INTRAMUSCULAR; INTRAVENOUS; SUBCUTANEOUS at 04:23

## 2021-03-24 RX ADMIN — HYDROMORPHONE HYDROCHLORIDE 0.25 MG: 1 INJECTION, SOLUTION INTRAMUSCULAR; INTRAVENOUS; SUBCUTANEOUS at 12:38

## 2021-03-24 RX ADMIN — OXYCODONE 10 MG: 5 TABLET ORAL at 18:29

## 2021-03-24 RX ADMIN — OXYCODONE 10 MG: 5 TABLET ORAL at 03:17

## 2021-03-24 RX ADMIN — Medication 10 ML: at 12:11

## 2021-03-24 RX ADMIN — ACETAMINOPHEN 1000 MG: 500 TABLET, FILM COATED ORAL at 07:27

## 2021-03-24 RX ADMIN — LORAZEPAM 0.5 MG: 0.5 TABLET ORAL at 18:30

## 2021-03-24 RX ADMIN — ZOLPIDEM TARTRATE 5 MG: 5 TABLET ORAL at 20:59

## 2021-03-24 RX ADMIN — MAGNESIUM SULFATE HEPTAHYDRATE 2000 MG: 40 INJECTION, SOLUTION INTRAVENOUS at 14:13

## 2021-03-24 RX ADMIN — Medication 10 ML: at 20:21

## 2021-03-24 RX ADMIN — OXYCODONE 10 MG: 5 TABLET ORAL at 10:10

## 2021-03-24 ASSESSMENT — PAIN DESCRIPTION - LOCATION
LOCATION: HEAD
LOCATION: HEAD
LOCATION: ABDOMEN

## 2021-03-24 ASSESSMENT — PAIN DESCRIPTION - PAIN TYPE
TYPE: SURGICAL PAIN
TYPE: ACUTE PAIN
TYPE: SURGICAL PAIN
TYPE: SURGICAL PAIN

## 2021-03-24 ASSESSMENT — PAIN DESCRIPTION - ONSET
ONSET: GRADUAL
ONSET: ON-GOING

## 2021-03-24 ASSESSMENT — PAIN DESCRIPTION - ORIENTATION
ORIENTATION: LOWER
ORIENTATION: LOWER

## 2021-03-24 ASSESSMENT — PAIN DESCRIPTION - FREQUENCY
FREQUENCY: CONTINUOUS
FREQUENCY: INTERMITTENT

## 2021-03-24 ASSESSMENT — PAIN - FUNCTIONAL ASSESSMENT
PAIN_FUNCTIONAL_ASSESSMENT: ACTIVITIES ARE NOT PREVENTED

## 2021-03-24 ASSESSMENT — PAIN DESCRIPTION - DESCRIPTORS
DESCRIPTORS: ACHING
DESCRIPTORS: ACHING;PRESSURE

## 2021-03-24 ASSESSMENT — PAIN DESCRIPTION - PROGRESSION
CLINICAL_PROGRESSION: GRADUALLY WORSENING
CLINICAL_PROGRESSION: GRADUALLY WORSENING
CLINICAL_PROGRESSION: NOT CHANGED
CLINICAL_PROGRESSION: GRADUALLY WORSENING

## 2021-03-24 ASSESSMENT — PAIN SCALES - GENERAL
PAINLEVEL_OUTOF10: 8
PAINLEVEL_OUTOF10: 10
PAINLEVEL_OUTOF10: 8

## 2021-03-24 NOTE — PROGRESS NOTES
Pt. Awake in bed. Assessment complete, VSS, afebrile, denies pain at this time. No s/sx of distress noted. Pt. Up ad philippe in room, ambulates to bath room and back to bed. Call light and over bed table within reach. Hourly rounding and frequent visual checks in place.

## 2021-03-24 NOTE — PLAN OF CARE
Problem: Urinary Elimination:  Goal: Ability to recognize the need to void and respond appropriately will improve  Description: Ability to recognize the need to void and respond appropriately will improve  Outcome: Not Met This Shift   Pt unable to void on own this shift. Straight cathed pt x2.

## 2021-03-24 NOTE — PROGRESS NOTES
C/o headache. Given Oxycodone and Dilaudid this shift. States Tylenol doesn't work. Pt irritable dt mandel catheter discomfort. Remains UAL w/ steady gait in room. Mandel having yellow/clear output. Sx site CDI. Pt does swetha care independently.

## 2021-03-24 NOTE — PROGRESS NOTES
Pt c/o pressure in lower abdomen. Not able to void herself after being straight cathed at 2145. Bladder scanned pt, scan showed approx 430ml. Received orders for straight cath. Straight cathed pt using sterile technique for 150 ml urine. Pt still reports discomfort. Dr. Garnica Failing made aware.

## 2021-03-24 NOTE — PROGRESS NOTES
Pt unable to void and complaining of pressure. Received order for straight cath. Pt straight cathed using sterile technique for 200 ml urine. Pt tolerate well. Pt states she feels better.

## 2021-03-25 VITALS
DIASTOLIC BLOOD PRESSURE: 77 MMHG | HEART RATE: 90 BPM | HEIGHT: 59 IN | TEMPERATURE: 97.5 F | OXYGEN SATURATION: 96 % | BODY MASS INDEX: 34.76 KG/M2 | SYSTOLIC BLOOD PRESSURE: 120 MMHG | WEIGHT: 172.4 LBS | RESPIRATION RATE: 18 BRPM

## 2021-03-25 LAB
ALBUMIN SERPL-MCNC: 4.3 G/DL (ref 3.4–5)
ANION GAP SERPL CALCULATED.3IONS-SCNC: 8 MMOL/L (ref 3–16)
BASOPHILS ABSOLUTE: 0 K/UL (ref 0–0.2)
BASOPHILS RELATIVE PERCENT: 0.4 %
BUN BLDV-MCNC: 15 MG/DL (ref 7–20)
CALCIUM SERPL-MCNC: 9.5 MG/DL (ref 8.3–10.6)
CHLORIDE BLD-SCNC: 99 MMOL/L (ref 99–110)
CO2: 28 MMOL/L (ref 21–32)
CREAT SERPL-MCNC: 0.8 MG/DL (ref 0.6–1.2)
EOSINOPHILS ABSOLUTE: 0.1 K/UL (ref 0–0.6)
EOSINOPHILS RELATIVE PERCENT: 2.3 %
GFR AFRICAN AMERICAN: >60
GFR NON-AFRICAN AMERICAN: >60
GLUCOSE BLD-MCNC: 220 MG/DL (ref 70–99)
HCT VFR BLD CALC: 38.7 % (ref 36–48)
HEMOGLOBIN: 13.2 G/DL (ref 12–16)
LYMPHOCYTES ABSOLUTE: 2.2 K/UL (ref 1–5.1)
LYMPHOCYTES RELATIVE PERCENT: 36.8 %
MAGNESIUM: 2 MG/DL (ref 1.8–2.4)
MCH RBC QN AUTO: 33.5 PG (ref 26–34)
MCHC RBC AUTO-ENTMCNC: 34.1 G/DL (ref 31–36)
MCV RBC AUTO: 98 FL (ref 80–100)
MONOCYTES ABSOLUTE: 0.8 K/UL (ref 0–1.3)
MONOCYTES RELATIVE PERCENT: 12.7 %
NEUTROPHILS ABSOLUTE: 2.8 K/UL (ref 1.7–7.7)
NEUTROPHILS RELATIVE PERCENT: 47.8 %
PDW BLD-RTO: 12.8 % (ref 12.4–15.4)
PHOSPHORUS: 2.8 MG/DL (ref 2.5–4.9)
PLATELET # BLD: 213 K/UL (ref 135–450)
PMV BLD AUTO: 7.5 FL (ref 5–10.5)
POTASSIUM SERPL-SCNC: 5.2 MMOL/L (ref 3.5–5.1)
RBC # BLD: 3.95 M/UL (ref 4–5.2)
SODIUM BLD-SCNC: 135 MMOL/L (ref 136–145)
WBC # BLD: 5.9 K/UL (ref 4–11)

## 2021-03-25 PROCEDURE — 6360000002 HC RX W HCPCS: Performed by: SURGERY

## 2021-03-25 PROCEDURE — 36415 COLL VENOUS BLD VENIPUNCTURE: CPT

## 2021-03-25 PROCEDURE — 6370000000 HC RX 637 (ALT 250 FOR IP): Performed by: NURSE PRACTITIONER

## 2021-03-25 PROCEDURE — 85025 COMPLETE CBC W/AUTO DIFF WBC: CPT

## 2021-03-25 PROCEDURE — 80069 RENAL FUNCTION PANEL: CPT

## 2021-03-25 PROCEDURE — 83735 ASSAY OF MAGNESIUM: CPT

## 2021-03-25 PROCEDURE — 94660 CPAP INITIATION&MGMT: CPT

## 2021-03-25 PROCEDURE — 6370000000 HC RX 637 (ALT 250 FOR IP): Performed by: SURGERY

## 2021-03-25 RX ORDER — TAMSULOSIN HYDROCHLORIDE 0.4 MG/1
0.4 CAPSULE ORAL DAILY
Qty: 7 CAPSULE | Refills: 0 | Status: SHIPPED | OUTPATIENT
Start: 2021-03-25 | End: 2021-08-30 | Stop reason: ALTCHOICE

## 2021-03-25 RX ADMIN — ACETAMINOPHEN 1000 MG: 500 TABLET, FILM COATED ORAL at 07:37

## 2021-03-25 RX ADMIN — TAMSULOSIN HYDROCHLORIDE 0.4 MG: 0.4 CAPSULE ORAL at 07:37

## 2021-03-25 RX ADMIN — OXYCODONE 10 MG: 5 TABLET ORAL at 03:13

## 2021-03-25 RX ADMIN — ENOXAPARIN SODIUM 40 MG: 40 INJECTION SUBCUTANEOUS at 07:37

## 2021-03-25 RX ADMIN — FAMOTIDINE 20 MG: 20 TABLET, FILM COATED ORAL at 07:37

## 2021-03-25 ASSESSMENT — PAIN DESCRIPTION - LOCATION: LOCATION: RECTUM

## 2021-03-25 ASSESSMENT — PAIN DESCRIPTION - PAIN TYPE: TYPE: ACUTE PAIN

## 2021-03-25 ASSESSMENT — PAIN DESCRIPTION - PROGRESSION: CLINICAL_PROGRESSION: GRADUALLY WORSENING

## 2021-03-25 ASSESSMENT — PAIN SCALES - GENERAL: PAINLEVEL_OUTOF10: 0

## 2021-03-25 ASSESSMENT — PAIN DESCRIPTION - FREQUENCY: FREQUENCY: INTERMITTENT

## 2021-03-25 ASSESSMENT — PAIN DESCRIPTION - DESCRIPTORS: DESCRIPTORS: ACHING

## 2021-03-25 NOTE — PLAN OF CARE
Problem: Pain:  Goal: Pain level will decrease  Description: Pain level will decrease  3/25/2021 0910 by Dbe Chua  Outcome: Ongoing  Note: Patient reports pain as tolerable at this time. Scheduled acetaminophen administered to help w/ breakthrough pain. Patient educate don available pain medication at this time.

## 2021-03-25 NOTE — PLAN OF CARE
Problem: Falls - Risk of:  Goal: Will remain free from falls  Description: Will remain free from falls  Outcome: Ongoing  Note: Pratima Palomo remained safe and free of falls during this shift,   Goal: Absence of physical injury  Description: Absence of physical injury  Outcome: Ongoing     Problem: Pain:  Goal: Pain level will decrease  Description: Pain level will decrease  Outcome: Ongoing  Note: Pratima Palomo was given PRN pain medication to help control her discomfort  Goal: Control of acute pain  Description: Control of acute pain  Outcome: Ongoing  Goal: Control of chronic pain  Description: Control of chronic pain  Outcome: Ongoing     Problem: Infection - Surgical Site:  Goal: Will show no infection signs and symptoms  Description: Will show no infection signs and symptoms  Outcome: Ongoing  Note: Pratima Palomo showed no signs of infection during this shift.       Problem: Discharge Planning:  Goal: Participates in care planning  Description: Participates in care planning  Outcome: Ongoing  Goal: Discharged to appropriate level of care  Description: Discharged to appropriate level of care  Outcome: Ongoing     Problem: Health Behavior:  Goal: Compliance with treatment plan for underlying cause of condition will improve  Description: Compliance with treatment plan for underlying cause of condition will improve  Outcome: Ongoing  Goal: Identification of resources available to assist in meeting health care needs will improve  Description: Identification of resources available to assist in meeting health care needs will improve  Outcome: Ongoing     Problem: Fluid Volume:  Goal: Maintenance of adequate hydration will improve  Description: Maintenance of adequate hydration will improve  Outcome: Ongoing     Problem: Urinary Elimination:  Goal: Skin integrity will improve  Description: Skin integrity will improve  Outcome: Ongoing  Goal: Ability to recognize the need to void and respond appropriately will improve  Description: Ability to recognize the need to void and respond appropriately will improve  Outcome: Ongoing  Goal: Will remain free from infection  Description: Will remain free from infection  Outcome: Ongoing  Goal: Incidence of incontinence will decrease  Description: Incidence of incontinence will decrease  Outcome: Ongoing

## 2021-03-25 NOTE — PROGRESS NOTES
Discharge Progress Note  3/25/2021 10:03 AM    Data:  Discharge order received. Action:  IV D/C'd without difficulty. See Doc Flowsheets for assessment. Patient given discharge instructions with prescriptions. Response:  Patient verbalized understanding of discharge instructions. Patient left with all belongings.     Gerardo Ni  ________________________________________________________________________

## 2021-03-25 NOTE — PROGRESS NOTES
Patient A&O, VSS. Patient denies pain at this time. Patient was provided with a packet with leg bag education/instructions. All questions were answered. Patient was provided with supply for leg bags and mandel bag to change at home. Patient verbalized understanding on how to care for a leg bag. /77   Pulse 90   Temp 97.5 °F (36.4 °C) (Oral)   Resp 18   Ht 4' 11\" (1.499 m)   Wt 172 lb 6.4 oz (78.2 kg)   SpO2 96%   BMI 34.82 kg/m²     Patient denies any further needs at this time. Bed is in the lowest position, bed alarm off, patient call light and bedside table are within reach. Will continue to monitor for changes in patient status.     Electronically signed by Gerardo Ni on 3/25/2021 at 9:13 AM

## 2021-03-25 NOTE — PROGRESS NOTES
Surgery Daily Progress Note      CC: Rectal prolapse    SUBJECTIVE:  Patient rested well overnight. Afebrile and HDS. Continues to have bowel movements. Pain is controlled. Patient had mandel placed yesterday for urinary retention. ROS:   A 14 point review of systems was conducted, significant findings as noted above. All other systems negative.     OBJECTIVE:    PHYSICAL EXAM:  Vitals:    03/24/21 1944 03/24/21 2328 03/25/21 0104 03/25/21 0313   BP: (!) 141/76 134/72  134/72   Pulse: 80 79  82   Resp: 18 18 18 18   Temp: 97.9 °F (36.6 °C) 98.2 °F (36.8 °C)  98.2 °F (36.8 °C)   TempSrc: Oral Oral  Oral   SpO2: 95% 94% 95% 94%   Weight:    172 lb 6.4 oz (78.2 kg)   Height:         General appearance: alert, no acute distress, grooming appropriate  Chest/Lungs: symmetrical chest rise, normal effort  Cardiovascular: RRR  Abdomen: soft, non tender, non-distended  : mandel in place with clear yellow urine  Rectal: Grossly normal, no signs of bleeding, mesh underwear in place  Extremities: no edema, no cyanosis  Neuro: A&Ox3, no focal deficits, sensation intact    ASSESSMENT & PLAN:   This is a 74 y.o. female year old female with a diagnosis of rectal prolapse s/p Delorme procedure POD #2    - Continue full liquids diet  - Mandel placed yesterday for continued urinary retention, patient will follow up in clinic with Casey on Monday for removal of mandel  - Continue flomax, will go home on 7 day course  - Continue colace PRN for bowel regiment   - Discharge home later today    Shea Bowles DO  03/25/21  6:10 AM  [phone]

## 2021-03-25 NOTE — CARE COORDINATION
CM following, POD#1 rectal prolapse repair, pt with voiding issues FC placed plan to monitor over night will DC home no needs once cleared by surgery.   Electronically signed by Chrissy Taylor RN on 3/24/2021 at 2:43 PM  [phone]
discharge: Not Applicable  EDWIN Completed: Not Indicated    Notification completed in HENS/PAS?:  Not Applicable    IMM Completed:   Not Indicated    Transportation:  Transportation PLAN for discharge: family   Mode of Transport: [address] ordered at discharge: Not [address]: Not Applicable  Orders faxed: No    Durable Medical Equipment:  DME Provider: none  Equipment obtained during hospitalization:     Home Oxygen and Respiratory Equipment:  Oxygen needed at discharge?: Not [address]: Not Applicable  Portable tank available for discharge?: Not Indicated    Dialysis:  Dialysis patient: No    Dialysis Center:  Not Applicable      Additional CM Notes: Pt from home, will DC home wth family support no need at DC    The Plan for Transition of Care is related to the following treatment goals of Rectal prolapse [K62.3]    The Patient and/or patient representative Tavia Fuentes and her family were provided with a choice of provider and agrees with the discharge plan Yes    Freedom of choice list was provided with basic dialogue that supports the patient's individualized plan of care/goals and shares the quality data associated with the providers.  Not Indicated    Care Transitions patient: No    Damian Foster RN  [facility]  Case Management Department  Ph: [phone]  Fax: [phone]

## 2021-03-26 ENCOUNTER — CARE COORDINATION (OUTPATIENT)
Dept: CASE MANAGEMENT | Age: 66
End: 2021-03-26

## 2021-03-26 NOTE — CARE COORDINATION
[facility] Transitions Initial Follow Up Call    Call within 2 business days of discharge:  Yes     Patient: Tristan Silva Patient : 1955   MRN: [id number]   Reason for Admission: covid 19   Discharge Date: 3/25/21 RARS: Readmission Risk Score: 11      Last Discharge [address]       Complaint Diagnosis Description Type Department Provider    3/23/21  Post-operative pain . .. Admission (Discharged) Heri Gasca MD           Spoke with: [address]: [facility]      Summary  CTN spoke to the Pt who denies any s/s r/t Covid 19. Pt states she has had both doses of Covid vaccine and not interested in CT calls. From [place]: Are you at higher risk for severe illness?  Wash your hands often.  Avoid close contact (6 feet, which is about two arm lengths) with people who are sick.  Put distance between yourself and other people if COVID-19 is spreading in your community.  Clean and disinfect frequently touched surfaces.  Avoid all cruise travel and non-essential air travel.  Call your healthcare professional if you have concerns about COVID-19 and your underlying condition or if you are sick. Pt will take all meds as prescribed and schedule / keep doctors appt. [facility] provided education on s/s that require medical attention and when to seek medical attention. [facility] advised Pt of use urgent care or physicians 24 hr access line if assistance is needed after hours or on the weekend. Pt denies any needs or concerns and CT calls have concluded.      Follow Up  Future Appointments   Date Time Provider Department Center   3/29/2021 10:30 AM MD Moy Gtz [facility]   2021  9:15 AM MD Maria E Rossi RN

## 2021-03-29 ENCOUNTER — OFFICE VISIT (OUTPATIENT)
Dept: SURGERY | Age: 66
End: 2021-03-29

## 2021-03-29 VITALS
HEIGHT: 59 IN | DIASTOLIC BLOOD PRESSURE: 88 MMHG | WEIGHT: 175 LBS | HEART RATE: 73 BPM | BODY MASS INDEX: 35.28 KG/M2 | TEMPERATURE: 97.3 F | SYSTOLIC BLOOD PRESSURE: 166 MMHG

## 2021-03-29 DIAGNOSIS — K62.3 RECTAL PROLAPSE: Primary | ICD-10-CM

## 2021-03-29 PROCEDURE — 99024 POSTOP FOLLOW-UP VISIT: CPT | Performed by: SURGERY

## 2021-03-29 RX ORDER — OXYCODONE HYDROCHLORIDE 5 MG/1
5 TABLET ORAL EVERY 6 HOURS PRN
Qty: 28 TABLET | Refills: 0 | Status: SHIPPED | OUTPATIENT
Start: 2021-03-29 | End: 2021-04-05

## 2021-03-29 NOTE — PROGRESS NOTES
Subjective:       Anurag Lainez presents to the clinic 1 weeks following Delorme. Eating a regular diet without difficulty. Bowel movements are Normal.  The patient is having having a small amount of rectal pain. Objective:      BP (!) 166/88 Comment: recheck after 5 minutes  Pulse 73   Temp 97.3 °F (36.3 °C) (Infrared)   Ht 4' 11\" (1.499 m)   Wt 175 lb (79.4 kg)   BMI 35.35 kg/m²     General:  alert, appears stated age and cooperative   Abdomen: soft, bowel sounds active, non-tender   Incision:   healing well, no drainage, no erythema, no dehiscence       Assessment:      Doing well postoperatively. Plan:      1. Continue any current medications. 2. Wound care discussed. Damon removed  3.  OK for pain med refill    See me as needed    Zoran Akers M.D.  3/29/21   10:51 AM

## 2021-04-13 NOTE — DISCHARGE SUMMARY
Colorectal Surgery  Discharge Summary    Patient:  Sky Stone    Admit Date: 3/23/2021  5:16 AM  Discharge Date: 3/25/2021    Admitting Physician: Cm Mancia MD     Discharge Physician: Same    Admitting Diagnosis: Rectal prolapse     Discharge Diagnosis: Same    Consults: None    HPI:   Ms. Kamari Gerardo presents with a history of rectopexy two years ago. She did well with no prolapse up until a few weeks ago when she started noticing it again. It always reduces. She has been regular. She reports a normal colonoscopy in June. Hospital Course:   Patient underwent EUA & Delorme procedure. Patient recovered well in the PACU and was subsequently transferred to the general medical/surgical floor for continued post-operative care. She experienced post-operative urinary retention on POD0 requiring replacement of mandel catheter and replacement of mandel catheter on POD1 after failed void check. She was started on flomax with the plan to be discharged with an in-dwelling urinary catheter until follow-up. Patient was reporting that pain was well-controlled with PO analgesics only, was tolerating PO intake, and was ambulating without any issues. Accordingly, patient was discharged home with return precautions and instructions to follow-up as an out-patient.      Disposition:   Home     Discharge Physical Exam:   General appearance: alert, no acute distress, grooming appropriate  Chest/Lungs: symmetrical chest rise, normal effort  Cardiovascular: RRR  Abdomen: soft, non tender, non-distended  : mandel in place with clear yellow urine  Rectal: Grossly normal, no signs of bleeding, mesh underwear in place  Extremities: no edema, no cyanosis  Neuro: A&Ox3, no focal deficits, sensation intact    Condition at discharge: Stable    Discharge Instructions: See separate form    Yue Capellan MD, MPH  PGY-2 General Surgery

## 2021-04-27 ENCOUNTER — TELEPHONE (OUTPATIENT)
Dept: CARDIOLOGY CLINIC | Age: 66
End: 2021-04-27

## 2021-04-27 NOTE — TELEPHONE ENCOUNTER
The patient called stating her B/P when she went to her PCP was 150/86. The patient would like to know if she should change her medication dose. The patient also states she has a slight headache when she wakes up every morning. Please call the patient back at [phone] to advise.

## 2021-04-28 RX ORDER — AMLODIPINE BESYLATE 5 MG/1
5 TABLET ORAL DAILY
Qty: 90 TABLET | Refills: 1 | Status: SHIPPED | OUTPATIENT
Start: 2021-04-28 | End: 2021-05-26 | Stop reason: SDUPTHER

## 2021-04-30 ENCOUNTER — TELEPHONE (OUTPATIENT)
Dept: SURGERY | Age: 66
End: 2021-04-30

## 2021-04-30 NOTE — TELEPHONE ENCOUNTER
Patient states that she is still having rectal some spotting and it feels tender when she is sitting    Patient is 30 days post op    Please contact the patient at [phone]

## 2021-05-26 ENCOUNTER — OFFICE VISIT (OUTPATIENT)
Dept: CARDIOLOGY CLINIC | Age: 66
End: 2021-05-26
Payer: COMMERCIAL

## 2021-05-26 VITALS
WEIGHT: 175.6 LBS | OXYGEN SATURATION: 99 % | HEART RATE: 77 BPM | DIASTOLIC BLOOD PRESSURE: 82 MMHG | HEIGHT: 59 IN | BODY MASS INDEX: 35.4 KG/M2 | SYSTOLIC BLOOD PRESSURE: 154 MMHG

## 2021-05-26 DIAGNOSIS — E78.2 MIXED HYPERLIPIDEMIA: ICD-10-CM

## 2021-05-26 DIAGNOSIS — I10 ESSENTIAL HYPERTENSION: Primary | ICD-10-CM

## 2021-05-26 DIAGNOSIS — I44.7 LBBB (LEFT BUNDLE BRANCH BLOCK): ICD-10-CM

## 2021-05-26 PROCEDURE — 4004F PT TOBACCO SCREEN RCVD TLK: CPT | Performed by: NURSE PRACTITIONER

## 2021-05-26 PROCEDURE — 99214 OFFICE O/P EST MOD 30 MIN: CPT | Performed by: NURSE PRACTITIONER

## 2021-05-26 PROCEDURE — G8427 DOCREV CUR MEDS BY ELIG CLIN: HCPCS | Performed by: NURSE PRACTITIONER

## 2021-05-26 PROCEDURE — 1090F PRES/ABSN URINE INCON ASSESS: CPT | Performed by: NURSE PRACTITIONER

## 2021-05-26 PROCEDURE — 3017F COLORECTAL CA SCREEN DOC REV: CPT | Performed by: NURSE PRACTITIONER

## 2021-05-26 PROCEDURE — G8399 PT W/DXA RESULTS DOCUMENT: HCPCS | Performed by: NURSE PRACTITIONER

## 2021-05-26 PROCEDURE — 1123F ACP DISCUSS/DSCN MKR DOCD: CPT | Performed by: NURSE PRACTITIONER

## 2021-05-26 PROCEDURE — 4040F PNEUMOC VAC/ADMIN/RCVD: CPT | Performed by: NURSE PRACTITIONER

## 2021-05-26 PROCEDURE — G8417 CALC BMI ABV UP PARAM F/U: HCPCS | Performed by: NURSE PRACTITIONER

## 2021-05-26 RX ORDER — GLIPIZIDE 5 MG/1
5 TABLET, FILM COATED, EXTENDED RELEASE ORAL
COMMUNITY
Start: 2021-05-11

## 2021-05-26 RX ORDER — AMLODIPINE BESYLATE 10 MG/1
10 TABLET ORAL DAILY
Qty: 90 TABLET | Refills: 3 | Status: SHIPPED | OUTPATIENT
Start: 2021-05-26

## 2021-05-26 NOTE — PROGRESS NOTES
CC HTN    HPI:  74 y.o. patient of Dr Tamara Baez with HTN, HLD, and LBBB who is here for BP management. BP at home 150-160/70-90's, HR 60-80's. We checked her home machine (149/90) to manual cuff (142/70). She denies cp, sob, LH/dizziness, palpitations or syncope. No LE edema, orthopnea or PND. No fever, chills, n/v/d or Gi/ bleeding. Headaches went away after stopping HCTZ.      Past Medical History:   Diagnosis Date    Anxiety, generalized     CPAP (continuous positive airway pressure) dependence     Diabetes mellitus (HCC)     no meds    Diverticulitis     H/O ischemic bowel disease 03/2016    Hepatitis C     History of blood transfusion     s/p hysterectomy    History of kidney stones     x 1 episode    Hyperlipidemia     LBBB (left bundle branch block)     Loose stools     secondary to history of colectomy    Paranoid schizophrenia ([facility] [address]     Proteinuria     history of    Rectal prolapse     Sciatica     Sleep apnea     USES CPAP    Sleep apnea     Wears partial dentures      Past Surgical History:   Procedure Laterality Date    ABDOMEN SURGERY  09/20/2016    OPEN ILEOSTOMY TAKEDOWN                   COLONOSCOPY      COLPOPEXY N/A 4/26/2019    OPEN RECTOPEXY, LYSIS OF ADHESIONS, INTRAOPERATIVE FLEXIBLE SIGMOIDOSCOPY performed by Adolfo Escalona MD at [place] [address] Bilateral 4/26/2019    CYSTOSCOPY, BILATERAL URETERAL STENT INSERTION performed by Rhett Hernandez MD at [address] [place] [address]      w/tubaligation    HYSTERECTOMY      PARTIAL VAGINAL    [place]      bilateral knee replacements    OTHER SURGICAL HISTORY  3/5/16    DIAGNOSTIC LAPAROSCOPY, SUBTOTAL COLECTOMY, COLOSTOMY,    [place] COLONOSCOPY FLX DX W/COLLJ SPEC WHEN PFRMD N/A 10/25/2018    COLONOSCOPY, POSSIBLE POLYPECTOMY WITH MAC performed by Manuela Hooper MD at [address] [place] [address] N/A 3/23/2021    EXAM UNDER ANESTHESIA, DELORME PROCEDURE performed by Minerva Ovalle MD at [address] EXTRACTION       No family history on file. Social History     Tobacco Use    Smoking status: Current Every Day Smoker     Packs/day: 0.50     Years: 20.00     Pack years: 10.00     Types: Cigarettes     Start date: 3/22/2001     Last attempt to quit: 2016     Years since quittin.2    Smokeless tobacco: Never Used   Vaping Use    Vaping Use: Never used   Substance Use Topics    Alcohol use: No    Drug use: No     Allergies:Patient has no known allergies. Review of Systems  General: No changes in weight, fatigue, or night sweats. HEENT: No blurry or decreased vision. No changes in hearing, nasal discharge or sore throat. Cardiovascular:  See HPI. Respiratory: No cough, hemoptysis, or wheezing. Gastrointestinal:  No abdominal pain, hematochezia, melana, constipation, diarrhea, or history of GI ulcers. Genito-Urinary: No dysuria or hematuria. No urgency or polyuria. Musculoskeletal:  No complaints of joint pain, joint swelling or muscular weakness/soreness. Neurological:  No dizziness, numbness/tingling, speech problems or weakness. Psychological:  No anxiety or depression. Hematological and Lymphatic: No abnormal bleeding or bruising, blood clots, jaundice or swollen lymph nodes. Endocrine:   No malaise/lethargy, palpitations, polydipsia/polyuria, temperature intolerance or unexpected weight changes  Skin:  No rashes or non-healing ulcers. Physical Exam:  BP (!) 154/82   Pulse 77   Ht 4' 11\" (1.499 m)   Wt 175 lb 9.6 oz (79.7 kg)   SpO2 99%   BMI 35.47 kg/m²    General (appearance):  No acute distress  Eyes: anicteric   Neck: soft, No JVD  Ears/Nose/Mouth/Thorat: No cyanosis  CV: RRR   Respiratory:  Clear, normal effort  GI: soft, non-tender, non-distended  Skin: Warm, dry. No rashes  Neuro/Psych: Alert and oriented x 3. Appropriate behavior  Ext:  No c/c.  No edema  Pulses:  2+ radial and carotid Weight  Wt Readings from Last 3 Encounters:   05/26/21 175 lb 9.6 oz (79.7 kg)   03/29/21 175 lb (79.4 kg)   03/25/21 172 lb 6.4 oz (78.2 kg)          CBC:   Lab Results   Component Value Date    WBC 5.9 03/25/2021    HGB 13.2 03/25/2021    HCT 38.7 03/25/2021    MCV 98.0 03/25/2021     03/25/2021     BMP:  Lab Results   Component Value Date    CREATININE 0.8 03/25/2021    BUN 15 03/25/2021     (L) 03/25/2021    K 5.2 (H) 03/25/2021    CL 99 03/25/2021    CO2 28 03/25/2021     Mag:   Lab Results   Component Value Date    MG 2.00 03/25/2021     LIVER PROFILE:   Lab Results   Component Value Date    ALT 19 03/08/2021    AST 21 03/08/2021    ALKPHOS 61 03/08/2021    BILITOT <0.2 03/08/2021     PT/INR:   Lab Results   Component Value Date    INR 1.11 03/23/2021    INR 1.14 04/26/2019    INR 1.18 (H) 10/24/2016    PROTIME 12.9 03/23/2021    PROTIME 13.0 04/26/2019    PROTIME 13.5 (H) 10/24/2016     Pro-BNP No results found for: PROBNP  LIPIDS:  No components found for: CHLPL  Lab Results   Component Value Date    TRIG 131 03/08/2021    TRIG 116 07/13/2020    TRIG 135 07/03/2019     Lab Results   Component Value Date    HDL 74 (H) 03/08/2021    HDL 53 07/13/2020    HDL 77 (H) 07/03/2019     Lab Results   Component Value Date    LDLCALC 62 03/08/2021    LDLCALC 42 07/13/2020    LDLCALC 66 07/03/2019     Lab Results   Component Value Date    LABVLDL 26 03/08/2021    LABVLDL 23 07/13/2020    LABVLDL 27 07/03/2019     TSH:No results found for: TSH, O0NUMSY, S9LUCCH, THYROIDAB    IMAGING:     3/2021 ECG: Sinus, LAD, LBBB    3/15/2021 Nuc stress:      There is normal isotope uptake at stress and rest. There is no evidence of    myocardial ischemia or scar.    Normal LV size and systolic function.    Left ventricular ejection fraction of 65 %.    There are no regional wall motion abnormalities.    Overall findings represent a low risk scan. 2017 Echo:   Normal left ventricular size and wall thickness.  Low normal left ventricular   systolic function with an estimated ejection fraction of 50-55%. There is   abnormal septal motion, possibly due to abnormal interventricular   conduction. Trace tricuspid regurgitation. RVSP 32 mmHg. No significant valvular regurgitation or stenosis. Medications:   Current Outpatient Medications   Medication Sig Dispense Refill    glipiZIDE (GLUCOTROL XL) 5 MG extended release tablet Take 5 mg by mouth daily (with breakfast)      amLODIPine (NORVASC) 10 MG tablet Take 1 tablet by mouth daily 90 tablet 3    tamsulosin (FLOMAX) 0.4 MG capsule Take 1 capsule by mouth daily for 7 days 7 capsule 0    polyethylene glycol (MIRALAX) 17 g PACK packet Take 17 g by mouth daily      zolpidem (AMBIEN) 10 MG tablet       metroNIDAZOLE (FLAGYL) 500 MG tablet Take one tablet by mouth three on the day prior to surgery. Take one tablet at 1 pm, 2 pm and 9 pm. 3 tablet 0    acetaminophen (TYLENOL) 325 MG tablet Take 325 mg by mouth as needed      DULoxetine (CYMBALTA) 20 MG extended release capsule Take 20 mg by mouth daily      loperamide (RA ANTI-DIARRHEAL) 2 MG capsule Take 1 capsule by mouth 4 times daily as needed for Diarrhea 90 capsule 5    Multiple Vitamins-Minerals (MULTIVITAMIN PO) Take by mouth      LORazepam (ATIVAN) 0.5 MG tablet Take 1 tablet by mouth every 12 hours as needed (Patient taking differently: Take 0.5 mg by mouth 2 times daily. ) 60 tablet 0    lurasidone (LATUDA) 40 MG TABS tablet Take 1 tablet by mouth daily 30 tablet 0    atorvastatin (LIPITOR) 40 MG tablet Take 40 mg by mouth daily      albuterol sulfate  (90 BASE) MCG/ACT inhaler Inhale 2 puffs into the lungs every 6 hours as needed for Wheezing      diphenhydrAMINE (BENADRYL) 25 MG capsule Take 25-50 mg by mouth nightly as needed for Sleep       famotidine (PEPCID) 20 MG tablet Take 1 tablet by mouth 2 times daily 60 tablet 3     No current facility-administered medications for this visit. Assessment:  1. Essential hypertension    2. Mixed hyperlipidemia    3. LBBB (left bundle branch block)        Plan:  HTN; uncontrolled   Increase amlodipine to 10 mg po daily   Cont to monitor and call with readings   Goal SBP< 135 mmHg   Will start restart HCTZ bc of headaches   Will try chlorthalidone 25 mg po daily if needed.    HLD; stable   LDL 62   Cont lipitor  LBBB: stable   Has had LBBB for years     Follow up in 1 month     Reviewed most recent: CBC, BMP, LFT, Lipids, Mag, PT/INR,   Reviewed most recent: ECG, Echo, Nuc stress test,    Total time spent today for this encounter:  minutes

## 2021-07-12 ENCOUNTER — OFFICE VISIT (OUTPATIENT)
Dept: SURGERY | Age: 66
End: 2021-07-12
Payer: COMMERCIAL

## 2021-07-12 VITALS
TEMPERATURE: 98.4 F | HEART RATE: 82 BPM | DIASTOLIC BLOOD PRESSURE: 88 MMHG | HEIGHT: 59 IN | SYSTOLIC BLOOD PRESSURE: 151 MMHG | BODY MASS INDEX: 36.29 KG/M2 | WEIGHT: 180 LBS

## 2021-07-12 DIAGNOSIS — R10.33 PERIUMBILICAL PAIN: Primary | ICD-10-CM

## 2021-07-12 PROCEDURE — 3017F COLORECTAL CA SCREEN DOC REV: CPT | Performed by: SURGERY

## 2021-07-12 PROCEDURE — 1123F ACP DISCUSS/DSCN MKR DOCD: CPT | Performed by: SURGERY

## 2021-07-12 PROCEDURE — 4040F PNEUMOC VAC/ADMIN/RCVD: CPT | Performed by: SURGERY

## 2021-07-12 PROCEDURE — G8427 DOCREV CUR MEDS BY ELIG CLIN: HCPCS | Performed by: SURGERY

## 2021-07-12 PROCEDURE — G8417 CALC BMI ABV UP PARAM F/U: HCPCS | Performed by: SURGERY

## 2021-07-12 PROCEDURE — 99204 OFFICE O/P NEW MOD 45 MIN: CPT | Performed by: SURGERY

## 2021-07-12 PROCEDURE — G8399 PT W/DXA RESULTS DOCUMENT: HCPCS | Performed by: SURGERY

## 2021-07-12 PROCEDURE — 4004F PT TOBACCO SCREEN RCVD TLK: CPT | Performed by: SURGERY

## 2021-07-12 PROCEDURE — 1090F PRES/ABSN URINE INCON ASSESS: CPT | Performed by: SURGERY

## 2021-07-12 RX ORDER — LIRAGLUTIDE 6 MG/ML
INJECTION SUBCUTANEOUS
COMMUNITY
Start: 2021-06-01

## 2021-07-20 ENCOUNTER — HOSPITAL ENCOUNTER (OUTPATIENT)
Dept: CT IMAGING | Age: 66
Discharge: HOME | End: 2021-07-20
Payer: COMMERCIAL

## 2021-07-20 DIAGNOSIS — R10.33 PERIUMBILICAL PAIN: ICD-10-CM

## 2021-07-20 PROCEDURE — 74176 CT ABD & PELVIS W/O CONTRAST: CPT

## 2021-07-22 NOTE — PROGRESS NOTES
PATIENT NAME: Eric Parisi     YOB: 1955     TODAY'S DATE: 7/22/2021    Reason for Visit:  Incisional hernia     Requesting Physician:  Blaine Gregg    HISTORY OF PRESENT ILLNESS:              The patient is a 74 y.o. female with a PMHx as delineated below who presents with a bulge in the about her umbilicus that has been noted for some time. This has increased in size and more recently has been associated with discomfort with activities. No obstructive complaints.     Chief Complaint   Patient presents with    Follow-up     hernia       REVIEW OF SYSTEMS:  CONSTITUTIONAL:  negative  HEENT:  negative  RESPIRATORY:  negative  CARDIOVASCULAR:  negative  GASTROINTESTINAL:  negative except for abdominal pain  GENITOURINARY:  negative  HEMATOLOGIC/LYMPHATIC:  negative  MUSCULOSKELETAL: negative  NEUROLOGICAL:  negative    PMH  Past Medical History:   Diagnosis Date    Anxiety, generalized     CPAP (continuous positive airway pressure) dependence     Diabetes mellitus ([address]     no meds    Diverticulitis     H/O ischemic bowel disease 03/2016    Hepatitis C     History of blood transfusion     s/p hysterectomy    History of kidney stones     x 1 episode    Hyperlipidemia     LBBB (left bundle branch block)     Loose stools     secondary to history of colectomy    Paranoid schizophrenia ([address]     Proteinuria     history of    Rectal prolapse     Sciatica     Sleep apnea     USES CPAP    Sleep apnea     Wears partial dentures        PSH  Past Surgical History:   Procedure Laterality Date    ABDOMEN SURGERY  09/20/2016    OPEN ILEOSTOMY TAKEDOWN                   COLONOSCOPY      COLPOPEXY N/A 4/26/2019    OPEN RECTOPEXY, LYSIS OF ADHESIONS, INTRAOPERATIVE FLEXIBLE SIGMOIDOSCOPY performed by Maxim Seaman MD at [facility] Bilateral 4/26/2019    CYSTOSCOPY, BILATERAL URETERAL STENT INSERTION performed by Juan Diego Yepez MD at [facility] w/tubaligation    HYSTERECTOMY      PARTIAL VAGINAL    KIDNEY STONE SURGERY      KIDNEY STONE SURGERY      KNEE SURGERY      bilateral knee replacements    OTHER SURGICAL HISTORY  3/5/16    DIAGNOSTIC LAPAROSCOPY, SUBTOTAL COLECTOMY, COLOSTOMY,    [place] COLONOSCOPY FLX DX W/COLLJ SPEC WHEN PFRMD N/A 10/25/2018    COLONOSCOPY, POSSIBLE POLYPECTOMY WITH MAC performed by Janey Vallecillo MD at [facility] N/A 3/23/2021    EXAM UNDER ANESTHESIA, DELORME PROCEDURE performed by Janey Vallecillo MD at [address] EXTRACTION         Social History  Social History     Socioeconomic History    Marital status:      Spouse name: Not on file    Number of children: Not on file    Years of education: Not on file    Highest education level: Not on file   Occupational History    Not on file   Tobacco Use    Smoking status: Current Every Day Smoker     Packs/day: 0.50     Years: 20.00     Pack years: 10.00     Types: Cigarettes     Start date: 3/22/2001     Last attempt to quit: 2016     Years since quittin.4    Smokeless tobacco: Never Used   Vaping Use    Vaping Use: Never used   Substance and Sexual Activity    Alcohol use: No    Drug use: No    Sexual activity: Not Currently   Other Topics Concern    Not on file   Social History Narrative    Not on file     Social Determinants of Health     Financial Resource Strain:     Difficulty of Paying Living Expenses:    Food Insecurity:     Worried About Running Out of Food in the Last Year:     [address] in the Last Year:    Transportation Needs:     Lack of Transportation (Medical):      Lack of Transportation (Non-Medical):    Physical Activity:     Days of Exercise per Week:     Minutes of Exercise per Session:    Stress:     Feeling of Stress :    Social Connections:     Frequency of Communication with Friends and Family:     Frequency of Social Gatherings with Friends and Family:     Attends Congregational Services:     Active Member of Clubs or Organizations:     Attends Club or Organization Meetings:     Marital Status:    Intimate Partner Violence:     Fear of Current or Ex-Partner:     Emotionally Abused:     Physically Abused:     Sexually Abused:        Family History:   No family history on file. Allergy: No Known Allergies    PHYSICAL EXAM:  VITALS:  BP (!) 151/88   Pulse 82   Temp 98.4 °F (36.9 °C)   Ht 4' 11\" (1.499 m)   Wt 180 lb (81.6 kg)   BMI 36.36 kg/m²     CONSTITUTIONAL:  alert, no apparent distress and morbidly obese  EYES:  sclera clear  ENT:  normocepalic, without obvious abnormality  NECK:  supple, symmetrical, trachea midline and no carotid bruits  LUNGS:  clear to auscultation  CARDIOVASCULAR:  regular rate and rhythm and no murmur noted  ABDOMEN:  Scars noted, normal bowel sounds, soft, non-distended, non-tender, voluntary guarding absent, no masses palpated and hernia present at the umbilicus and above  MUSCULOSKELETAL:  0+ pitting edema lower extremities  NEUROLOGIC:  Mental Status Exam:  Level of Alertness:   awake  Orientation:   person, place, time  SKIN:  no bruising or bleeding and normal skin color, texture, turgor    IMPRESSION/RECOMMENDATIONS:    The patient has a clear ventral hernia but I am suspicious of multiple hernias. I will proceed with a CT scan to better define her abdominal wall. I will see her back in the office after this study.      Carlito Recio MD

## 2021-07-30 ENCOUNTER — TELEPHONE (OUTPATIENT)
Dept: SURGERY | Age: 66
End: 2021-07-30

## 2021-07-30 NOTE — TELEPHONE ENCOUNTER
Patient called in asking about surgery. Pateint had CT on 7/20. She would like to know if she will need to see a heart doctor to prep for surgery.     Please call patient: [phone]

## 2021-08-05 NOTE — TELEPHONE ENCOUNTER
Patient seen 7/12/21 with the below plan:   IMPRESSION/RECOMMENDATIONS:     The patient has a clear ventral hernia but I am suspicious of multiple hernias. I will proceed with a CT scan to better define her abdominal wall. I will see her back in the office after this study.   Bridgette Burnett MD    CT was completed on 7/20/21. Called patient to advice an OV appt will be needed. LM for patient to return office call to schedule a follow up.

## 2021-08-16 ENCOUNTER — OFFICE VISIT (OUTPATIENT)
Dept: SURGERY | Age: 66
End: 2021-08-16
Payer: COMMERCIAL

## 2021-08-16 VITALS
DIASTOLIC BLOOD PRESSURE: 87 MMHG | HEIGHT: 55 IN | BODY MASS INDEX: 41.01 KG/M2 | WEIGHT: 177.2 LBS | HEART RATE: 87 BPM | TEMPERATURE: 98.2 F | SYSTOLIC BLOOD PRESSURE: 141 MMHG

## 2021-08-16 DIAGNOSIS — K43.2 INCISIONAL HERNIA, WITHOUT OBSTRUCTION OR GANGRENE: Primary | ICD-10-CM

## 2021-08-16 PROCEDURE — G8399 PT W/DXA RESULTS DOCUMENT: HCPCS | Performed by: SURGERY

## 2021-08-16 PROCEDURE — 4004F PT TOBACCO SCREEN RCVD TLK: CPT | Performed by: SURGERY

## 2021-08-16 PROCEDURE — 99214 OFFICE O/P EST MOD 30 MIN: CPT | Performed by: SURGERY

## 2021-08-16 PROCEDURE — 4040F PNEUMOC VAC/ADMIN/RCVD: CPT | Performed by: SURGERY

## 2021-08-16 PROCEDURE — 1090F PRES/ABSN URINE INCON ASSESS: CPT | Performed by: SURGERY

## 2021-08-16 PROCEDURE — 1123F ACP DISCUSS/DSCN MKR DOCD: CPT | Performed by: SURGERY

## 2021-08-16 PROCEDURE — G8417 CALC BMI ABV UP PARAM F/U: HCPCS | Performed by: SURGERY

## 2021-08-16 PROCEDURE — 3017F COLORECTAL CA SCREEN DOC REV: CPT | Performed by: SURGERY

## 2021-08-16 PROCEDURE — G8427 DOCREV CUR MEDS BY ELIG CLIN: HCPCS | Performed by: SURGERY

## 2021-08-16 NOTE — LETTER
[facility]          ADDENDUM  [address] [address] [facility]  Phone: [phone] Fax: [phone]            Surgery Order/Time:  21/2:56 PM  Conf. # _________________  Scheduled by: Rocio Mcmahon Lpn                               **Pre-Surgical Orders in [facility]**  Facility: [facility]    Surgery Date: 2021 Time: 1130am    Pt arrival: [facility] Surgeon: N/A        Patient Name: Vivienne Anderson  : 1955  Home 51.36.52 (home)  [id number] [facility] [address]  [address] [place] [id number]  PCP:  JEVON Shabazz CNP   Does patient speak English?: yes    needed? no                                             PROCEDURE: Open incisional hernia repair  CPT: 64576: Repair, initial incisional or ventral hernia (Open)    DIAGNOSIS:      ICD-10-CM    1. Incisional hernia, without obstruction or gangrene  K43.2        Anesthesia: General  Time Needed:  2.5 hours   Pt Position:  supine      23 hour Observation  Cardiac Clearance: yes  Patient to meet with Anesthesiology prior to surgery: no    Patient Allergies: No Known Allergies  Pre-Op H&P to be done by: JEVON Shabazz CNP  Pre-Op Antibiotics: Ancef: 2g IV On Call to OR      Physician: Shayy Clarke MD Date: 21             Insurance:     ID #      Ph #   Date called ________________   Cornelia Sic to: _____________ Precert Needed?  Yes  /  No  PreAuth # & Details   ______________________________________________________________________

## 2021-08-17 ENCOUNTER — TELEPHONE (OUTPATIENT)
Dept: SURGERY | Age: 66
End: 2021-08-17

## 2021-08-17 NOTE — TELEPHONE ENCOUNTER
Pt seen on 8/16/21 surgery letter received Open incisional hernia repair 2.5hr OR time needed under general     Covid vaccinated    Pre op paperwork reviewed including the need for H&P with a EXG.    Pre op packet mailed    Post op appt scheduled    Routing to scheduling  Placed on calender and [place]

## 2021-08-18 ENCOUNTER — OFFICE VISIT (OUTPATIENT)
Dept: CARDIOLOGY CLINIC | Age: 66
End: 2021-08-18
Payer: COMMERCIAL

## 2021-08-18 VITALS
SYSTOLIC BLOOD PRESSURE: 144 MMHG | DIASTOLIC BLOOD PRESSURE: 78 MMHG | WEIGHT: 179.2 LBS | BODY MASS INDEX: 36.12 KG/M2 | HEIGHT: 59 IN | HEART RATE: 83 BPM

## 2021-08-18 DIAGNOSIS — I44.7 LBBB (LEFT BUNDLE BRANCH BLOCK): ICD-10-CM

## 2021-08-18 DIAGNOSIS — E11.9 TYPE 2 DIABETES MELLITUS WITHOUT COMPLICATION, WITHOUT LONG-TERM CURRENT USE OF INSULIN (HCC): ICD-10-CM

## 2021-08-18 DIAGNOSIS — I10 ESSENTIAL HYPERTENSION: ICD-10-CM

## 2021-08-18 DIAGNOSIS — F17.200 SMOKER: ICD-10-CM

## 2021-08-18 DIAGNOSIS — Z01.818 PRE-OP EXAM: Primary | ICD-10-CM

## 2021-08-18 DIAGNOSIS — E78.2 MIXED HYPERLIPIDEMIA: ICD-10-CM

## 2021-08-18 PROCEDURE — G8427 DOCREV CUR MEDS BY ELIG CLIN: HCPCS | Performed by: INTERNAL MEDICINE

## 2021-08-18 PROCEDURE — 99214 OFFICE O/P EST MOD 30 MIN: CPT | Performed by: INTERNAL MEDICINE

## 2021-08-18 PROCEDURE — 93000 ELECTROCARDIOGRAM COMPLETE: CPT | Performed by: INTERNAL MEDICINE

## 2021-08-18 PROCEDURE — G8417 CALC BMI ABV UP PARAM F/U: HCPCS | Performed by: INTERNAL MEDICINE

## 2021-08-18 PROCEDURE — 4040F PNEUMOC VAC/ADMIN/RCVD: CPT | Performed by: INTERNAL MEDICINE

## 2021-08-18 PROCEDURE — 4004F PT TOBACCO SCREEN RCVD TLK: CPT | Performed by: INTERNAL MEDICINE

## 2021-08-18 PROCEDURE — 1123F ACP DISCUSS/DSCN MKR DOCD: CPT | Performed by: INTERNAL MEDICINE

## 2021-08-18 PROCEDURE — 3017F COLORECTAL CA SCREEN DOC REV: CPT | Performed by: INTERNAL MEDICINE

## 2021-08-18 PROCEDURE — 3046F HEMOGLOBIN A1C LEVEL >9.0%: CPT | Performed by: INTERNAL MEDICINE

## 2021-08-18 PROCEDURE — 1090F PRES/ABSN URINE INCON ASSESS: CPT | Performed by: INTERNAL MEDICINE

## 2021-08-18 PROCEDURE — G8399 PT W/DXA RESULTS DOCUMENT: HCPCS | Performed by: INTERNAL MEDICINE

## 2021-08-18 PROCEDURE — 2022F DILAT RTA XM EVC RTNOPTHY: CPT | Performed by: INTERNAL MEDICINE

## 2021-08-18 NOTE — PROGRESS NOTES
74 y.o. here for preop hernia repair. No cp. No sob. No n/v/LH/dizziness. No syncope. No new symptoms compared to last visit. Still smoking 1/4 ppd. No cough. Can still walk 1-2 blocks. Can walk a full flight w/o problems. Dr. [employer] fixing hernia for September.     Past Medical History:   Diagnosis Date    Anxiety, generalized     CPAP (continuous positive airway pressure) dependence     Diabetes mellitus (HCC)     no meds    Diverticulitis     H/O ischemic bowel disease 03/2016    Hepatitis C     History of blood transfusion     s/p hysterectomy    History of kidney stones     x 1 episode    Hyperlipidemia     LBBB (left bundle branch block)     Loose stools     secondary to history of colectomy    Paranoid schizophrenia ([facility] [address]     Proteinuria     history of    Rectal prolapse     Sciatica     Sleep apnea     USES CPAP    Sleep apnea     Wears partial dentures      Past Surgical History:   Procedure Laterality Date    ABDOMEN SURGERY  09/20/2016    OPEN ILEOSTOMY TAKEDOWN                   COLONOSCOPY      COLPOPEXY N/A 4/26/2019    OPEN RECTOPEXY, LYSIS OF ADHESIONS, INTRAOPERATIVE FLEXIBLE SIGMOIDOSCOPY performed by Shannon Buchanan MD at [address] Bilateral 4/26/2019    CYSTOSCOPY, BILATERAL URETERAL STENT INSERTION performed by Jefferson Mejia MD at [address]      w/tubaligation    HYSTERECTOMY      PARTIAL VAGINAL    [place]      bilateral knee replacements    OTHER SURGICAL HISTORY  3/5/16    DIAGNOSTIC LAPAROSCOPY, SUBTOTAL COLECTOMY, COLOSTOMY,    [place] COLONOSCOPY FLX DX W/COLLJ SPEC WHEN PFRMD N/A 10/25/2018    COLONOSCOPY, POSSIBLE POLYPECTOMY WITH MAC performed by Lilibeth Horta MD at [address] [place] [address] N/A 3/23/2021    EXAM UNDER ANESTHESIA, DELORME PROCEDURE performed by Lilibeth Horta MD at [place] [address] History Tobacco Use    Smoking status: Current Every Day Smoker     Packs/day: 0.50     Years: 20.00     Pack years: 10.00     Types: Cigarettes     Start date: 3/22/2001     Last attempt to quit: 2016     Years since quittin.4    Smokeless tobacco: Never Used   Vaping Use    Vaping Use: Never used   Substance Use Topics    Alcohol use: No    Drug use: No     No Known Allergies    No family history on file. Review of Systems   General: + night sweats. HEENT: No blurry or decreased vision. No changes in hearing, nasal discharge or sore throat. Cardiovascular: See HPI. No cramping in legs or buttocks when walking. Respiratory: No cough, hemoptysis, or wheezing. No history of asthma. Gastrointestinal: Rectal prolapse. Genito-Urinary: No dysuria or hematuria. No urgency or polyuria. Musculoskeletal: No complaints of joint pain, joint swelling or muscular weakness/soreness. Neurological: No dizziness or headaches. No numbness/tingling, speech problems or weakness. No history of a stroke or TIA. Psychological: Schizophrenia in   Hematological and Lymphatic: No abnormal bleeding or bruising, blood clots, jaundice. Endocrine: No malaise/lethargy, palpitations, polydipsia/polyuria, temperature intolerance or unexpected weight changes. Skin: No rashes or non-healing ulcers. PE:  Blood pressure (!) 144/78, pulse 83, height 4' 11\" (1.499 m), weight 179 lb 3.2 oz (81.3 kg), not currently breastfeeding. General (appearance): Well devel. No distress. Eyes: Anicteric. EOMI. Neck: supple. No jvd  Ears/Nose/Mouth/Thorat: No cyanosis  CV: RRR. No m/r/g   Respiratory:  Clear B, normal respiratory effort  GI: abd s/nt/nd  Skin: Warm, dry. No rashes  Neuro/Psych: Alert and oriented x 3. Appropriate behavior  Ext:  No c/c.  No edema  Pulses:  2+ radial      Lab Results   Component Value Date    WBC 5.9 2021    HGB 13.2 2021    HCT 38.7 2021    MCV 98.0 2021     03/25/2021       Chemistry        Component Value Date/Time     (L) 03/25/2021 0516    K 5.2 (H) 03/25/2021 0516    CL 99 03/25/2021 0516    CO2 28 03/25/2021 0516    BUN 15 03/25/2021 0516    CREATININE 0.8 03/25/2021 0516        Component Value Date/Time    CALCIUM 9.5 03/25/2021 0516    ALKPHOS 61 03/08/2021 0837    AST 21 03/08/2021 0837    ALT 19 03/08/2021 0837    BILITOT <0.2 03/08/2021 0837        Lab Results   Component Value Date    INR 1.11 03/23/2021    INR 1.14 04/26/2019    INR 1.18 (H) 10/24/2016    PROTIME 12.9 03/23/2021    PROTIME 13.0 04/26/2019    PROTIME 13.5 (H) 10/24/2016     Lab Results   Component Value Date    CHOL 162 03/08/2021    CHOL 118 07/13/2020    CHOL 170 07/03/2019     Lab Results   Component Value Date    TRIG 131 03/08/2021    TRIG 116 07/13/2020    TRIG 135 07/03/2019     Lab Results   Component Value Date    HDL 74 (H) 03/08/2021    HDL 53 07/13/2020    HDL 77 (H) 07/03/2019     Lab Results   Component Value Date    LDLCALC 62 03/08/2021    LDLCALC 42 07/13/2020    LDLCALC 66 07/03/2019     Lab Results   Component Value Date    LABVLDL 26 03/08/2021    LABVLDL 23 07/13/2020    LABVLDL 27 07/03/2019     No results found for: CHOLHDLRATIO    ECG NSR with LBBB    5/2017 TTE: EF 50-55%.  Tr TR with RVSP 32.    9/19/2016 Nuc stress: Normal    9/15/16 ECG: NSR with frequent PAC/ectopic atrial pacemaker, LAD, LBBB    3/2021 Nuc Stress: No ischemia      Current Outpatient Medications:     VICTOZA 18 MG/3ML SOPN SC injection, INJECT 0.6MG MG SUBCUTANEOUSLY ONCE DAILY, Disp: , Rfl:     glipiZIDE (GLUCOTROL XL) 5 MG extended release tablet, Take 5 mg by mouth daily (with breakfast), Disp: , Rfl:     amLODIPine (NORVASC) 10 MG tablet, Take 1 tablet by mouth daily, Disp: 90 tablet, Rfl: 3    polyethylene glycol (MIRALAX) 17 g PACK packet, Take 17 g by mouth daily, Disp: , Rfl:     zolpidem (AMBIEN) 10 MG tablet, , Disp: , Rfl:     acetaminophen (TYLENOL) 325 MG tablet, Take 325 mg by mouth as needed, Disp: , Rfl:     DULoxetine (CYMBALTA) 20 MG extended release capsule, Take 20 mg by mouth daily, Disp: , Rfl:     loperamide (RA ANTI-DIARRHEAL) 2 MG capsule, Take 1 capsule by mouth 4 times daily as needed for Diarrhea, Disp: 90 capsule, Rfl: 5    Multiple Vitamins-Minerals (MULTIVITAMIN PO), Take by mouth, Disp: , Rfl:     LORazepam (ATIVAN) 0.5 MG tablet, Take 1 tablet by mouth every 12 hours as needed (Patient taking differently: Take 0.5 mg by mouth 2 times daily. ), Disp: 60 tablet, Rfl: 0    lurasidone (LATUDA) 40 MG TABS tablet, Take 1 tablet by mouth daily, Disp: 30 tablet, Rfl: 0    famotidine (PEPCID) 20 MG tablet, Take 1 tablet by mouth 2 times daily, Disp: 60 tablet, Rfl: 3    atorvastatin (LIPITOR) 40 MG tablet, Take 40 mg by mouth daily, Disp: , Rfl:     albuterol sulfate  (90 BASE) MCG/ACT inhaler, Inhale 2 puffs into the lungs every 6 hours as needed for Wheezing, Disp: , Rfl:     diphenhydrAMINE (BENADRYL) 25 MG capsule, Take 25-50 mg by mouth nightly as needed for Sleep , Disp: , Rfl:     tamsulosin (FLOMAX) 0.4 MG capsule, Take 1 capsule by mouth daily for 7 days, Disp: 7 capsule, Rfl: 0    A/P:  74 y.o. woman, cont to smoke, has DM, HTN, and hyperlipidemia. Here for per-op hernia repair. 1. Pre-op exam    2. LBBB (left bundle branch block)    3. Mixed hyperlipidemia    4. Essential hypertension    5. Smoker    6. Type 2 diabetes mellitus without complication, without long-term current use of insulin (HCC)      Recs:  -Cont DM Meds  -Amlodipine, statin  -Had recent neg stress test; no further w/u necessary  -No smoking  -F/U in [facility].  Ashwin Bose MD, [facility] [place], [place]

## 2021-08-20 NOTE — PROGRESS NOTES
PATIENT NAME: Manish Maradiaga     YOB: 1955     TODAY'S DATE: 8/20/2021    Reason for Visit:  Incisional hernia     Requesting Physician:  Luciana Camacho    HISTORY OF PRESENT ILLNESS:              The patient is a 74 y.o. female with a PMHx as delineated below who presents with a bulge in the about her umbilicus that has been noted for some time. This has increased in size and more recently has been associated with discomfort with activities. No obstructive complaints.     Chief Complaint   Patient presents with    Follow-up     discuss CT results 7/20/21       REVIEW OF SYSTEMS:  CONSTITUTIONAL:  negative  HEENT:  negative  RESPIRATORY:  negative  CARDIOVASCULAR:  negative  GASTROINTESTINAL:  negative except for abdominal pain  GENITOURINARY:  negative  HEMATOLOGIC/LYMPHATIC:  negative  MUSCULOSKELETAL: negative  NEUROLOGICAL:  negative    PMH  Past Medical History:   Diagnosis Date    Anxiety, generalized     CPAP (continuous positive airway pressure) dependence     Diabetes mellitus ([address]     no meds    Diverticulitis     H/O ischemic bowel disease 03/2016    Hepatitis C     History of blood transfusion     s/p hysterectomy    History of kidney stones     x 1 episode    Hyperlipidemia     LBBB (left bundle branch block)     Loose stools     secondary to history of colectomy    Paranoid schizophrenia ([address]     Proteinuria     history of    Rectal prolapse     Sciatica     Sleep apnea     USES CPAP    Sleep apnea     Wears partial dentures        PSH  Past Surgical History:   Procedure Laterality Date    ABDOMEN SURGERY  09/20/2016    OPEN ILEOSTOMY TAKEDOWN                   COLONOSCOPY      COLPOPEXY N/A 4/26/2019    OPEN RECTOPEXY, LYSIS OF ADHESIONS, INTRAOPERATIVE FLEXIBLE SIGMOIDOSCOPY performed by Prakash Goodman MD at [facility] 224 Bilateral 4/26/2019    CYSTOSCOPY, BILATERAL URETERAL STENT INSERTION performed by Katherine Friedman MD at [facility] CURETTAGE OF UTERUS      w/tubaligation    HYSTERECTOMY      PARTIAL VAGINAL    KIDNEY STONE SURGERY      KIDNEY STONE SURGERY      KNEE SURGERY      bilateral knee replacements    OTHER SURGICAL HISTORY  3/5/16    DIAGNOSTIC LAPAROSCOPY, SUBTOTAL COLECTOMY, COLOSTOMY,    AL COLONOSCOPY FLX DX W/COLLJ SPEC WHEN PFRMD N/A 10/25/2018    COLONOSCOPY, POSSIBLE POLYPECTOMY WITH MAC performed by Miguel A Franks MD at [facility] N/A 3/23/2021    EXAM UNDER ANESTHESIA, DELORME PROCEDURE performed by Miguel A Franks MD at [address] EXTRACTION         Social History  Social History     Socioeconomic History    Marital status:      Spouse name: Not on file    Number of children: Not on file    Years of education: Not on file    Highest education level: Not on file   Occupational History    Not on file   Tobacco Use    Smoking status: Current Every Day Smoker     Packs/day: 0.50     Years: 20.00     Pack years: 10.00     Types: Cigarettes     Start date: 3/22/2001     Last attempt to quit: 2016     Years since quittin.4    Smokeless tobacco: Never Used   Vaping Use    Vaping Use: Never used   Substance and Sexual Activity    Alcohol use: No    Drug use: No    Sexual activity: Not Currently   Other Topics Concern    Not on file   Social History Narrative    Not on file     Social Determinants of Health     Financial Resource Strain:     Difficulty of Paying Living Expenses:    Food Insecurity:     Worried About Running Out of Food in the Last Year:     [address] in the Last Year:    Transportation Needs:     Lack of Transportation (Medical):      Lack of Transportation (Non-Medical):    Physical Activity:     Days of Exercise per Week:     Minutes of Exercise per Session:    Stress:     Feeling of Stress :    Social Connections:     Frequency of Communication with Friends and Family:     Frequency of Social Gatherings with Friends and Family:     Attends L[employer] Services:     Active Member of Clubs or Organizations:     Attends Club or Organization Meetings:     Marital Status:    Intimate Partner Violence:     Fear of Current or Ex-Partner:     Emotionally Abused:     Physically Abused:     Sexually Abused:        Family History:   No family history on file. Allergy: No Known Allergies    PHYSICAL EXAM:  VITALS:  BP (!) 141/87   Pulse 87   Temp 98.2 °F (36.8 °C)   Ht 4' (1.219 m)   Wt 177 lb 3.2 oz (80.4 kg)   BMI 54.07 kg/m²     CONSTITUTIONAL:  alert, no apparent distress and morbidly obese  EYES:  sclera clear  ENT:  normocepalic, without obvious abnormality  NECK:  supple, symmetrical, trachea midline and no carotid bruits  LUNGS:  clear to auscultation  CARDIOVASCULAR:  regular rate and rhythm and no murmur noted  ABDOMEN:  Scars noted, normal bowel sounds, soft, non-distended, non-tender, voluntary guarding absent, no masses palpated and hernia present at the umbilicus and above  MUSCULOSKELETAL:  0+ pitting edema lower extremities  NEUROLOGIC:  Mental Status Exam:  Level of Alertness:   awake  Orientation:   person, place, time  SKIN:  no bruising or bleeding and normal skin color, texture, turgor    Radiology review:  1. Complex multiple abdominal wall fenestrations and hernia defect containing omental fat which extend at least 8 cm superior to the umbilicus predominantly in the midline or slightly parasagittal.   2. Larger umbilical hernia defect containing sigmoid colon, no evidence of obstruction   3. Lumbar spondylosis   4. Peripheral pleural and subpleural septal thickening, parenchymal scarring   5. Nonobstructive right renal nephrolithiasis.      6. Small hiatal hernia   7. In the region of prior right rectus ostomy, no hernia is identified       IMPRESSION/RECOMMENDATIONS:    Patient with a symptomatic incisional hernia for which I have recommended repair.  We discussed the surgical options and have elected to proceed with open repair. We discussed the risk, benefits, and expected recovery from surgery and she wishes to proceed.       Erika Gallego MD

## 2021-08-30 NOTE — PROGRESS NOTES
[facility] patients having surgery or anesthesia are required to be Covid tested OR to have been vaccinated at least 14 days prior to your procedure. It is very important to return our call to [phone] and notify the staff of your last vaccination date otherwise you will be required to complete Covid PCR test within the 5-6 days prior to surgery & quarantine. The results will need to be faxed to PreAdmission Testing at [phone]. PRIOR TO PROCEDURE DATE:        1. PLEASE FOLLOW ANY  GUIDELINES/ INSTRUCTIONS PRIOR TO YOUR PROCEDURE AS ADVISED BY YOUR SURGEON. 2. Arrange for someone to drive you home and be with you for the first 24 hours after discharge for your safety after your procedure for which you received sedation. Ensure it is someone we can share information with regarding your discharge. 3. You must contact your surgeon for instructions IF:   You are taking any blood thinners, aspirin, anti-inflammatory or vitamin E.   There is a change in your physical condition such as a cold, fever, rash, cuts, sores or any other infection, especially near your surgical site. 4. Do not drink alcohol the day before or day of your procedure. 5. A Pre-op History and Physical for surgery MUST be completed by your Physician or Urgent Care within 30 days of your procedure date. Please bring a copy with you on the day of your procedure and along with any other testing performed. THE DAY OF YOUR PROCEDURE:  1. Follow instructions for ARRIVAL TIME as DIRECTED BY YOUR SURGEON. 2. Enter the MAIN entrance from [address] [place] [address] and follow the signs to the free [employer] or [employer] parking (offered free of charge 6am-5pm). 3. Enter the Main Entrance of the hospital (do not enter from the lower level of the parking garage). Upon entrance, check in with the  at the main desk on your left. If no one is available at the desk, proceed into the [facility] Waiting Room and go through the door directly into the [facility]. There is a Check-in desk ACROSS from Room 5 (marked with a sign hanging from the ceiling). The phone number for the surgery center is [phone]. 4. Please call [phone] option #2 option #2 if you have not been preregistered yet. On the day of your procedure bring your insurance card and photo ID. You will be registered at your bedside once brought back to your room. 5. DO NOT EAT ANYTHING eight hours prior to your arrival for surgery. May have 8 ounces of water 4 hours prior to your arrival for surgery. NOTE: ALL Gastric, Bariatric and Bowel surgery patients MUST follow their surgeon's instructions. 6. MEDICATIONS    Take the following medications with a SMALL sip of water: amlodipine   Bariatric patient's call surgeon if on diabetic medications as some need to be stopped 1 week preop   Use your usual dose of inhalers the morning of surgery. BRING your rescue inhaler with you to hospital.    Anesthesia does NOT want you to take insulin the morning of surgery. They will control your blood sugar while you are at the hospital. Please contact your ordering physician for instructions regarding your insulin the night before your procedure. If you have an insulin pump, please keep it set on basal rate. 7. Do not swallow water when brushing teeth. No gum, candy, mints or ice chips. Refrain from smoking or at least decrease the amount. 8. Dress in loose, comfortable clothing appropriate for redressing after your procedure. Do not wear jewelry (including body piercings), make-up (especially NO eye make-up), fingernail polish (NO toenail polish if foot/leg surgery), lotion, powders or metal hairclips. 9. Dentures, glasses, or contacts will need to be removed before your procedure.  Bring cases for your glasses, contacts, dentures, or hearing aids to protect them while you are in surgery. 10. If you use a CPAP, please bring it with you on the day of your procedure. 11. We recommend that valuable personal  belongings such as cash, cell phones, e-tablets or jewelry, be left at home during your stay. The hospital will not be responsible for valuables that are not secured in the hospital safe. However, if your insurance requires a co-pay, you may want to bring a method of payment, i.e. Check or credit card, if you wish to pay your co-pay the day of surgery. 12. If you are to stay overnight, you may bring a bag with personal items. Please have any large items you may need brought in by your family after your arrival to your hospital room. 15. If you have a Living Will or Durable Power of , please bring a copy on the day of your procedure. 15. With your permission, one family member may accompany you while you are being prepared for surgery. Once you are ready, additional family members may join you. HOW WE KEEP YOU SAFE and WORK TO PREVENT SURGICAL SITE INFECTIONS:  1. Health care workers should always check your ID bracelet to verify your name and birth date. You will be asked many times to state your name, date of birth, and allergies. 2. Health care workers should always clean their hands with soap or alcohol gel before providing care to you. It is okay to ask anyone if they cleaned their hands before they touch you. 3. You will be actively involved in verifying the type of procedure you are having and ensuring the correct surgical site. This will be confirmed multiple times prior to your procedure. Do NOT haydee your surgery site UNLESS instructed to by your surgeon. 4. Do not shave or wax for 72 hours prior to procedure near your operative site. Shaving with a razor can irritate your skin and make it easier to develop an infection.  On the day of your procedure, any hair that needs to be removed near the surgical site will be clipped by a healthcare worker using a special clippers designed to avoid skin irritation. 5. When you are in the operating room, your surgical site will be cleansed with a special soap, and in most cases, you will be given an antibiotic before the surgery begins. What to expect AFTER YOUR PROCEDURE:  1. Immediately following your procedure, your will be taken to the PACU for the first phase of your recovery. Your nurse will help you recover from any potential side effects of anesthesia, such as extreme drowsiness, changes in your vital signs or breathing patterns. Nausea, headache, muscle aches, or sore throat may also occur after anesthesia. Your nurse will help you manage these potential side effects. 2. For comfort and safety, arrange to have someone at home with you for the first 24 hours after discharge. 3. You and your family will be given written instructions about your diet, activity, dressing care, medications, and return visits. 4. Once at home, should issues with nausea, pain, or bleeding occur, or should you notice any signs of infection, you should call your surgeon. 5. Always clean your hands before and after caring for your wound. Do not let your family touch your surgery site without cleaning their hands. 6. Narcotic pain medications can cause significant constipation. You may want to add a stool softener to your postoperative medication schedule or speak to your surgeon on how best to manage this SIDE EFFECT. SPECIAL INSTRUCTIONS  Thank you for allowing us to care for you. We strive to exceed your expectations in the delivery of care and service provided to you and your family. If you need to contact the [place] [address] staff for any reason, please call us at [phone]    Instructions reviewed with patient during preadmission testing phone interview.   Radha Calle RN.8/30/2021 .9:03 AM      ADDITIONAL EDUCATIONAL INFORMATION REVIEWED PER PHONE WITH YOU AND/OR YOUR FAMILY:    Yes Antibacterial Soap

## 2021-08-31 ENCOUNTER — ANESTHESIA EVENT (OUTPATIENT)
Dept: OPERATING ROOM | Age: 66
DRG: 355 | End: 2021-08-31
Payer: COMMERCIAL

## 2021-09-01 ENCOUNTER — ANESTHESIA (OUTPATIENT)
Dept: OPERATING ROOM | Age: 66
DRG: 355 | End: 2021-09-01
Payer: COMMERCIAL

## 2021-09-01 ENCOUNTER — HOSPITAL ENCOUNTER (INPATIENT)
Age: 66
LOS: 1 days | Discharge: HOME | DRG: 355 | End: 2021-09-05
Attending: SURGERY | Admitting: SURGERY
Payer: COMMERCIAL

## 2021-09-01 VITALS — SYSTOLIC BLOOD PRESSURE: 143 MMHG | OXYGEN SATURATION: 100 % | DIASTOLIC BLOOD PRESSURE: 74 MMHG | TEMPERATURE: 97.7 F

## 2021-09-01 DIAGNOSIS — K43.2 INCISIONAL HERNIA WITHOUT OBSTRUCTION OR GANGRENE: ICD-10-CM

## 2021-09-01 DIAGNOSIS — Z87.19 S/P HERNIA REPAIR: Primary | ICD-10-CM

## 2021-09-01 DIAGNOSIS — Z98.890 S/P HERNIA REPAIR: Primary | ICD-10-CM

## 2021-09-01 LAB
GLUCOSE BLD-MCNC: 150 MG/DL (ref 70–99)
GLUCOSE BLD-MCNC: 195 MG/DL (ref 70–99)
GLUCOSE BLD-MCNC: 212 MG/DL (ref 70–99)
GLUCOSE BLD-MCNC: 243 MG/DL (ref 70–99)
PERFORMED ON: ABNORMAL

## 2021-09-01 PROCEDURE — 2580000003 HC RX 258: Performed by: STUDENT IN AN ORGANIZED HEALTH CARE EDUCATION/TRAINING PROGRAM

## 2021-09-01 PROCEDURE — 6360000002 HC RX W HCPCS: Performed by: ANESTHESIOLOGY

## 2021-09-01 PROCEDURE — 2580000003 HC RX 258: Performed by: NURSE ANESTHETIST, CERTIFIED REGISTERED

## 2021-09-01 PROCEDURE — 6370000000 HC RX 637 (ALT 250 FOR IP): Performed by: STUDENT IN AN ORGANIZED HEALTH CARE EDUCATION/TRAINING PROGRAM

## 2021-09-01 PROCEDURE — 6360000002 HC RX W HCPCS: Performed by: STUDENT IN AN ORGANIZED HEALTH CARE EDUCATION/TRAINING PROGRAM

## 2021-09-01 PROCEDURE — 3700000001 HC ADD 15 MINUTES (ANESTHESIA): Performed by: SURGERY

## 2021-09-01 PROCEDURE — 2500000003 HC RX 250 WO HCPCS: Performed by: NURSE ANESTHETIST, CERTIFIED REGISTERED

## 2021-09-01 PROCEDURE — 88302 TISSUE EXAM BY PATHOLOGIST: CPT

## 2021-09-01 PROCEDURE — 3700000000 HC ANESTHESIA ATTENDED CARE: Performed by: SURGERY

## 2021-09-01 PROCEDURE — 6360000002 HC RX W HCPCS: Performed by: SURGERY

## 2021-09-01 PROCEDURE — 2580000003 HC RX 258: Performed by: ANESTHESIOLOGY

## 2021-09-01 PROCEDURE — 2709999900 HC NON-CHARGEABLE SUPPLY: Performed by: SURGERY

## 2021-09-01 PROCEDURE — 6360000002 HC RX W HCPCS: Performed by: NURSE ANESTHETIST, CERTIFIED REGISTERED

## 2021-09-01 PROCEDURE — 7100000001 HC PACU RECOVERY - ADDTL 15 MIN: Performed by: SURGERY

## 2021-09-01 PROCEDURE — C1781 MESH (IMPLANTABLE): HCPCS | Performed by: SURGERY

## 2021-09-01 PROCEDURE — 2580000003 HC RX 258: Performed by: SURGERY

## 2021-09-01 PROCEDURE — 2700000000 HC OXYGEN THERAPY PER DAY

## 2021-09-01 PROCEDURE — 3600000014 HC SURGERY LEVEL 4 ADDTL 15MIN: Performed by: SURGERY

## 2021-09-01 PROCEDURE — 0WUF0JZ SUPPLEMENT ABDOMINAL WALL WITH SYNTHETIC SUBSTITUTE, OPEN APPROACH: ICD-10-PCS | Performed by: SURGERY

## 2021-09-01 PROCEDURE — G0378 HOSPITAL OBSERVATION PER HR: HCPCS

## 2021-09-01 PROCEDURE — 2720000010 HC SURG SUPPLY STERILE: Performed by: SURGERY

## 2021-09-01 PROCEDURE — 7100000000 HC PACU RECOVERY - FIRST 15 MIN: Performed by: SURGERY

## 2021-09-01 PROCEDURE — 3600000004 HC SURGERY LEVEL 4 BASE: Performed by: SURGERY

## 2021-09-01 PROCEDURE — 94761 N-INVAS EAR/PLS OXIMETRY MLT: CPT

## 2021-09-01 PROCEDURE — L0450 TLSO FLEX TRUNK/THOR PRE OTS: HCPCS | Performed by: SURGERY

## 2021-09-01 DEVICE — MESH HERN W12XL12IN INGUINAL POLYPR SQ L PORE MFIL SF: Type: IMPLANTABLE DEVICE | Site: GROIN | Status: FUNCTIONAL

## 2021-09-01 RX ORDER — MAGNESIUM HYDROXIDE 1200 MG/15ML
LIQUID ORAL CONTINUOUS PRN
Status: COMPLETED | OUTPATIENT
Start: 2021-09-01 | End: 2021-09-01

## 2021-09-01 RX ORDER — SODIUM CHLORIDE 0.9 % (FLUSH) 0.9 %
5-40 SYRINGE (ML) INJECTION EVERY 12 HOURS SCHEDULED
Status: DISCONTINUED | OUTPATIENT
Start: 2021-09-01 | End: 2021-09-05 | Stop reason: HOSPADM

## 2021-09-01 RX ORDER — SODIUM CHLORIDE 9 MG/ML
25 INJECTION, SOLUTION INTRAVENOUS PRN
Status: DISCONTINUED | OUTPATIENT
Start: 2021-09-01 | End: 2021-09-01 | Stop reason: HOSPADM

## 2021-09-01 RX ORDER — ONDANSETRON 4 MG/1
4 TABLET, ORALLY DISINTEGRATING ORAL EVERY 8 HOURS PRN
Status: DISCONTINUED | OUTPATIENT
Start: 2021-09-01 | End: 2021-09-05 | Stop reason: HOSPADM

## 2021-09-01 RX ORDER — SUCCINYLCHOLINE CHLORIDE 20 MG/ML
INJECTION INTRAMUSCULAR; INTRAVENOUS PRN
Status: DISCONTINUED | OUTPATIENT
Start: 2021-09-01 | End: 2021-09-01 | Stop reason: SDUPTHER

## 2021-09-01 RX ORDER — LABETALOL HYDROCHLORIDE 5 MG/ML
5 INJECTION, SOLUTION INTRAVENOUS EVERY 10 MIN PRN
Status: DISCONTINUED | OUTPATIENT
Start: 2021-09-01 | End: 2021-09-01 | Stop reason: HOSPADM

## 2021-09-01 RX ORDER — ONDANSETRON 2 MG/ML
INJECTION INTRAMUSCULAR; INTRAVENOUS PRN
Status: DISCONTINUED | OUTPATIENT
Start: 2021-09-01 | End: 2021-09-01 | Stop reason: SDUPTHER

## 2021-09-01 RX ORDER — LORAZEPAM 2 MG/ML
0.5 INJECTION INTRAMUSCULAR EVERY 6 HOURS PRN
Status: CANCELLED | OUTPATIENT
Start: 2021-09-01

## 2021-09-01 RX ORDER — EPHEDRINE SULFATE 50 MG/ML
INJECTION INTRAVENOUS PRN
Status: DISCONTINUED | OUTPATIENT
Start: 2021-09-01 | End: 2021-09-01 | Stop reason: SDUPTHER

## 2021-09-01 RX ORDER — ROCURONIUM BROMIDE 10 MG/ML
INJECTION, SOLUTION INTRAVENOUS PRN
Status: DISCONTINUED | OUTPATIENT
Start: 2021-09-01 | End: 2021-09-01 | Stop reason: SDUPTHER

## 2021-09-01 RX ORDER — FENTANYL CITRATE 50 UG/ML
50 INJECTION, SOLUTION INTRAMUSCULAR; INTRAVENOUS EVERY 5 MIN PRN
Status: DISCONTINUED | OUTPATIENT
Start: 2021-09-01 | End: 2021-09-01 | Stop reason: HOSPADM

## 2021-09-01 RX ORDER — HYDRALAZINE HYDROCHLORIDE 20 MG/ML
5 INJECTION INTRAMUSCULAR; INTRAVENOUS EVERY 10 MIN PRN
Status: DISCONTINUED | OUTPATIENT
Start: 2021-09-01 | End: 2021-09-01 | Stop reason: HOSPADM

## 2021-09-01 RX ORDER — DEXTROSE MONOHYDRATE 25 G/50ML
12.5 INJECTION, SOLUTION INTRAVENOUS PRN
Status: DISCONTINUED | OUTPATIENT
Start: 2021-09-01 | End: 2021-09-05 | Stop reason: HOSPADM

## 2021-09-01 RX ORDER — HYDROMORPHONE HCL 110MG/55ML
PATIENT CONTROLLED ANALGESIA SYRINGE INTRAVENOUS PRN
Status: DISCONTINUED | OUTPATIENT
Start: 2021-09-01 | End: 2021-09-01 | Stop reason: SDUPTHER

## 2021-09-01 RX ORDER — ONDANSETRON 2 MG/ML
4 INJECTION INTRAMUSCULAR; INTRAVENOUS
Status: DISCONTINUED | OUTPATIENT
Start: 2021-09-01 | End: 2021-09-01 | Stop reason: HOSPADM

## 2021-09-01 RX ORDER — MIDAZOLAM HYDROCHLORIDE 1 MG/ML
INJECTION INTRAMUSCULAR; INTRAVENOUS PRN
Status: DISCONTINUED | OUTPATIENT
Start: 2021-09-01 | End: 2021-09-01 | Stop reason: SDUPTHER

## 2021-09-01 RX ORDER — LIDOCAINE HYDROCHLORIDE 20 MG/ML
INJECTION, SOLUTION INFILTRATION; PERINEURAL PRN
Status: DISCONTINUED | OUTPATIENT
Start: 2021-09-01 | End: 2021-09-01 | Stop reason: SDUPTHER

## 2021-09-01 RX ORDER — PROMETHAZINE HYDROCHLORIDE 25 MG/ML
6.25 INJECTION, SOLUTION INTRAMUSCULAR; INTRAVENOUS
Status: DISCONTINUED | OUTPATIENT
Start: 2021-09-01 | End: 2021-09-01 | Stop reason: HOSPADM

## 2021-09-01 RX ORDER — INSULIN LISPRO 100 [IU]/ML
0-3 INJECTION, SOLUTION INTRAVENOUS; SUBCUTANEOUS NIGHTLY
Status: DISCONTINUED | OUTPATIENT
Start: 2021-09-01 | End: 2021-09-01

## 2021-09-01 RX ORDER — DEXTROSE MONOHYDRATE 50 MG/ML
100 INJECTION, SOLUTION INTRAVENOUS PRN
Status: DISCONTINUED | OUTPATIENT
Start: 2021-09-01 | End: 2021-09-05 | Stop reason: HOSPADM

## 2021-09-01 RX ORDER — SODIUM CHLORIDE 0.9 % (FLUSH) 0.9 %
10 SYRINGE (ML) INJECTION PRN
Status: DISCONTINUED | OUTPATIENT
Start: 2021-09-01 | End: 2021-09-01 | Stop reason: HOSPADM

## 2021-09-01 RX ORDER — DIPHENHYDRAMINE HYDROCHLORIDE 50 MG/ML
12.5 INJECTION INTRAMUSCULAR; INTRAVENOUS
Status: DISCONTINUED | OUTPATIENT
Start: 2021-09-01 | End: 2021-09-01 | Stop reason: HOSPADM

## 2021-09-01 RX ORDER — INSULIN LISPRO 100 [IU]/ML
0-6 INJECTION, SOLUTION INTRAVENOUS; SUBCUTANEOUS EVERY 6 HOURS
Status: DISCONTINUED | OUTPATIENT
Start: 2021-09-02 | End: 2021-09-04

## 2021-09-01 RX ORDER — DEXAMETHASONE SODIUM PHOSPHATE 4 MG/ML
INJECTION, SOLUTION INTRA-ARTICULAR; INTRALESIONAL; INTRAMUSCULAR; INTRAVENOUS; SOFT TISSUE PRN
Status: DISCONTINUED | OUTPATIENT
Start: 2021-09-01 | End: 2021-09-01 | Stop reason: SDUPTHER

## 2021-09-01 RX ORDER — SODIUM CHLORIDE 0.9 % (FLUSH) 0.9 %
5-40 SYRINGE (ML) INJECTION PRN
Status: DISCONTINUED | OUTPATIENT
Start: 2021-09-01 | End: 2021-09-05 | Stop reason: HOSPADM

## 2021-09-01 RX ORDER — ACETAMINOPHEN 500 MG
1000 TABLET ORAL EVERY 6 HOURS
Status: DISCONTINUED | OUTPATIENT
Start: 2021-09-01 | End: 2021-09-05 | Stop reason: HOSPADM

## 2021-09-01 RX ORDER — SODIUM CHLORIDE, SODIUM LACTATE, POTASSIUM CHLORIDE, CALCIUM CHLORIDE 600; 310; 30; 20 MG/100ML; MG/100ML; MG/100ML; MG/100ML
INJECTION, SOLUTION INTRAVENOUS CONTINUOUS
Status: DISCONTINUED | OUTPATIENT
Start: 2021-09-01 | End: 2021-09-01

## 2021-09-01 RX ORDER — INSULIN LISPRO 100 [IU]/ML
0-6 INJECTION, SOLUTION INTRAVENOUS; SUBCUTANEOUS
Status: DISCONTINUED | OUTPATIENT
Start: 2021-09-01 | End: 2021-09-01

## 2021-09-01 RX ORDER — NICOTINE POLACRILEX 4 MG
15 LOZENGE BUCCAL PRN
Status: DISCONTINUED | OUTPATIENT
Start: 2021-09-01 | End: 2021-09-05 | Stop reason: HOSPADM

## 2021-09-01 RX ORDER — ALBUTEROL SULFATE 2.5 MG/3ML
2.5 SOLUTION RESPIRATORY (INHALATION) EVERY 6 HOURS PRN
Status: DISCONTINUED | OUTPATIENT
Start: 2021-09-01 | End: 2021-09-05 | Stop reason: HOSPADM

## 2021-09-01 RX ORDER — FENTANYL CITRATE 50 UG/ML
INJECTION, SOLUTION INTRAMUSCULAR; INTRAVENOUS PRN
Status: DISCONTINUED | OUTPATIENT
Start: 2021-09-01 | End: 2021-09-01 | Stop reason: SDUPTHER

## 2021-09-01 RX ORDER — SODIUM CHLORIDE 0.9 % (FLUSH) 0.9 %
10 SYRINGE (ML) INJECTION EVERY 12 HOURS SCHEDULED
Status: DISCONTINUED | OUTPATIENT
Start: 2021-09-01 | End: 2021-09-01 | Stop reason: HOSPADM

## 2021-09-01 RX ORDER — APREPITANT 40 MG/1
40 CAPSULE ORAL ONCE
Status: COMPLETED | OUTPATIENT
Start: 2021-09-01 | End: 2021-09-01

## 2021-09-01 RX ORDER — ENALAPRILAT 2.5 MG/2ML
1.25 INJECTION INTRAVENOUS EVERY 6 HOURS PRN
Status: DISCONTINUED | OUTPATIENT
Start: 2021-09-01 | End: 2021-09-05 | Stop reason: HOSPADM

## 2021-09-01 RX ORDER — SODIUM CHLORIDE 9 MG/ML
25 INJECTION, SOLUTION INTRAVENOUS PRN
Status: DISCONTINUED | OUTPATIENT
Start: 2021-09-01 | End: 2021-09-05 | Stop reason: HOSPADM

## 2021-09-01 RX ORDER — DULOXETIN HYDROCHLORIDE 20 MG/1
20 CAPSULE, DELAYED RELEASE ORAL DAILY
Status: DISCONTINUED | OUTPATIENT
Start: 2021-09-02 | End: 2021-09-05 | Stop reason: HOSPADM

## 2021-09-01 RX ORDER — ATORVASTATIN CALCIUM 40 MG/1
40 TABLET, FILM COATED ORAL DAILY
Status: DISCONTINUED | OUTPATIENT
Start: 2021-09-02 | End: 2021-09-05 | Stop reason: HOSPADM

## 2021-09-01 RX ORDER — ONDANSETRON 2 MG/ML
4 INJECTION INTRAMUSCULAR; INTRAVENOUS EVERY 6 HOURS PRN
Status: DISCONTINUED | OUTPATIENT
Start: 2021-09-01 | End: 2021-09-05 | Stop reason: HOSPADM

## 2021-09-01 RX ORDER — PROPOFOL 10 MG/ML
INJECTION, EMULSION INTRAVENOUS PRN
Status: DISCONTINUED | OUTPATIENT
Start: 2021-09-01 | End: 2021-09-01 | Stop reason: SDUPTHER

## 2021-09-01 RX ORDER — GLYCOPYRROLATE 1 MG/5 ML
SYRINGE (ML) INTRAVENOUS PRN
Status: DISCONTINUED | OUTPATIENT
Start: 2021-09-01 | End: 2021-09-01 | Stop reason: SDUPTHER

## 2021-09-01 RX ORDER — FAMOTIDINE 20 MG/1
20 TABLET, FILM COATED ORAL 2 TIMES DAILY
Status: DISCONTINUED | OUTPATIENT
Start: 2021-09-01 | End: 2021-09-05 | Stop reason: HOSPADM

## 2021-09-01 RX ADMIN — FENTANYL CITRATE 50 MCG: 50 INJECTION, SOLUTION INTRAMUSCULAR; INTRAVENOUS at 12:28

## 2021-09-01 RX ADMIN — HYDROMORPHONE HYDROCHLORIDE 0.5 MG: 2 INJECTION, SOLUTION INTRAMUSCULAR; INTRAVENOUS; SUBCUTANEOUS at 13:55

## 2021-09-01 RX ADMIN — HYDROMORPHONE HYDROCHLORIDE 0.5 MG: 1 INJECTION, SOLUTION INTRAMUSCULAR; INTRAVENOUS; SUBCUTANEOUS at 15:04

## 2021-09-01 RX ADMIN — FENTANYL CITRATE 50 MCG: 50 INJECTION, SOLUTION INTRAMUSCULAR; INTRAVENOUS at 15:56

## 2021-09-01 RX ADMIN — FENTANYL CITRATE 50 MCG: 50 INJECTION, SOLUTION INTRAMUSCULAR; INTRAVENOUS at 11:56

## 2021-09-01 RX ADMIN — SUCCINYLCHOLINE CHLORIDE 120 MG: 20 INJECTION, SOLUTION INTRAMUSCULAR; INTRAVENOUS; PARENTERAL at 11:56

## 2021-09-01 RX ADMIN — FAMOTIDINE 20 MG: 20 TABLET ORAL at 21:04

## 2021-09-01 RX ADMIN — MIDAZOLAM HYDROCHLORIDE 1 MG: 2 INJECTION, SOLUTION INTRAMUSCULAR; INTRAVENOUS at 11:48

## 2021-09-01 RX ADMIN — DEXMEDETOMIDINE HYDROCHLORIDE 4 MCG: 100 INJECTION, SOLUTION INTRAVENOUS at 13:21

## 2021-09-01 RX ADMIN — HYDROMORPHONE HYDROCHLORIDE 0.5 MG: 1 INJECTION, SOLUTION INTRAMUSCULAR; INTRAVENOUS; SUBCUTANEOUS at 15:26

## 2021-09-01 RX ADMIN — FENTANYL CITRATE 50 MCG: 50 INJECTION, SOLUTION INTRAMUSCULAR; INTRAVENOUS at 12:34

## 2021-09-01 RX ADMIN — ROCURONIUM BROMIDE 10 MG: 10 INJECTION INTRAVENOUS at 12:48

## 2021-09-01 RX ADMIN — LIDOCAINE HYDROCHLORIDE 100 MG: 20 INJECTION, SOLUTION INFILTRATION; PERINEURAL at 11:56

## 2021-09-01 RX ADMIN — CEFAZOLIN 2000 MG: 10 INJECTION, POWDER, FOR SOLUTION INTRAVENOUS at 11:51

## 2021-09-01 RX ADMIN — PROPOFOL 150 MG: 10 INJECTION, EMULSION INTRAVENOUS at 11:56

## 2021-09-01 RX ADMIN — DEXMEDETOMIDINE HYDROCHLORIDE 4 MCG: 100 INJECTION, SOLUTION INTRAVENOUS at 13:19

## 2021-09-01 RX ADMIN — HYDROMORPHONE HYDROCHLORIDE 0.5 MG: 2 INJECTION, SOLUTION INTRAMUSCULAR; INTRAVENOUS; SUBCUTANEOUS at 13:53

## 2021-09-01 RX ADMIN — FENTANYL CITRATE 50 MCG: 50 INJECTION, SOLUTION INTRAMUSCULAR; INTRAVENOUS at 14:39

## 2021-09-01 RX ADMIN — APREPITANT 40 MG: 40 CAPSULE ORAL at 10:41

## 2021-09-01 RX ADMIN — FENTANYL CITRATE 50 MCG: 50 INJECTION, SOLUTION INTRAMUSCULAR; INTRAVENOUS at 12:14

## 2021-09-01 RX ADMIN — HYDROMORPHONE HYDROCHLORIDE 1 MG: 1 INJECTION, SOLUTION INTRAMUSCULAR; INTRAVENOUS; SUBCUTANEOUS at 18:36

## 2021-09-01 RX ADMIN — SUGAMMADEX 200 MG: 100 INJECTION, SOLUTION INTRAVENOUS at 13:58

## 2021-09-01 RX ADMIN — ENOXAPARIN SODIUM 40 MG: 40 INJECTION SUBCUTANEOUS at 18:41

## 2021-09-01 RX ADMIN — EPHEDRINE SULFATE 10 MG: 50 INJECTION INTRAVENOUS at 13:06

## 2021-09-01 RX ADMIN — Medication 10 ML: at 21:04

## 2021-09-01 RX ADMIN — DEXMEDETOMIDINE HYDROCHLORIDE 4 MCG: 100 INJECTION, SOLUTION INTRAVENOUS at 12:47

## 2021-09-01 RX ADMIN — DEXMEDETOMIDINE HYDROCHLORIDE 4 MCG: 100 INJECTION, SOLUTION INTRAVENOUS at 12:44

## 2021-09-01 RX ADMIN — ONDANSETRON 4 MG: 2 INJECTION INTRAMUSCULAR; INTRAVENOUS at 12:10

## 2021-09-01 RX ADMIN — ROCURONIUM BROMIDE 20 MG: 10 INJECTION INTRAVENOUS at 12:10

## 2021-09-01 RX ADMIN — ACETAMINOPHEN 1000 MG: 500 TABLET, FILM COATED ORAL at 18:48

## 2021-09-01 RX ADMIN — DEXMEDETOMIDINE HYDROCHLORIDE 4 MCG: 100 INJECTION, SOLUTION INTRAVENOUS at 12:40

## 2021-09-01 RX ADMIN — ROCURONIUM BROMIDE 10 MG: 10 INJECTION INTRAVENOUS at 12:30

## 2021-09-01 RX ADMIN — SODIUM CHLORIDE, POTASSIUM CHLORIDE, SODIUM LACTATE AND CALCIUM CHLORIDE: 600; 310; 30; 20 INJECTION, SOLUTION INTRAVENOUS at 12:58

## 2021-09-01 RX ADMIN — DEXAMETHASONE SODIUM PHOSPHATE 4 MG: 4 INJECTION, SOLUTION INTRAMUSCULAR; INTRAVENOUS at 12:10

## 2021-09-01 RX ADMIN — HYDROMORPHONE HYDROCHLORIDE 1 MG: 1 INJECTION, SOLUTION INTRAMUSCULAR; INTRAVENOUS; SUBCUTANEOUS at 22:32

## 2021-09-01 RX ADMIN — METHOCARBAMOL 500 MG: 100 INJECTION, SOLUTION INTRAMUSCULAR; INTRAVENOUS at 16:11

## 2021-09-01 RX ADMIN — ROCURONIUM BROMIDE 10 MG: 10 INJECTION INTRAVENOUS at 11:56

## 2021-09-01 RX ADMIN — Medication 0.2 MG: at 13:04

## 2021-09-01 RX ADMIN — SODIUM CHLORIDE, POTASSIUM CHLORIDE, SODIUM LACTATE AND CALCIUM CHLORIDE: 600; 310; 30; 20 INJECTION, SOLUTION INTRAVENOUS at 10:25

## 2021-09-01 ASSESSMENT — PAIN DESCRIPTION - PROGRESSION: CLINICAL_PROGRESSION: NOT CHANGED

## 2021-09-01 ASSESSMENT — PULMONARY FUNCTION TESTS
PIF_VALUE: 22
PIF_VALUE: 22
PIF_VALUE: 21
PIF_VALUE: 2
PIF_VALUE: 23
PIF_VALUE: 22
PIF_VALUE: 20
PIF_VALUE: 21
PIF_VALUE: 15
PIF_VALUE: 21
PIF_VALUE: 0
PIF_VALUE: 21
PIF_VALUE: 2
PIF_VALUE: 21
PIF_VALUE: 21
PIF_VALUE: 19
PIF_VALUE: 19
PIF_VALUE: 21
PIF_VALUE: 19
PIF_VALUE: 21
PIF_VALUE: 1
PIF_VALUE: 15
PIF_VALUE: 21
PIF_VALUE: 22
PIF_VALUE: 20
PIF_VALUE: 20
PIF_VALUE: 19
PIF_VALUE: 20
PIF_VALUE: 21
PIF_VALUE: 21
PIF_VALUE: 2
PIF_VALUE: 22
PIF_VALUE: 21
PIF_VALUE: 22
PIF_VALUE: 21
PIF_VALUE: 20
PIF_VALUE: 17
PIF_VALUE: 21
PIF_VALUE: 1
PIF_VALUE: 20
PIF_VALUE: 21
PIF_VALUE: 19
PIF_VALUE: 21
PIF_VALUE: 21
PIF_VALUE: 20
PIF_VALUE: 1
PIF_VALUE: 2
PIF_VALUE: 21
PIF_VALUE: 19
PIF_VALUE: 21
PIF_VALUE: 22
PIF_VALUE: 20
PIF_VALUE: 21
PIF_VALUE: 21
PIF_VALUE: 20
PIF_VALUE: 21
PIF_VALUE: 2
PIF_VALUE: 21
PIF_VALUE: 20
PIF_VALUE: 21
PIF_VALUE: 2
PIF_VALUE: 21
PIF_VALUE: 17
PIF_VALUE: 21
PIF_VALUE: 20
PIF_VALUE: 21
PIF_VALUE: 16
PIF_VALUE: 21
PIF_VALUE: 21
PIF_VALUE: 20
PIF_VALUE: 0
PIF_VALUE: 21
PIF_VALUE: 21
PIF_VALUE: 24
PIF_VALUE: 20
PIF_VALUE: 20
PIF_VALUE: 22
PIF_VALUE: 0
PIF_VALUE: 21
PIF_VALUE: 17
PIF_VALUE: 2
PIF_VALUE: 21
PIF_VALUE: 22
PIF_VALUE: 21
PIF_VALUE: 21
PIF_VALUE: 22
PIF_VALUE: 15
PIF_VALUE: 22
PIF_VALUE: 20
PIF_VALUE: 20
PIF_VALUE: 0
PIF_VALUE: 20
PIF_VALUE: 20
PIF_VALUE: 2
PIF_VALUE: 20
PIF_VALUE: 2
PIF_VALUE: 22
PIF_VALUE: 20
PIF_VALUE: 21
PIF_VALUE: 19
PIF_VALUE: 21
PIF_VALUE: 20
PIF_VALUE: 22
PIF_VALUE: 20
PIF_VALUE: 21
PIF_VALUE: 15
PIF_VALUE: 22
PIF_VALUE: 19
PIF_VALUE: 21
PIF_VALUE: 20
PIF_VALUE: 20
PIF_VALUE: 0
PIF_VALUE: 21
PIF_VALUE: 21
PIF_VALUE: 17
PIF_VALUE: 2
PIF_VALUE: 21
PIF_VALUE: 17
PIF_VALUE: 2
PIF_VALUE: 22
PIF_VALUE: 21
PIF_VALUE: 19
PIF_VALUE: 20
PIF_VALUE: 3
PIF_VALUE: 21
PIF_VALUE: 1

## 2021-09-01 ASSESSMENT — PAIN SCALES - GENERAL
PAINLEVEL_OUTOF10: 10
PAINLEVEL_OUTOF10: 5
PAINLEVEL_OUTOF10: 10
PAINLEVEL_OUTOF10: 5
PAINLEVEL_OUTOF10: 10

## 2021-09-01 ASSESSMENT — PAIN DESCRIPTION - PAIN TYPE
TYPE: SURGICAL PAIN

## 2021-09-01 ASSESSMENT — PAIN DESCRIPTION - FREQUENCY
FREQUENCY: INTERMITTENT
FREQUENCY: CONTINUOUS
FREQUENCY: INTERMITTENT

## 2021-09-01 ASSESSMENT — PAIN DESCRIPTION - LOCATION
LOCATION: ABDOMEN

## 2021-09-01 ASSESSMENT — PAIN DESCRIPTION - DESCRIPTORS
DESCRIPTORS: CRAMPING;SORE
DESCRIPTORS: ACHING;SORE
DESCRIPTORS: DISCOMFORT
DESCRIPTORS: DISCOMFORT
DESCRIPTORS: CRAMPING

## 2021-09-01 ASSESSMENT — PAIN - FUNCTIONAL ASSESSMENT: PAIN_FUNCTIONAL_ASSESSMENT: 0-10

## 2021-09-01 ASSESSMENT — PAIN DESCRIPTION - ORIENTATION
ORIENTATION: MID
ORIENTATION: MID
ORIENTATION: ANTERIOR
ORIENTATION: MID
ORIENTATION: ANTERIOR

## 2021-09-01 ASSESSMENT — LIFESTYLE VARIABLES: SMOKING_STATUS: 1

## 2021-09-01 ASSESSMENT — PAIN DESCRIPTION - ONSET: ONSET: ON-GOING

## 2021-09-01 NOTE — PROGRESS NOTES
Ice chips given, pt sleeping at intervals, wakes up moaning with muscle spasms, waiting for IV robaxin to come up

## 2021-09-01 NOTE — ANESTHESIA PRE PROCEDURE
Department of Anesthesiology  Preprocedure Note       Name:  Jamil Eden   Age:  74 y.o.  :  1955                                          MRN:  [id number]         Date:  2021      Surgeon: Francisca Rebollar):  Mateo Calix MD    Procedure: Procedure(s):  OPEN INCISIONAL HERNIA REPAIR    Medications prior to admission:   Prior to Admission medications    Medication Sig Start Date End Date Taking? Authorizing Provider   VICTOZA 18 MG/3ML SOPN SC injection INJECT 0.6MG MG SUBCUTANEOUSLY ONCE DAILY 21  Yes Historical Provider, MD   glipiZIDE (GLUCOTROL XL) 5 MG extended release tablet Take 5 mg by mouth daily (with breakfast) 21  Yes Historical Provider, MD   amLODIPine (NORVASC) 10 MG tablet Take 1 tablet by mouth daily 21  Yes JEVON Ballard - CNP   polyethylene glycol (MIRALAX) 17 g PACK packet Take 17 g by mouth daily   Yes Historical Provider, MD   zolpidem (AMBIEN) 10 MG tablet  3/9/21  Yes Historical Provider, MD   acetaminophen (TYLENOL) 325 MG tablet Take 325 mg by mouth as needed 20  Yes Historical Provider, MD   DULoxetine (CYMBALTA) 20 MG extended release capsule Take 20 mg by mouth daily   Yes Historical Provider, MD   loperamide (RA ANTI-DIARRHEAL) 2 MG capsule Take 1 capsule by mouth 4 times daily as needed for Diarrhea 16  Yes Mateo Calix MD   Multiple Vitamins-Minerals (MULTIVITAMIN PO) Take by mouth   Yes Historical Provider, MD   LORazepam (ATIVAN) 0.5 MG tablet Take 1 tablet by mouth every 12 hours as needed  Patient taking differently: Take 0.5 mg by mouth 2 times daily.   3/31/16  Yes Laurent Zepeda MD   lurasidone (LATUDA) 40 MG TABS tablet Take 1 tablet by mouth daily 3/31/16  Yes Laurent Zepeda MD   famotidine (PEPCID) 20 MG tablet Take 1 tablet by mouth 2 times daily 3/22/16  Yes JEVON Lawrence - CNP   atorvastatin (LIPITOR) 40 MG tablet Take 40 mg by mouth daily   Yes Historical Provider, MD   albuterol sulfate  (90 BASE) MCG/ACT inhaler Inhale 2 puffs into the lungs every 6 hours as needed for Wheezing   Yes Historical Provider, MD   diphenhydrAMINE (BENADRYL) 25 MG capsule Take 25-50 mg by mouth nightly as needed for Sleep    Yes Historical Provider, MD       Current medications:    Current Facility-Administered Medications   Medication Dose Route Frequency Provider Last Rate Last Admin    ceFAZolin (ANCEF) 2000 mg in dextrose 5 % 50 mL IVPB  2,000 mg IntraVENous Once Janna Morris MD        lactated ringers infusion   IntraVENous Continuous Lisa Prader,  mL/hr at 09/01/21 1025 New Bag at 09/01/21 1025    sodium chloride flush 0.9 % injection 10 mL  10 mL IntraVENous 2 times per day Lisa Prader, DO        sodium chloride flush 0.9 % injection 10 mL  10 mL IntraVENous PRN Lisa Prader, DO        0.9 % sodium chloride infusion  25 mL IntraVENous PRN Lisa Prader, DO           Allergies:  No Known Allergies    Problem List:    Patient Active Problem List   Diagnosis Code    Colonic obstruction ([facility] K56.609    Respiratory failure following trauma and surgery ([facility] J95.821    Ischemic bowel disease ([facility] K55.9    Obesity E66.9    Debility R53.81    Type 2 diabetes mellitus without complication, without long-term current use of insulin ([address] E11.9    H/O ileostomy Z98.890    Encephalopathy acute G93.40    Rectal prolapse K62.3       Past Medical History:        Diagnosis Date    Anxiety, generalized     CPAP (continuous positive airway pressure) dependence     Diabetes mellitus ([address]     no meds    Diverticulitis     H/O ischemic bowel disease 03/2016    Hepatitis C     History of blood transfusion     s/p hysterectomy    History of kidney stones     x 1 episode    Hyperlipidemia     LBBB (left bundle branch block)     Loose stools     secondary to history of colectomy    Paranoid schizophrenia ([address]     Proteinuria     history of    Rectal prolapse     Retention of urine     Sciatica     Sleep apnea     USES CPAP    Sleep apnea     Wears partial dentures        Past Surgical History:        Procedure Laterality Date    ABDOMEN SURGERY  2016    OPEN ILEOSTOMY TAKEDOWN                   COLONOSCOPY      COLPOPEXY N/A 2019    OPEN RECTOPEXY, LYSIS OF ADHESIONS, INTRAOPERATIVE FLEXIBLE SIGMOIDOSCOPY [facility]    CYSTOSCOPY, BILATERAL URETERAL STENT INSERTION performed by Tracy Lomax MD at [address] [place] [address]      w/tubaligation    HYSTERECTOMY      PARTIAL VAGINAL    JOINT REPLACEMENT Bilateral     knees    KIDNEY STONE SURGERY      KIDNEY STONE SURGERY      KNEE SURGERY      bilateral knee replacements    OTHER SURGICAL HISTORY  3/5/16    DIAGNOSTIC LAPAROSCOPY, SUBTOTAL COLECTOMY, COLOSTOMY,    [place] COLONOSCOPY FLX DX W/COLLJ SPEC WHEN PFRMD N/A 10/25/2018    COLONOSCOPY, POSSIBLE POLYPECTOMY WITH MAC performed by Radha Black MD at [address] [place] [address] N/A 3/23/2021    EXAM UNDER ANESTHESIA, DELORME PROCEDURE performed by Radha Black MD at [address] EXTRACTION         Social History:    Social History     Tobacco Use    Smoking status: Current Every Day Smoker     Packs/day: 0.50     Years: 20.00     Pack years: 10.00     Types: Cigarettes     Start date: 3/22/2001     Last attempt to quit: 2016     Years since quittin.5    Smokeless tobacco: Never Used   Substance Use Topics    Alcohol use:  No                                Ready to quit: Not Answered  Counseling given: Not Answered      Vital Signs (Current):   Vitals:    21 0852 21 1013   BP:  125/82   Pulse:  78   Resp:  14   Temp:  98.4 °F (36.9 °C)   TempSrc:  Oral   SpO2:  96%   Weight: 170 lb (77.1 kg) 170 lb (77.1 kg)   Height: 4' 11\" (1.499 m) 4' 11\" (1.499 m)                                              BP Readings from Last 3 Encounters:   21 125/82   08/18/21 (!) 144/78   08/16/21 (!) 141/87       NPO Status: Time of last liquid consumption: 1800                        Time of last solid consumption: 1800                        Date of last liquid consumption: 08/31/21                        Date of last solid food consumption: 08/31/21    BMI:   Wt Readings from Last 3 Encounters:   09/01/21 170 lb (77.1 kg)   08/18/21 179 lb 3.2 oz (81.3 kg)   08/16/21 177 lb 3.2 oz (80.4 kg)     Body mass index is 34.34 kg/m². CBC:   Lab Results   Component Value Date    WBC 5.9 03/25/2021    RBC 3.95 03/25/2021    HGB 13.2 03/25/2021    HCT 38.7 03/25/2021    MCV 98.0 03/25/2021    RDW 12.8 03/25/2021     03/25/2021       CMP:   Lab Results   Component Value Date     03/25/2021    K 5.2 03/25/2021    CL 99 03/25/2021    CO2 28 03/25/2021    BUN 15 03/25/2021    CREATININE 0.8 03/25/2021    GFRAA >60 03/25/2021    AGRATIO 1.3 03/08/2021    LABGLOM >60 03/25/2021    GLUCOSE 220 03/25/2021    PROT 7.9 03/08/2021    CALCIUM 9.5 03/25/2021    BILITOT <0.2 03/08/2021    ALKPHOS 61 03/08/2021    AST 21 03/08/2021    ALT 19 03/08/2021       POC Tests: No results for input(s): POCGLU, POCNA, POCK, POCCL, POCBUN, POCHEMO, POCHCT in the last 72 hours.     Coags:   Lab Results   Component Value Date    PROTIME 12.9 03/23/2021    INR 1.11 03/23/2021    APTT 34.1 03/23/2021       HCG (If Applicable):   Lab Results   Component Value Date    PREGTESTUR Negative 03/02/2016        ABGs:   Lab Results   Component Value Date    PHART 7.51 03/14/2016    PO2ART 67 03/14/2016    WSO8JUH 46 03/14/2016    CBU9RNV 36 03/14/2016    BEART 11.6 03/14/2016    R5WDQDCG 95 03/14/2016        Type & Screen (If Applicable):  No results found for: LABABO, LABRH    Drug/Infectious Status (If Applicable):  No results found for: HIV, HEPCAB    COVID-19 Screening (If Applicable):   Lab Results   Component Value Date    COVID19 Not Detected 03/17/2021           Anesthesia Evaluation  Patient summary reviewed and Nursing notes reviewed no history of anesthetic complications:   Airway: Mallampati: I  TM distance: <3 FB   Neck ROM: full  Mouth opening: > = 3 FB Dental:    (+) upper dentures and lower dentures      Pulmonary:normal exam    (+) current smoker    (-) sleep apnea          Patient did not smoke on day of surgery. Cardiovascular:  Exercise tolerance: good (>4 METS),   (+) hypertension: mild,       ECG reviewed  Rhythm: regular  Rate: normal           Beta Blocker:  Not on Beta Blocker         Neuro/Psych:   (+) depression/anxiety             GI/Hepatic/Renal:             Endo/Other:    (+) Diabetes, . Abdominal:             Vascular: Other Findings:             Anesthesia Plan      general     ASA 2       Induction: intravenous. MIPS: Postoperative opioids intended. Anesthetic plan and risks discussed with patient. Use of blood products discussed with patient whom consented to blood products. Plan discussed with CRNA.     Attending anesthesiologist reviewed and agrees with Preprocedure content              Chris Graves DO   9/1/2021

## 2021-09-01 NOTE — ANESTHESIA POSTPROCEDURE EVALUATION
Department of Anesthesiology  Postprocedure Note    Patient: Cordell Christensen  MRN: [id number]  YOB: 1955  Date of evaluation: 9/1/2021  Time:  5:05 PM     Procedure Summary     Date: 09/01/21 Room / Location: [address] [place] [address]    Anesthesia Start: 7947 Anesthesia Stop: 8953    Procedure: OPEN INCISIONAL HERNIA REPAIR (N/A ) Diagnosis:       Incisional hernia without obstruction or gangrene      (Incisional hernia without obstruction or gangrene [K43.2])    Surgeons: Oletha Curling, MD Responsible Provider: Babak Lehman DO    Anesthesia Type: general ASA Status: 2          Anesthesia Type: general    Glenis Phase I: Glenis Score: 8    Glenis Phase II:      Last vitals: Reviewed and per EMR flowsheets.        Anesthesia Post Evaluation    Patient location during evaluation: PACU  Patient participation: complete - patient participated  Level of consciousness: awake  Pain score: 2  Airway patency: patent  Nausea & Vomiting: no nausea and no vomiting  Complications: no  Cardiovascular status: hemodynamically stable  Respiratory status: acceptable  Hydration status: euvolemic

## 2021-09-01 NOTE — RT PROTOCOL NOTE
RT Nebulizer Bronchodilator Protocol Note    There is a bronchodilator order in the chart from a provider indicating to follow the RT Bronchodilator Protocol and there is an Initiate RT Bronchodilator Protocol order as well (see protocol at bottom of note). The findings from the last RT Protocol Assessment were as follows:  Smoking: Non smoker  Surgical Status: Upper abdominal  Xray: Clear  Respiratory Pattern: RR 12-20  Mental Status: Alert and Oriented  Breath Sounds: Clear  Cough: Strong, spontaneous, non-productive  Activity Level: Walking with assistance  Oxygen Requirement: Room Air - 2LNC/28% or home setting  Indication for Bronchodilator Therapy: On home bronchodilators  Bronchodilator Assessment Score: 0    Aerosolized bronchodilator medication orders have been revised according to the RT Bronchodilator Protocol. RT Bronchodilator Protocol:    Respiratory Therapist to perform RT Therapy Protocol Assessment then follow the protocol. No Indications - adjust the frequency to every 6 hours PRN wheezing or bronchospasm, if no treatments needed after 48 hours then discontinue using Per Protocol order mode. If indication present, adjust the RT bronchodilator orders based on the Bronchodilator Assessment Score as follows:    0-6 - enter or revise RT Bronchodilator order to Albuterol Nebulizer order with frequency of every 2 hours PRN for wheezing or increased work of breathing using Per Protocol order mode. Repeat RT Therapy Protocol Assessment as needed. 7-10 - discontinue any other Inpatient aerosolized bronchodilator medication orders and enter or revise two Albuterol Nebulizer orders, one with BID frequency and one with frequency of every 2 hours PRN wheezing or increased work of breathing using Per Protocol order mode. Repeat RT Therapy Protocol Assessment with second treatment then BID and as needed.       11-13 - discontinue any other Inpatient aerosolized bronchodilator medication orders and enter DuoNeb Nebulizer order with QID frequency and an Albuterol Nebulizer order with frequency of every 2 hours PRN wheezing or increased work of breathing using Per Protocol order mode. Repeat RT Therapy Protocol Assessment with second treatment then QID and as needed. Greater than 13 - discontinue any other Inpatient aerosolized bronchodilator medication orders and enter a DuoNeb Nebulizer order with every 4 hours frequency and an Albuterol Nebulizer order with frequency of every 2 hours PRN wheezing or increased work of breathing using Per Protocol order mode. Repeat RT Therapy Protocol Assessment with second treatment then every 4 hours and as needed. RT to enter RT Home Evaluation for COPD & MDI Assessment order using Per Protocol order mode.     Electronically signed by Jose Francisco Nino RCP on 9/1/2021 at 6:30 PM

## 2021-09-01 NOTE — H&P
General Surgery   Resident History and Physical     History of Present Illness:    Mellisa Broussard is a 74 y.o. female with Hx of colon resection and ileostomy take down was seen in office on 8/20 and scheduled for elective open hernia repair. Patient has no new medical issues since last seen.     Past Medical History:        Diagnosis Date    Anxiety, generalized     CPAP (continuous positive airway pressure) dependence     Diabetes mellitus (HCC)     no meds    Diverticulitis     H/O ischemic bowel disease 03/2016    Hepatitis C     History of blood transfusion     s/p hysterectomy    History of kidney stones     x 1 episode    Hyperlipidemia     LBBB (left bundle branch block)     Loose stools     secondary to history of colectomy    Paranoid schizophrenia ([facility] [address]     Proteinuria     history of    Rectal prolapse     Retention of urine     Sciatica     Sleep apnea     USES CPAP    Sleep apnea     Wears partial dentures        Past Surgical History:        Procedure Laterality Date    ABDOMEN SURGERY  09/20/2016    OPEN ILEOSTOMY TAKEDOWN                   COLONOSCOPY      COLPOPEXY N/A 4/26/2019    OPEN RECTOPEXY, LYSIS OF ADHESIONS, INTRAOPERATIVE FLEXIBLE SIGMOIDOSCOPY performed by Philip Suarez MD at [place] Bilateral 4/26/2019    CYSTOSCOPY, BILATERAL URETERAL STENT INSERTION performed by Amber Odell MD at [place] [address]      w/tubaligation    HYSTERECTOMY      PARTIAL VAGINAL    JOINT REPLACEMENT Bilateral     knees    KIDNEY STONE SURGERY      KIDNEY STONE SURGERY      KNEE SURGERY      bilateral knee replacements    OTHER SURGICAL HISTORY  3/5/16    DIAGNOSTIC LAPAROSCOPY, SUBTOTAL COLECTOMY, COLOSTOMY,    [place] COLONOSCOPY FLX DX W/COLLJ SPEC WHEN PFRMD N/A 10/25/2018    COLONOSCOPY, POSSIBLE POLYPECTOMY WITH MAC performed by Jensen Mora MD at [address] [place] [address] N/A 3/23/2021    EXAM UNDER ANESTHESIA, Hunter Bustamante PROCEDURE performed by Brisa Arroyo MD at [address] EXTRACTION         Allergies:    Patient has no known allergies. Medications:   Home Meds  No current facility-administered medications on file prior to encounter.      Current Outpatient Medications on File Prior to Encounter   Medication Sig Dispense Refill    VICTOZA 18 MG/3ML SOPN SC injection INJECT 0.6MG MG SUBCUTANEOUSLY ONCE DAILY      glipiZIDE (GLUCOTROL XL) 5 MG extended release tablet Take 5 mg by mouth daily (with breakfast)      amLODIPine (NORVASC) 10 MG tablet Take 1 tablet by mouth daily 90 tablet 3    polyethylene glycol (MIRALAX) 17 g PACK packet Take 17 g by mouth daily      zolpidem (AMBIEN) 10 MG tablet       acetaminophen (TYLENOL) 325 MG tablet Take 325 mg by mouth as needed      DULoxetine (CYMBALTA) 20 MG extended release capsule Take 20 mg by mouth daily      loperamide (RA ANTI-DIARRHEAL) 2 MG capsule Take 1 capsule by mouth 4 times daily as needed for Diarrhea 90 capsule 5    Multiple Vitamins-Minerals (MULTIVITAMIN PO) Take by mouth      LORazepam (ATIVAN) 0.5 MG tablet Take 1 tablet by mouth every 12 hours as needed (Patient taking differently: Take 0.5 mg by mouth 2 times daily. ) 60 tablet 0    lurasidone (LATUDA) 40 MG TABS tablet Take 1 tablet by mouth daily 30 tablet 0    famotidine (PEPCID) 20 MG tablet Take 1 tablet by mouth 2 times daily 60 tablet 3    atorvastatin (LIPITOR) 40 MG tablet Take 40 mg by mouth daily      albuterol sulfate  (90 BASE) MCG/ACT inhaler Inhale 2 puffs into the lungs every 6 hours as needed for Wheezing      diphenhydrAMINE (BENADRYL) 25 MG capsule Take 25-50 mg by mouth nightly as needed for Sleep          Current Meds  lactated ringers infusion, Continuous  sodium chloride flush 0.9 % injection 10 mL, 2 times per day  sodium chloride flush 0.9 % injection 10 mL, PRN  0.9 % sodium chloride infusion, PRN  sodium chloride 0.9 % irrigation, Continuous PRN  HYDROmorphone (DILAUDID) injection 0.5 mg, Q5 Min PRN  fentaNYL (SUBLIMAZE) injection 50 mcg, Q5 Min PRN  HYDROmorphone (DILAUDID) injection 0.5 mg, Q5 Min PRN  ondansetron (ZOFRAN) injection 4 mg, Once PRN  promethazine (PHENERGAN) injection 6.25 mg, Once PRN  diphenhydrAMINE (BENADRYL) injection 12.5 mg, Once PRN  labetalol (NORMODYNE;TRANDATE) injection 5 mg, Q10 Min PRN  hydrALAZINE (APRESOLINE) injection 5 mg, Q10 Min PRN  methocarbamol (ROBAXIN) 1,000 mg in dextrose 5 % 100 mL IVPB, Once PRN        Family History:   History reviewed. No pertinent family history. Social History:   TOBACCO:   reports that she has been smoking cigarettes. She started smoking about 20 years ago. She has a 10.00 pack-year smoking history. She has never used smokeless tobacco.  ETOH:   reports no history of alcohol use. DRUGS:   reports no history of drug use. Review of Systems:   14 point review of systems was conducted and all pertinent positives and negatives were included in the HPI. Physical exam:    Vitals:    09/01/21 1435 09/01/21 1445 09/01/21 1500 09/01/21 1504   BP: (!) 142/82 (!) 153/77 (!) 154/71    Pulse: 88 81 81    Resp: 15 16 19    Temp:       TempSrc:       SpO2: 98% 97% 98% 98%   Weight:       Height:           General appearance: alert, no acute distress, grooming appropriate  Eyes: PERRLA, no scleral icterus  Neck: trachea midline, no JVD, no lymphadenopathy  Chest/Lungs: CTAB, no crackles/rales, wheezes/rhonchi, normal effort  Cardiovascular: RRR, no murmurs/gallops/rubs  Abdomen: soft, non-tender, non-distended, +BS, no guarding/rigidity  Skin: warm and dry, no rashes  Extremities: no edema, no cyanosis  Neuro: A&Ox3, no focal deficits, sensation intact    Labs:    CBC: No results for input(s): WBC, HGB, HCT, MCV, PLT in the last 72 hours. BMP: No results for input(s): NA, K, CL, CO2, PHOS, BUN, CREATININE in the last 72 hours.     Invalid input(s): CA  PT/INR: No results for input(s): PROTIME, INR in the last 72 hours. APTT: No results for input(s): APTT in the last 72 hours. Liver Profile:   Lab Results   Component Value Date    AST 21 03/08/2021    ALT 19 03/08/2021    BILIDIR <0.2 10/23/2016    BILITOT <0.2 03/08/2021    ALKPHOS 61 03/08/2021     Lab Results   Component Value Date    CHOL 162 03/08/2021    HDL 74 03/08/2021    TRIG 131 03/08/2021     UA:   Lab Results   Component Value Date    COLORU Yellow 03/24/2021    PHUR 6.0 03/24/2021    WBCUA 3-5 04/17/2019    RBCUA 0-2 04/17/2019    YEAST Present 03/13/2016    BACTERIA 1+ 04/17/2019    CLARITYU Clear 03/24/2021    SPECGRAV 1.015 03/24/2021    LEUKOCYTESUR Negative 03/24/2021    UROBILINOGEN 0.2 03/24/2021    BILIRUBINUR Negative 03/24/2021    BLOODU Negative 03/24/2021    GLUCOSEU Negative 03/24/2021    AMORPHOUS 2+ 03/13/2016       Imaging:   No orders to display       Assessment/Plan: This is a 74 y.o. female presenting for elective open hernia repair.  The patient has no changes in health since last seen in clinic.    - ok to proceed with surgery today  - patient will be admitted post operatively    Fvaiola Brown DO  09/01/21  3:13 PM

## 2021-09-01 NOTE — BRIEF OP NOTE
Brief Postoperative Note      Patient: Alicia Mejía  YOB: 1955  MRN: [id number]    Date of Procedure: 9/1/2021    Pre-Op Diagnosis: Incisional hernia without obstruction or gangrene [K43.2]    Post-Op Diagnosis: Same       Procedure(s):  OPEN INCISIONAL HERNIA REPAIR    Surgeon(s):  Heriberto Armenta MD    Assistant:  Resident: Zayda Marquez DO    Anesthesia: General    Estimated Blood Loss (mL): less than 981     Complications: None    Specimens:   ID Type Source Tests Collected by Time Destination   A : HERNIA [address] Tissue Tissue SURGICAL PATHOLOGY Heriberto Armenta MD 9/1/2021 1228        Implants:  * No implants in log *      Drains:   Closed/Suction Drain Medial RUQ Bulb 19 Irish (Active)       Negative Pressure Wound Therapy Abdomen Medial;Upper (Active)       Urethral Catheter 16 fr (Active)       [REMOVED] Urethral Catheter (Removed)       Findings:   Incisional hernia found and repaired with retro rectus mesh placement.     Electronically signed by Zayda Marquez DO on 9/1/2021 at 2:13 PM

## 2021-09-01 NOTE — PROGRESS NOTES
PACU Transfer Note    Vitals:    09/01/21 1645   BP: 120/71   Pulse: 88   Resp: 15   Temp: 97.7 °F (36.5 °C)   SpO2: 99%       In: 346 [I.V.:346]  Out: 170 [Urine:155; Drains:15]    Pain assessment:  present - adequately treated  Pain Level: 5    Report given to Receiving unit RN. All belongings sent with patient to inpatient room (0029). Heriberto Harrison informed personally of patient going to inpatient room. No further changes.      9/1/2021 5:13 PM

## 2021-09-01 NOTE — PROGRESS NOTES
4 Eyes Admission Assessment     I agree as the admission nurse that 2 RN's have performed a thorough Head to Toe Skin Assessment on the patient. ALL assessment sites listed below have been assessed on admission. Areas assessed by both nurses:   [x]   Head, Face, and Ears   [x]   Shoulders, Back, and Chest  [x]   Arms, Elbows, and Hands   [x]   Coccyx, Sacrum, and Ischium  [x]   Legs, Feet, and Heels        Does the Patient have Skin Breakdown?   No         Noble Prevention initiated:  No   Wound Care Orders initiated:  No      [facility] nurse consulted for Pressure Injury (Stage 3,4, Unstageable, DTI, NWPT, and Complex wounds) or Noble score 18 or lower:  No      Nurse 1 eSignature: Electronically signed by Sergei Story RN on 9/1/21 at 7:31 PM EDT    **SHARE this note so that the co-signing nurse is able to place an eSignature**    Nurse 2 eSignature: Electronically signed by Jerri Anthony RN on 9/2/21 at 5:11 PM EDT

## 2021-09-01 NOTE — PROGRESS NOTES
Pt arrived from OR, s/p OPEN INCISIONAL HERNIA REPAIR, pt moaning and in pain on arrival, pt received 2 mg of dilaudid in OR, pt having abdominal muscle spasms, Dr. Santiago Single aware, meds ordered

## 2021-09-02 LAB
ALBUMIN SERPL-MCNC: 3.9 G/DL (ref 3.4–5)
ANION GAP SERPL CALCULATED.3IONS-SCNC: 14 MMOL/L (ref 3–16)
BASOPHILS ABSOLUTE: 0 K/UL (ref 0–0.2)
BASOPHILS RELATIVE PERCENT: 0.1 %
BUN BLDV-MCNC: 21 MG/DL (ref 7–20)
CALCIUM SERPL-MCNC: 9.9 MG/DL (ref 8.3–10.6)
CHLORIDE BLD-SCNC: 101 MMOL/L (ref 99–110)
CO2: 21 MMOL/L (ref 21–32)
CREAT SERPL-MCNC: 0.8 MG/DL (ref 0.6–1.2)
EOSINOPHILS ABSOLUTE: 0 K/UL (ref 0–0.6)
EOSINOPHILS RELATIVE PERCENT: 0 %
GFR AFRICAN AMERICAN: >60
GFR NON-AFRICAN AMERICAN: >60
GLUCOSE BLD-MCNC: 162 MG/DL (ref 70–99)
GLUCOSE BLD-MCNC: 187 MG/DL (ref 70–99)
GLUCOSE BLD-MCNC: 191 MG/DL (ref 70–99)
GLUCOSE BLD-MCNC: 195 MG/DL (ref 70–99)
GLUCOSE BLD-MCNC: 206 MG/DL (ref 70–99)
GLUCOSE BLD-MCNC: 211 MG/DL (ref 70–99)
HCT VFR BLD CALC: 35.3 % (ref 36–48)
HEMOGLOBIN: 12.2 G/DL (ref 12–16)
LYMPHOCYTES ABSOLUTE: 1.7 K/UL (ref 1–5.1)
LYMPHOCYTES RELATIVE PERCENT: 14.8 %
MAGNESIUM: 1.8 MG/DL (ref 1.8–2.4)
MCH RBC QN AUTO: 33 PG (ref 26–34)
MCHC RBC AUTO-ENTMCNC: 34.6 G/DL (ref 31–36)
MCV RBC AUTO: 95.4 FL (ref 80–100)
MONOCYTES ABSOLUTE: 1.2 K/UL (ref 0–1.3)
MONOCYTES RELATIVE PERCENT: 10.6 %
NEUTROPHILS ABSOLUTE: 8.4 K/UL (ref 1.7–7.7)
NEUTROPHILS RELATIVE PERCENT: 74.5 %
PDW BLD-RTO: 13.5 % (ref 12.4–15.4)
PERFORMED ON: ABNORMAL
PHOSPHORUS: 4.8 MG/DL (ref 2.5–4.9)
PLATELET # BLD: 246 K/UL (ref 135–450)
PMV BLD AUTO: 6.9 FL (ref 5–10.5)
POTASSIUM SERPL-SCNC: 4.6 MMOL/L (ref 3.5–5.1)
RBC # BLD: 3.71 M/UL (ref 4–5.2)
SODIUM BLD-SCNC: 136 MMOL/L (ref 136–145)
WBC # BLD: 11.2 K/UL (ref 4–11)

## 2021-09-02 PROCEDURE — 85025 COMPLETE CBC W/AUTO DIFF WBC: CPT

## 2021-09-02 PROCEDURE — 6370000000 HC RX 637 (ALT 250 FOR IP): Performed by: STUDENT IN AN ORGANIZED HEALTH CARE EDUCATION/TRAINING PROGRAM

## 2021-09-02 PROCEDURE — 36415 COLL VENOUS BLD VENIPUNCTURE: CPT

## 2021-09-02 PROCEDURE — 96376 TX/PRO/DX INJ SAME DRUG ADON: CPT

## 2021-09-02 PROCEDURE — 96365 THER/PROPH/DIAG IV INF INIT: CPT

## 2021-09-02 PROCEDURE — 97116 GAIT TRAINING THERAPY: CPT

## 2021-09-02 PROCEDURE — 2580000003 HC RX 258: Performed by: STUDENT IN AN ORGANIZED HEALTH CARE EDUCATION/TRAINING PROGRAM

## 2021-09-02 PROCEDURE — 83735 ASSAY OF MAGNESIUM: CPT

## 2021-09-02 PROCEDURE — 6360000002 HC RX W HCPCS: Performed by: NURSE PRACTITIONER

## 2021-09-02 PROCEDURE — 6360000002 HC RX W HCPCS

## 2021-09-02 PROCEDURE — G0378 HOSPITAL OBSERVATION PER HR: HCPCS

## 2021-09-02 PROCEDURE — 6370000000 HC RX 637 (ALT 250 FOR IP): Performed by: NURSE PRACTITIONER

## 2021-09-02 PROCEDURE — 97530 THERAPEUTIC ACTIVITIES: CPT

## 2021-09-02 PROCEDURE — 2580000003 HC RX 258: Performed by: NURSE PRACTITIONER

## 2021-09-02 PROCEDURE — 6360000002 HC RX W HCPCS: Performed by: STUDENT IN AN ORGANIZED HEALTH CARE EDUCATION/TRAINING PROGRAM

## 2021-09-02 PROCEDURE — 97535 SELF CARE MNGMENT TRAINING: CPT

## 2021-09-02 PROCEDURE — 80069 RENAL FUNCTION PANEL: CPT

## 2021-09-02 PROCEDURE — 96366 THER/PROPH/DIAG IV INF ADDON: CPT

## 2021-09-02 PROCEDURE — 97161 PT EVAL LOW COMPLEX 20 MIN: CPT

## 2021-09-02 PROCEDURE — 96372 THER/PROPH/DIAG INJ SC/IM: CPT

## 2021-09-02 PROCEDURE — 2580000003 HC RX 258

## 2021-09-02 PROCEDURE — 96367 TX/PROPH/DG ADDL SEQ IV INF: CPT

## 2021-09-02 PROCEDURE — 97166 OT EVAL MOD COMPLEX 45 MIN: CPT

## 2021-09-02 PROCEDURE — 96375 TX/PRO/DX INJ NEW DRUG ADDON: CPT

## 2021-09-02 PROCEDURE — 6370000000 HC RX 637 (ALT 250 FOR IP)

## 2021-09-02 RX ORDER — OXYCODONE HYDROCHLORIDE 5 MG/1
10 TABLET ORAL EVERY 4 HOURS PRN
Status: DISCONTINUED | OUTPATIENT
Start: 2021-09-02 | End: 2021-09-05 | Stop reason: HOSPADM

## 2021-09-02 RX ORDER — LORAZEPAM 0.5 MG/1
0.5 TABLET ORAL 2 TIMES DAILY
Status: DISCONTINUED | OUTPATIENT
Start: 2021-09-02 | End: 2021-09-05 | Stop reason: HOSPADM

## 2021-09-02 RX ORDER — ZOLPIDEM TARTRATE 5 MG/1
10 TABLET ORAL NIGHTLY
Status: DISCONTINUED | OUTPATIENT
Start: 2021-09-02 | End: 2021-09-05 | Stop reason: HOSPADM

## 2021-09-02 RX ORDER — ALBUTEROL SULFATE 2.5 MG/3ML
2.5 SOLUTION RESPIRATORY (INHALATION) EVERY 6 HOURS PRN
Status: DISCONTINUED | OUTPATIENT
Start: 2021-09-02 | End: 2021-09-02

## 2021-09-02 RX ORDER — MAGNESIUM SULFATE IN WATER 40 MG/ML
2000 INJECTION, SOLUTION INTRAVENOUS ONCE
Status: COMPLETED | OUTPATIENT
Start: 2021-09-02 | End: 2021-09-02

## 2021-09-02 RX ORDER — AMLODIPINE BESYLATE 10 MG/1
10 TABLET ORAL DAILY
Status: DISCONTINUED | OUTPATIENT
Start: 2021-09-02 | End: 2021-09-05 | Stop reason: HOSPADM

## 2021-09-02 RX ORDER — LIDOCAINE 4 G/G
1 PATCH TOPICAL DAILY
Status: COMPLETED | OUTPATIENT
Start: 2021-09-02 | End: 2021-09-03

## 2021-09-02 RX ORDER — OXYCODONE HYDROCHLORIDE 5 MG/1
5 TABLET ORAL EVERY 4 HOURS PRN
Status: DISCONTINUED | OUTPATIENT
Start: 2021-09-02 | End: 2021-09-05 | Stop reason: HOSPADM

## 2021-09-02 RX ADMIN — HYDROMORPHONE HYDROCHLORIDE 1 MG: 1 INJECTION, SOLUTION INTRAMUSCULAR; INTRAVENOUS; SUBCUTANEOUS at 03:22

## 2021-09-02 RX ADMIN — ACETAMINOPHEN 1000 MG: 500 TABLET, FILM COATED ORAL at 05:30

## 2021-09-02 RX ADMIN — ZOLPIDEM TARTRATE 10 MG: 5 TABLET ORAL at 20:59

## 2021-09-02 RX ADMIN — METHOCARBAMOL 1000 MG: 100 INJECTION, SOLUTION INTRAMUSCULAR; INTRAVENOUS at 09:30

## 2021-09-02 RX ADMIN — Medication 5 ML: at 07:50

## 2021-09-02 RX ADMIN — DULOXETINE HYDROCHLORIDE 20 MG: 20 CAPSULE, DELAYED RELEASE ORAL at 09:20

## 2021-09-02 RX ADMIN — OXYCODONE 10 MG: 5 TABLET ORAL at 09:20

## 2021-09-02 RX ADMIN — METHOCARBAMOL 1000 MG: 100 INJECTION, SOLUTION INTRAMUSCULAR; INTRAVENOUS at 22:18

## 2021-09-02 RX ADMIN — FAMOTIDINE 20 MG: 20 TABLET ORAL at 20:58

## 2021-09-02 RX ADMIN — LORAZEPAM 0.5 MG: 0.5 TABLET ORAL at 20:58

## 2021-09-02 RX ADMIN — METHOCARBAMOL 1000 MG: 100 INJECTION, SOLUTION INTRAMUSCULAR; INTRAVENOUS at 15:55

## 2021-09-02 RX ADMIN — MAGNESIUM SULFATE HEPTAHYDRATE 2000 MG: 2 INJECTION, SOLUTION INTRAVENOUS at 10:45

## 2021-09-02 RX ADMIN — ENOXAPARIN SODIUM 40 MG: 40 INJECTION SUBCUTANEOUS at 17:54

## 2021-09-02 RX ADMIN — LORAZEPAM 0.5 MG: 0.5 TABLET ORAL at 09:19

## 2021-09-02 RX ADMIN — FAMOTIDINE 20 MG: 20 TABLET ORAL at 09:20

## 2021-09-02 RX ADMIN — LURASIDONE HYDROCHLORIDE 40 MG: 40 TABLET, FILM COATED ORAL at 15:52

## 2021-09-02 RX ADMIN — INSULIN LISPRO 2 UNITS: 100 INJECTION, SOLUTION INTRAVENOUS; SUBCUTANEOUS at 17:55

## 2021-09-02 RX ADMIN — Medication 10 ML: at 20:59

## 2021-09-02 RX ADMIN — INSULIN LISPRO 1 UNITS: 100 INJECTION, SOLUTION INTRAVENOUS; SUBCUTANEOUS at 12:15

## 2021-09-02 RX ADMIN — ACETAMINOPHEN 1000 MG: 500 TABLET, FILM COATED ORAL at 00:09

## 2021-09-02 RX ADMIN — AMLODIPINE BESYLATE 10 MG: 10 TABLET ORAL at 09:19

## 2021-09-02 RX ADMIN — METHOCARBAMOL 500 MG: 100 INJECTION, SOLUTION INTRAMUSCULAR; INTRAVENOUS at 00:09

## 2021-09-02 RX ADMIN — OXYCODONE 10 MG: 5 TABLET ORAL at 15:52

## 2021-09-02 RX ADMIN — ACETAMINOPHEN 1000 MG: 500 TABLET, FILM COATED ORAL at 12:16

## 2021-09-02 RX ADMIN — HYDROMORPHONE HYDROCHLORIDE 1 MG: 1 INJECTION, SOLUTION INTRAMUSCULAR; INTRAVENOUS; SUBCUTANEOUS at 08:18

## 2021-09-02 RX ADMIN — ATORVASTATIN CALCIUM 40 MG: 40 TABLET, FILM COATED ORAL at 09:20

## 2021-09-02 ASSESSMENT — PAIN SCALES - GENERAL
PAINLEVEL_OUTOF10: 10
PAINLEVEL_OUTOF10: 10
PAINLEVEL_OUTOF10: 8
PAINLEVEL_OUTOF10: 9
PAINLEVEL_OUTOF10: 8
PAINLEVEL_OUTOF10: 10
PAINLEVEL_OUTOF10: 10

## 2021-09-02 ASSESSMENT — PAIN DESCRIPTION - ORIENTATION
ORIENTATION: MID

## 2021-09-02 ASSESSMENT — PAIN DESCRIPTION - DESCRIPTORS
DESCRIPTORS: DISCOMFORT
DESCRIPTORS: ACHING;SORE;DISCOMFORT
DESCRIPTORS: DISCOMFORT

## 2021-09-02 ASSESSMENT — PAIN DESCRIPTION - LOCATION
LOCATION: ABDOMEN

## 2021-09-02 ASSESSMENT — PAIN DESCRIPTION - FREQUENCY: FREQUENCY: CONTINUOUS

## 2021-09-02 ASSESSMENT — PAIN DESCRIPTION - PAIN TYPE: TYPE: SURGICAL PAIN

## 2021-09-02 NOTE — PROGRESS NOTES
General Surgery  Post-operative Note      Procedure(s) Performed: open incisional hernia repair    Subjective:   Patient's pain is controlled, denies nausea or vomiting. Tolerating diet. OOB, ambulating and voiding appropriately. Denies flatus or BM at this time. Objective:  Anesthesia type: General      I/O    Intra op    Post op     Fluids  1000 mL 500 mL      mL 0 mL     Urine 20 mL 155 mL     Vitals:   Vitals:    09/01/21 1630 09/01/21 1645 09/01/21 1741 09/01/21 1831   BP: (!) 146/83 120/71 (!) 143/73    Pulse: 89 88 79    Resp: 18 15     Temp:  97.7 °F (36.5 °C) 97.5 °F (36.4 °C)    TempSrc:  Temporal Oral    SpO2: 98% 99% 96% 96%   Weight:       Height:           Physical Exam:  Post-op vital signs:  Stable   General appearance: alert, no acute distress, grooming appropriate  Eyes: No scleral icterus, EOM grossly intact  Neck: trachea midline, no JVD, no lymphadenopathy, neck supple  Chest/Lungs: Normal effort with no accessory muscle use on RA  Cardiovascular: RRR, extremities well perfused  Abdomen: Soft, appropriately tender, incision with Prevena wound vac in place with no alarming, holding adequate suction, ROZINA drain with minimal SS output, abdominal binder in place. Skin: warm and dry, no rashes  Extremities: no edema, no cyanosis  Genitourinary: Damon in place with clear yellow urine  Neuro: A&Ox3, no focal deficits, sensation intact    Assessment and Plan  This is a 74y.o. year old female status post open incisional hernia repair secondary to incisional hernia without obstruction or gangrene. (9/1) POD0.     Pain management: Dilaudid pain panel and scheduled robaxin  Cardiovasc: hemodynamically stable, will continue to monitor  Respiratory:  IS ordered to bedside, encourage hourly IS and deep breathing, wean oxygen as tolerated  Fluids:  None, Diet: Clear liquid diet  : Urine output is adequate   Ambulation: OOB to chair, encourage ambulation  Prophylaxis: SCDs, lovenox  Antibiotics: Ancef received in the perioperative period. Wound: Local wound care, abdominal binder to remain in place.       Zelda Porter DO, MS  PGY1, General Surgery  09/01/21  10:13 PM  [phone]

## 2021-09-02 NOTE — PROGRESS NOTES
Occupational Therapy   Occupational Therapy Initial Assessment/Treatment  Date: 2021   Patient Name: Harshad Eden  MRN: [id number]     : 1955    Date of Service: 2021    Discharge Recommendations:      Harshad Eden scored a 17/24 on the AM-PAC ADL Inpatient form. Current research shows that an AM-PAC score of 18 or greater is typically associated with a discharge to the patient's home setting. Please see assessment section for further patient specific details. If patient discharges prior to next session this note will serve as a discharge summary. Please see below for the latest assessment towards goals. OT Equipment Recommendations  Equipment Needed:  (To further assess)    Assessment   Performance deficits / Impairments: Decreased functional mobility ; Decreased ADL status; Decreased endurance;Decreased balance;Decreased posture  Assessment: Pt presents with deficits in functional mobility and ADLs. Pt was initially hesitant to participate in therapy but was agreeable, stating that she wanted to get back in bed after session. After ambulating to bathroom with CGA and RW, completing toilet transfer with CGA, and grooming tasks at sink with CGA, pt was agreeable to ambulating in hallway (w/CGA and RW). At end of session, pt was agreeable to sitting in chair. Pt's improvement in pain and affect after participating in therapy indicates potential for continued participation in ADLs. Pt has good support system at home with grandson. Pt will continue to receive therapy at this facility in order to maximize ADL independence for safety at home. Treatment Diagnosis: Impaired functional mobility and ADL  Prognosis: Good  Decision Making: Medium Complexity  OT Education: OT Role;Plan of Care;Precautions; ADL Adaptive Strategies;Transfer Training;Energy Conservation  Patient Education: Pt demonstrated understanding of education  REQUIRES OT FOLLOW UP: Yes  Activity Tolerance  Activity Tolerance: Patient Tolerated treatment well  Activity Tolerance: Pt was initially hesitant to participate in therapy, however reported feeling much better after getting out of bed. Safety Devices  Safety Devices in place: Yes  Type of devices: Call light within reach; Left in chair;Chair alarm in place;Nurse notified         Treatment Diagnosis: Impaired functional mobility and ADL      Restrictions  Restrictions/Precautions  Restrictions/Precautions: Up as Tolerated    Subjective   General  Chart Reviewed: Yes  Additional Pertinent Hx: Pt was admitted 9/1/21 after undergoing surgery for repair of incisional hernia without ostruction or gangrene. PMH includes ischemic bowel disease, anxiety, DM, diverticulitis, hep C, kidney stones, LBBB, loose stools, paranoid schizophrenia, proteinuria, rectal prolapse, urinary retention, sciatica, and sleep apnea (CPAP). PSH includes rectal prolapse repair, colpopexy, abdomen surgery, hysterectomy, B TKR, and kidney stone surgery x2. Family / Caregiver Present: No  Referring Practitioner: Dr. Ivelisse Foster  Diagnosis: Incisional hernia without obstruction or gangrene  Subjective  Subjective: Pt was seen in bed upon arrival. Pt stated \"I'm not going to push it today\". Patient Currently in Pain: Yes (Nurse aware)  Pain Assessment  Pain Level: 9  Pain Location: Abdomen  Social/Functional History  Social/Functional History  Lives With: Alone (grandson will stay with pt at discharge)  Type of Home: Apartment  Home Layout: One level  Bathroom Shower/Tub: Tub/Shower unit  Bathroom Toilet: Standard (sink nearby)  Home Equipment: Cane (not using DME PTA)  Receives Help From:  (MOW, every other week housekeeping)  ADL Assistance: Independent  Homemaking Assistance:  (has  weekly)  Ambulation Assistance: Independent  Transfer Assistance: Independent  Active : No  Occupation: Retired  Additional Comments: Pt denies any recent falls.      Objective   Vision: Impaired  Vision Exceptions: Wears glasses for distance  Hearing: Within functional limits    Orientation  Overall Orientation Status: Within Normal Limits     Balance  Sitting Balance: Stand by assistance  Standing Balance: Contact guard assistance  Standing Balance  Time: ~10 min  Activity: Ambulating to/from bathroom, grooming tasks at sink, ambulating in hallway  Functional Mobility  Functional - Mobility Device: Rolling Walker  Activity: To/from bathroom (Ambulating in hallway)  Assist Level: Contact guard assistance  Toilet Transfers  Toilet - Technique: Ambulating  Equipment Used: Standard toilet (Grab bar)  Toilet Transfer: Contact guard assistance  ADL  Grooming: Contact guard assistance (Pt brushed teeth, brushed hair, and washed face while standing at sink with CGA)  LE Dressing: Stand by assistance (Pt doffed and donned socks with SBA while seated in recliner)  Toileting:  (Catheter)  Tone RUE  RUE Tone: Normotonic  Tone LUE  LUE Tone: Normotonic  Coordination  Movements Are Fluid And Coordinated: Yes     Bed mobility  Supine to Sit: Moderate assistance (HOB elevated, use of bed rail and therapist assist. Pt utilized log roll technique.)  Scooting: Stand by assistance  Transfers  Stand Step Transfers: Contact guard assistance  Sit to stand: Contact guard assistance  Stand to sit: Contact guard assistance     Cognition  Overall Cognitive Status: WNL                 LUE AROM (degrees)  LUE AROM : WFL  RUE AROM (degrees)  RUE AROM : WFL  LUE Strength  Gross LUE Strength: WFL  RUE Strength  Gross RUE Strength: WFL      Pt was seen for OT evaluation and treatment including ADL training and functional mobility.         Plan   Plan  Times per week: 2-5  Current Treatment Recommendations: Strengthening, Functional Mobility Training, Endurance Training, Balance Training, Safety Education & Training, Patient/Caregiver Education & Training, Self-Care / ADL    AM-PAC Score        AM-PAC Inpatient Daily Activity Raw Score: 17 (09/02/21 8266)  AM-PAC Inpatient ADL T-Scale Score : 37.26 (09/02/21 6159)  ADL Inpatient CMS 0-100% Score: 50.11 (09/02/21 1892)  ADL Inpatient CMS G-Code Modifier : CK (09/02/21 0115)    Goals    No goals met  Short term goals  Time Frame for Short term goals: Discharge  Short term goal 1: Pt will stand with SBA x10 minutes to complete grooming tasks/functional mobility  Short term goal 2: Pt will complete chair/toilet transfers with supervision  Patient Goals   Patient goals : Return home with assist from [facility]       Therapy Time   Individual Concurrent Group Co-treatment   Time In 0820         Time Out 0900         Minutes 40         Timed Code Treatment Minutes: 30 Minutes    Total Treatment Time: 120 [facility]  A licensed therapist was present, directed the patient's care, made skilled judgement, and was responsible for assessment and treatment of the patient.

## 2021-09-02 NOTE — PROGRESS NOTES
Pt A/Ox4, VSS. ROZINA remains in place with llittle output. Damon remains with clear yellow output. Wound vac in place. Pain tolerated with 1x IV and 2x PO medication. Ambulated in henning twice today, up in chair twice. Weaned to room air. Continued clear liquid diet; appetite increased throughout the day; denies nausea. All needs met at this time.

## 2021-09-02 NOTE — PROGRESS NOTES
Physical Therapy    Facility/Department: [facility]'[address] SURGERY  Initial Assessment    NAME: Manish Maradiaga  : 1955  MRN: [id number]    Date of Service: 2021    Discharge Recommendations:Cherelle Levy scored a 17/24 on the AM-PAC short mobility form. Current research shows that an AM-PAC score of 18 or greater is typically associated with a discharge to the patient's home setting. Based on the patient's AM-PAC score and their current functional mobility deficits, it is recommended that the patient have 2-3 sessions per week of Physical Therapy at d/c to increase the patient's independence. At this time, this patient demonstrates the endurance and safety to discharge home with  (home  services) and a follow up treatment frequency of 2-3x/wk. Please see assessment section for further patient specific details. If patient discharges prior to next session this note will serve as a discharge summary. Please see below for the latest assessment towards goals. PT Equipment Recommendations  Equipment Needed:  (rolling walker if home)    Assessment   Assessment: 75 yo admitted 19 for hernia repair. Pt demo  mobility below her reported baseline of independent at home without DME. Pt mainly limited by pain control this am. Anticipate pt will continue to progress well with continued pain control. Pt plans to return home at discharge with 24 hour assist from dbei. IF home, recommend 24 hour supervision initially, use of , home PT. Treatment Diagnosis: mobiltiy impairment due to hernia repair  Decision Making: Medium Complexity  Patient Education: Pt educated on PT role, importance of OOB mobility, need to call for assist to get up and she verbalized understanding. REQUIRES PT FOLLOW UP: Yes  Activity Tolerance  Activity Tolerance: Patient limited by pain; Patient Tolerated treatment well       Patient Diagnosis(es): The encounter diagnosis was Incisional hernia without obstruction or gangrene. has a past medical history of Anxiety, generalized, CPAP (continuous positive airway pressure) dependence, Diabetes mellitus ([address] Diverticulitis, H/O ischemic bowel disease, Hepatitis C, History of blood transfusion, History of kidney stones, Hyperlipidemia, LBBB (left bundle branch block), Loose stools, Paranoid schizophrenia ([address] Proteinuria, Rectal prolapse, Retention of urine, Sciatica, Sleep apnea, Sleep apnea, and Wears partial dentures. has a past surgical history that includes Kidney stone surgery; knee surgery; Hysterectomy; other surgical history (3/5/16); Colonoscopy; [place] tooth extraction; Dilation and curettage of uterus; Kidney stone surgery; Abdomen surgery (09/20/2016); pr colonoscopy flx dx w/collj spec when pfrmd (N/A, 10/25/2018); Colpopexy (N/A, 4/26/2019); Cystoscopy (Bilateral, 4/26/2019); Rectal prolapse repair (N/A, 3/23/2021); joint replacement (Bilateral); and ventral hernia repair (N/A, 9/1/2021). Restrictions  Restrictions/Precautions  Restrictions/Precautions: Up as Tolerated  Position Activity Restriction  Other position/activity restrictions: up as tolerated     Vision/Hearing  Vision: Impaired  Vision Exceptions: Wears glasses for distance  Hearing: Within functional limits       Subjective  General  Chart Reviewed: Yes  Additional Pertinent Hx: 74 y.o. female admitted to observation 9/1/21 for  open incisional hernia repair 2/2 incisional hernia. Pmhx: DM, TRINA on CPAP, paranoid schizophrenia. Family / Caregiver Present: No  Diagnosis: open incisional hernia repair  Follows Commands: Within Functional Limits  Subjective  Subjective: Pt found supine in bed and agreeeable to PT with encouragement. \" I'm in so much pain-I'm not sure I can get up.  \"  Pain Screening  Patient Currently in Pain: Yes (rates pain 10/10 initially, RN aware)         Orientation  Orientation  Overall Orientation Status: Within Functional Limits     Social/Functional History  Social/Functional History  Lives With: Alone (grandson will stay with pt at discharge)  Type of Home: Apartment  Home Layout: One level  Bathroom Shower/Tub: Tub/Shower unit  Bathroom Toilet: Standard (sink nearby)  Home Equipment: Cane (not using DME PTA)  Receives Help From:  (MOW, every other week housekeeping)  ADL Assistance: Independent  Homemaking Assistance:  (has  weekly)  Ambulation Assistance: Independent  Transfer Assistance: Independent  Active : No  Occupation: Retired  Additional Comments: Pt denies any recent falls.     Objective          AROM RLE (degrees)  RLE AROM: WFL  AROM LLE (degrees)  LLE AROM : WFL     Strength RLE  Strength RLE: WFL  Strength LLE  Strength LLE: WFL     Bed mobility: supine to sit mod assist with HOB up,heavy use of rail via log roll technique           Transfers  Sit to Stand: Contact guard assistance (x2 trials)  Stand to sit: Contact guard assistance (x2 trials)     Ambulation  Device: Rolling Walker  Assistance: Contact guard assistance  Quality of Gait: slow, steady gait with decreased step length/step  height; no overt LOB noted  Distance: 10 ft, 40 ft              Plan   Plan  Times per week: 2-5  Current Treatment Recommendations: Strengthening, Transfer Training, Endurance Training, Functional Mobility Training, Gait Training  Safety Devices  Type of devices: Nurse notified, Call light within reach, Chair alarm in place, Left in chair                          AM-PAC Score  AM-PAC Inpatient Mobility Raw Score : 17 (09/02/21 1027)  AM-PAC Inpatient T-Scale Score : 42.13 (09/02/21 1027)  Mobility Inpatient CMS 0-100% Score: 50.57 (09/02/21 1027)  Mobility Inpatient CMS G-Code Modifier : CK (09/02/21 1027)          Goals  Short term goals  Time Frame for Short term goals: discharge  Short term goal 1: sit to/from supine supervision  Short term goal 2: sit to/from stand supervision  Short term goal 3: ambulate 200 ft with RW supervision  Patient Goals   Patient goals : return home       Therapy Time   Individual Concurrent Group Co-treatment   Time In 0815         Time Out 0900         Minutes 45           Timed Code Treatment Minutes:35       Total Treatment Minutes:  [address] Em, PT

## 2021-09-02 NOTE — PROGRESS NOTES
General Surgery   Daily Progress Note  Patient: Asif Bolanos      CC: Open Incisional Hernia Repair    SUBJECTIVE:   Patient rested for a few hours overnight. She reports chills, productive cough along with intermittent abdominal muscle spasms that have been keeping her awake. She denies fever, nausea or vomiting at this time but also denies appetite, flatus, bowel movements. Patient has been belching and voiding appropriately since the surgery. She reports she is too worried about the abdominal spasms to try and get OOB or attempt ambulation. ROS:   A 14 point review of systems was conducted, significant findings as noted above. All other systems negative. OBJECTIVE:    PHYSICAL EXAM:    Vitals:    09/01/21 1741 09/01/21 1831 09/01/21 2241 09/02/21 0320   BP: (!) 143/73  134/89 (!) 143/76   Pulse: 79  74 79   Resp:   16 16   Temp: 97.5 °F (36.4 °C)  98.4 °F (36.9 °C)    TempSrc: Oral  Oral    SpO2: 96% 96%     Weight:       Height:           General appearance: alert, grooming appropriate  Eyes: No scleral icterus, EOM grossly intact  Neck: trachea midline, no JVD, no lymphadenopathy, neck supple  Chest/Lungs: CTAB, no crackles/rales, wheezes/rhonchi, normal effort with no accessory muscle use on 3L NC  Cardiovascular: RRR, no murmurs/gallops/rubs audible  Abdomen: Obese, moderate tenderness in all quadrants, incision with Prevena wound vac in place with no alarming, holding adequate suction, ROZINA drain with minimal SS output (17 cc), abdominal binder in place  Skin: warm and dry, no rashes  Extremities: no edema, no cyanosis  Genitourinary: Damon in place with clear yellow urine  Neuro: A&Ox3, no focal deficits, sensation intact    LABS:   No results for input(s): WBC, HGB, HCT, MCV, PLT in the last 72 hours. No results for input(s): NA, K, CL, CO2, PHOS, BUN, CREATININE in the last 72 hours.     Invalid input(s): CA   No results for input(s): AST, ALT, ALB, BILIDIR, BILITOT, ALKPHOS in the last 72 hours. No results for input(s): LIPASE, AMYLASE in the last 72 hours. No results for input(s): PROT, INR, APTT in the last 72 hours. No results for input(s): CKTOTAL, CKMB, CKMBINDEX, TROPONINI in the last 72 hours. ASSESSMENT & PLAN:   This is a 74 y.o. female s/p open incisional hernia repair 2/2 incisional hernia without obstruction or gangrene POD #1 (9/1). Pain management: Dilaudid IV PRN, Robaxin increased to 1000mg, Roxicodone pain panel added  Cardiovasc: hemodynamically stable, will continue to monitor  Respiratory:  IS ordered to bedside, encourage hourly IS and deep breathing, wean oxygen as tolerated  Fluids:  None, Diet: Clear liquid diet  : Urine output is adequate; cont Damon catheter  Ambulation: OOB to chair, encourage ambulation; consult PT/OT  Prophylaxis: SCDs, lovenox  Wound: Local wound care, recheck prevena and ROZINA drain to confirm good suction, abdominal binder to remain in place. [address] [place] [address], General Surgery  09/02/21  5:20 AM     Addendum:  Patient seen and examined with Medical Student and Surgical Team.  Agree with assessment and plan with changes made accordingly. Christina Shah DO  PGY-1, General Surgery  09/02/21  6:17 AM  [phone]    I have seen, examined, and reviewed the patients chart. I agree with the residents assessment and have made appropriate changes.     David Crocker

## 2021-09-02 NOTE — CARE COORDINATION
Cm following, pt from home alone with family support, plans to DC home no needs anticipated at [facility]. Pt POD#1, working on pain control on IV and PO pain meds, FC in place as well as ROZINA and Elva Rodgers, pt on 3L O2, will need to wean to RA. Anticipate as pt progresses will meet markers to return home no needs. Will cont to follow for needs at [facility].   Electronically signed by Madison Espinoza RN on 9/2/2021 at 12:04 PM  [phone]

## 2021-09-03 LAB
ALBUMIN SERPL-MCNC: 3.9 G/DL (ref 3.4–5)
ANION GAP SERPL CALCULATED.3IONS-SCNC: 11 MMOL/L (ref 3–16)
BASOPHILS ABSOLUTE: 0 K/UL (ref 0–0.2)
BASOPHILS RELATIVE PERCENT: 0.3 %
BUN BLDV-MCNC: 17 MG/DL (ref 7–20)
CALCIUM SERPL-MCNC: 9.5 MG/DL (ref 8.3–10.6)
CHLORIDE BLD-SCNC: 97 MMOL/L (ref 99–110)
CO2: 26 MMOL/L (ref 21–32)
CREAT SERPL-MCNC: 0.7 MG/DL (ref 0.6–1.2)
EOSINOPHILS ABSOLUTE: 0 K/UL (ref 0–0.6)
EOSINOPHILS RELATIVE PERCENT: 0.2 %
GFR AFRICAN AMERICAN: >60
GFR NON-AFRICAN AMERICAN: >60
GLUCOSE BLD-MCNC: 226 MG/DL (ref 70–99)
GLUCOSE BLD-MCNC: 226 MG/DL (ref 70–99)
GLUCOSE BLD-MCNC: 233 MG/DL (ref 70–99)
GLUCOSE BLD-MCNC: 233 MG/DL (ref 70–99)
GLUCOSE BLD-MCNC: 261 MG/DL (ref 70–99)
HCT VFR BLD CALC: 32.2 % (ref 36–48)
HEMOGLOBIN: 11.3 G/DL (ref 12–16)
LYMPHOCYTES ABSOLUTE: 2.4 K/UL (ref 1–5.1)
LYMPHOCYTES RELATIVE PERCENT: 24.6 %
MAGNESIUM: 1.9 MG/DL (ref 1.8–2.4)
MCH RBC QN AUTO: 33.8 PG (ref 26–34)
MCHC RBC AUTO-ENTMCNC: 35.1 G/DL (ref 31–36)
MCV RBC AUTO: 96.4 FL (ref 80–100)
MONOCYTES ABSOLUTE: 1.2 K/UL (ref 0–1.3)
MONOCYTES RELATIVE PERCENT: 12.7 %
NEUTROPHILS ABSOLUTE: 6 K/UL (ref 1.7–7.7)
NEUTROPHILS RELATIVE PERCENT: 62.2 %
PDW BLD-RTO: 13.4 % (ref 12.4–15.4)
PERFORMED ON: ABNORMAL
PHOSPHORUS: 3 MG/DL (ref 2.5–4.9)
PLATELET # BLD: 209 K/UL (ref 135–450)
PMV BLD AUTO: 7.4 FL (ref 5–10.5)
POTASSIUM SERPL-SCNC: 4.4 MMOL/L (ref 3.5–5.1)
RBC # BLD: 3.34 M/UL (ref 4–5.2)
SODIUM BLD-SCNC: 134 MMOL/L (ref 136–145)
WBC # BLD: 9.6 K/UL (ref 4–11)

## 2021-09-03 PROCEDURE — 94669 MECHANICAL CHEST WALL OSCILL: CPT

## 2021-09-03 PROCEDURE — 96372 THER/PROPH/DIAG INJ SC/IM: CPT

## 2021-09-03 PROCEDURE — 36415 COLL VENOUS BLD VENIPUNCTURE: CPT

## 2021-09-03 PROCEDURE — 94150 VITAL CAPACITY TEST: CPT

## 2021-09-03 PROCEDURE — 94640 AIRWAY INHALATION TREATMENT: CPT

## 2021-09-03 PROCEDURE — 6370000000 HC RX 637 (ALT 250 FOR IP): Performed by: NURSE PRACTITIONER

## 2021-09-03 PROCEDURE — 80069 RENAL FUNCTION PANEL: CPT

## 2021-09-03 PROCEDURE — 83735 ASSAY OF MAGNESIUM: CPT

## 2021-09-03 PROCEDURE — 6360000002 HC RX W HCPCS: Performed by: STUDENT IN AN ORGANIZED HEALTH CARE EDUCATION/TRAINING PROGRAM

## 2021-09-03 PROCEDURE — 2700000000 HC OXYGEN THERAPY PER DAY

## 2021-09-03 PROCEDURE — 6370000000 HC RX 637 (ALT 250 FOR IP): Performed by: STUDENT IN AN ORGANIZED HEALTH CARE EDUCATION/TRAINING PROGRAM

## 2021-09-03 PROCEDURE — 94761 N-INVAS EAR/PLS OXIMETRY MLT: CPT

## 2021-09-03 PROCEDURE — 6370000000 HC RX 637 (ALT 250 FOR IP)

## 2021-09-03 PROCEDURE — 6370000000 HC RX 637 (ALT 250 FOR IP): Performed by: SURGERY

## 2021-09-03 PROCEDURE — 96366 THER/PROPH/DIAG IV INF ADDON: CPT

## 2021-09-03 PROCEDURE — G0378 HOSPITAL OBSERVATION PER HR: HCPCS

## 2021-09-03 PROCEDURE — 96376 TX/PRO/DX INJ SAME DRUG ADON: CPT

## 2021-09-03 PROCEDURE — 6360000002 HC RX W HCPCS: Performed by: NURSE PRACTITIONER

## 2021-09-03 PROCEDURE — 85025 COMPLETE CBC W/AUTO DIFF WBC: CPT

## 2021-09-03 PROCEDURE — 2580000003 HC RX 258: Performed by: STUDENT IN AN ORGANIZED HEALTH CARE EDUCATION/TRAINING PROGRAM

## 2021-09-03 RX ORDER — MAGNESIUM SULFATE 1 G/100ML
1000 INJECTION INTRAVENOUS ONCE
Status: COMPLETED | OUTPATIENT
Start: 2021-09-03 | End: 2021-09-03

## 2021-09-03 RX ORDER — IPRATROPIUM BROMIDE AND ALBUTEROL SULFATE 2.5; .5 MG/3ML; MG/3ML
1 SOLUTION RESPIRATORY (INHALATION)
Status: DISCONTINUED | OUTPATIENT
Start: 2021-09-03 | End: 2021-09-05 | Stop reason: HOSPADM

## 2021-09-03 RX ADMIN — ACETAMINOPHEN 1000 MG: 500 TABLET, FILM COATED ORAL at 16:43

## 2021-09-03 RX ADMIN — OXYCODONE 10 MG: 5 TABLET ORAL at 15:11

## 2021-09-03 RX ADMIN — ENOXAPARIN SODIUM 40 MG: 40 INJECTION SUBCUTANEOUS at 16:43

## 2021-09-03 RX ADMIN — INSULIN LISPRO 2 UNITS: 100 INJECTION, SOLUTION INTRAVENOUS; SUBCUTANEOUS at 18:14

## 2021-09-03 RX ADMIN — INSULIN LISPRO 2 UNITS: 100 INJECTION, SOLUTION INTRAVENOUS; SUBCUTANEOUS at 12:49

## 2021-09-03 RX ADMIN — HYDROMORPHONE HYDROCHLORIDE 0.5 MG: 1 INJECTION, SOLUTION INTRAMUSCULAR; INTRAVENOUS; SUBCUTANEOUS at 16:44

## 2021-09-03 RX ADMIN — AMLODIPINE BESYLATE 10 MG: 10 TABLET ORAL at 08:28

## 2021-09-03 RX ADMIN — OXYCODONE 10 MG: 5 TABLET ORAL at 07:14

## 2021-09-03 RX ADMIN — Medication 10 ML: at 08:21

## 2021-09-03 RX ADMIN — Medication 10 ML: at 20:37

## 2021-09-03 RX ADMIN — LORAZEPAM 0.5 MG: 0.5 TABLET ORAL at 08:28

## 2021-09-03 RX ADMIN — ZOLPIDEM TARTRATE 10 MG: 5 TABLET ORAL at 20:29

## 2021-09-03 RX ADMIN — INSULIN LISPRO 2 UNITS: 100 INJECTION, SOLUTION INTRAVENOUS; SUBCUTANEOUS at 08:18

## 2021-09-03 RX ADMIN — ACETAMINOPHEN 1000 MG: 500 TABLET, FILM COATED ORAL at 12:49

## 2021-09-03 RX ADMIN — IPRATROPIUM BROMIDE AND ALBUTEROL SULFATE 1 AMPULE: .5; 2.5 SOLUTION RESPIRATORY (INHALATION) at 22:22

## 2021-09-03 RX ADMIN — IPRATROPIUM BROMIDE AND ALBUTEROL SULFATE 1 AMPULE: .5; 2.5 SOLUTION RESPIRATORY (INHALATION) at 11:30

## 2021-09-03 RX ADMIN — FAMOTIDINE 20 MG: 20 TABLET ORAL at 20:29

## 2021-09-03 RX ADMIN — LURASIDONE HYDROCHLORIDE 40 MG: 40 TABLET, FILM COATED ORAL at 08:17

## 2021-09-03 RX ADMIN — ATORVASTATIN CALCIUM 40 MG: 40 TABLET, FILM COATED ORAL at 08:28

## 2021-09-03 RX ADMIN — IPRATROPIUM BROMIDE AND ALBUTEROL SULFATE 1 AMPULE: .5; 2.5 SOLUTION RESPIRATORY (INHALATION) at 14:46

## 2021-09-03 RX ADMIN — IPRATROPIUM BROMIDE AND ALBUTEROL SULFATE 1 AMPULE: .5; 2.5 SOLUTION RESPIRATORY (INHALATION) at 08:32

## 2021-09-03 RX ADMIN — LORAZEPAM 0.5 MG: 0.5 TABLET ORAL at 20:29

## 2021-09-03 RX ADMIN — MAGNESIUM SULFATE HEPTAHYDRATE 1000 MG: 1 INJECTION, SOLUTION INTRAVENOUS at 08:30

## 2021-09-03 RX ADMIN — DULOXETINE HYDROCHLORIDE 20 MG: 20 CAPSULE, DELAYED RELEASE ORAL at 08:28

## 2021-09-03 RX ADMIN — ACETAMINOPHEN 1000 MG: 500 TABLET, FILM COATED ORAL at 05:26

## 2021-09-03 RX ADMIN — FAMOTIDINE 20 MG: 20 TABLET ORAL at 08:28

## 2021-09-03 RX ADMIN — ACETAMINOPHEN 1000 MG: 500 TABLET, FILM COATED ORAL at 01:06

## 2021-09-03 ASSESSMENT — PAIN SCALES - GENERAL
PAINLEVEL_OUTOF10: 4
PAINLEVEL_OUTOF10: 0
PAINLEVEL_OUTOF10: 3
PAINLEVEL_OUTOF10: 8
PAINLEVEL_OUTOF10: 10
PAINLEVEL_OUTOF10: 8
PAINLEVEL_OUTOF10: 8
PAINLEVEL_OUTOF10: 10

## 2021-09-03 ASSESSMENT — PAIN DESCRIPTION - LOCATION
LOCATION: ABDOMEN

## 2021-09-03 ASSESSMENT — PAIN DESCRIPTION - FREQUENCY
FREQUENCY: INTERMITTENT
FREQUENCY: INTERMITTENT

## 2021-09-03 ASSESSMENT — PAIN DESCRIPTION - DESCRIPTORS
DESCRIPTORS: PRESSURE
DESCRIPTORS: SPASM

## 2021-09-03 ASSESSMENT — PAIN DESCRIPTION - PAIN TYPE
TYPE: SURGICAL PAIN
TYPE: ACUTE PAIN;SURGICAL PAIN
TYPE: SURGICAL PAIN

## 2021-09-03 ASSESSMENT — PAIN DESCRIPTION - ONSET: ONSET: ON-GOING

## 2021-09-03 ASSESSMENT — PAIN DESCRIPTION - ORIENTATION: ORIENTATION: MID

## 2021-09-03 NOTE — PLAN OF CARE
Problem: Falls - Risk of:  Goal: Will remain free from falls  Description: Will remain free from falls  Outcome: Ongoing  Note: SBA w/ walker. Non skid socks on. Tolerates well. Appropriate safety awareness. Problem: Pain:  Goal: Pain level will decrease  Description: Pain level will decrease  Outcome: Ongoing  Note: Given 2 doses PO Oxy between 7a-7p. 1 dose 0.5mg Dilaudid given. Sched'd Tylenol given. Pain now more controlled.  Lidocaine patch on abd as well

## 2021-09-03 NOTE — PROGRESS NOTES
Sugars remain in 200s. Ambulated whole loop and half of a loop today w/ walker-tolerated well. Up to chair and restroom multiple times. Voiding after mandel out. No gas or BM yet. Eats little amounts from clear food tray. Tolerating fluids. Bowel tones hypoactive. Prevena in place-no drainage. Purple foam CDI. ROZINA with 40ml bloody output. Achieved 500 on IS. Lungs diminished. >90% room air. Doing Acapella as well.    VSS, A+Ox4

## 2021-09-03 NOTE — PROGRESS NOTES
Admit Date: 9/1/2021  Post op incisional hernia repair     Subjective:     Patient doing well this morning. Awaiting return of bowel function. Pain well controlled. Scheduled Meds:   ipratropium-albuterol  1 ampule Inhalation Q4H WA    amLODIPine  10 mg Oral Daily    LORazepam  0.5 mg Oral BID    zolpidem  10 mg Oral Nightly    lurasidone  40 mg Oral Daily    lidocaine  1 patch TransDERmal Daily    famotidine  20 mg Oral BID    atorvastatin  40 mg Oral Daily    DULoxetine  20 mg Oral Daily    sodium chloride flush  5-40 mL IntraVENous 2 times per day    enoxaparin  40 mg SubCUTAneous Daily    acetaminophen  1,000 mg Oral Q6H    insulin lispro  0-6 Units SubCUTAneous Q6H     Continuous Infusions:   sodium chloride      dextrose         Objective:     Patient Vitals for the past 8 hrs:   BP Temp Temp src Pulse Resp SpO2   09/03/21 1130 -- -- -- -- -- 94 %   09/03/21 1047 (!) 151/81 98 °F (36.7 °C) Oral 103 16 92 %   09/03/21 0833 -- -- -- -- -- 93 %   09/03/21 0802 (!) 143/73 98.2 °F (36.8 °C) Oral 85 16 93 %     I/O last 3 completed shifts: In: 883.5 [P.O.:540; IV Piggyback:343.5]  Out: 9525 [Urine:5450; Drains:70]  I/O this shift: In: 549.9 [P.O.:240; I.V.:114.9; IV Piggyback:195.1]  Out: 220 [Urine:200; Drains:20]    GEN: appears well, no distress, appears stated age  PSYCH: normal mood, normal affect  NECK: no neck masses, trachea midline  ENT: moist oral mucosa; anicteric  SKIN: no rash or jaundice  CV: regular heart rate and rhythm  PULM: normal respiratory effort, no wheezing  GI: soft appropriately tender abdomen.  Normal bowel sounds  RECTAL: deferred   EXT/NEURO: normal gait, strength/sensation grossly intact in all extremities      Assessment:         Ventral incisional hernia repair        Plan:     Await return of bowel function    Payton Dean M.D.  9/3/21   1:19 PM

## 2021-09-03 NOTE — CARE COORDINATION
Cm following, Awaiting return of GI function, cont to be on CLD at this time. Plans to DC home no needs once GI function returns, no CM needs noted. Pt up ambulating improved once pain controlled. Debbie Reyes and ROZINA at MD.   Electronically signed by Dianna Pool RN on 9/3/2021 at 2:39 PM  [phone]

## 2021-09-03 NOTE — PROGRESS NOTES
Damon catheter to be discontinued per order. Procedure explained,pt agreeable and verbalized understanding. Balloon deflated and removed intact. 200cc of clear  Yellow urine present in bag. Pt tolerated procedure well. Will continue to monitor.

## 2021-09-03 NOTE — PROGRESS NOTES
General Surgery   Daily Progress Note  Patient: Vivienne Anderson      CC: Open Incisional Hernia Repair    SUBJECTIVE:   Patient rested well overnight. HDS and without significant pain this AM. She denies any fever, nausea, vomiting, along with improvement in phlegm production. Also denies any flatus or bowel movements. Voiding urine in mandel (~ 500 cc last 12 hrs) and has been belching. Tolerating sips of water, jello, and Nimo Mist soda without discomfort. ROS:   A 14 point review of systems was conducted, significant findings as noted above. All other systems negative. OBJECTIVE:    PHYSICAL EXAM:    Vitals:    09/02/21 1550 09/02/21 2048 09/02/21 2051 09/03/21 0408   BP: 120/61 (!) 138/95  129/82   Pulse: 78 84  82   Resp: 16 16  16   Temp: 98.2 °F (36.8 °C) 98.3 °F (36.8 °C)  98 °F (36.7 °C)   TempSrc: Oral Oral  Axillary   SpO2: 91%  (!) 89% 94%   Weight:       Height:           General appearance: alert, lying in bed comfortably this AM, no acute distress, grooming appropriate  Eyes: No scleral icterus, EOM grossly intact  Neck: trachea midline, no JVD, no lymphadenopathy, neck supple  Chest/Lungs: CTAB, no crackles/rales, wheezes/rhonchi, normal effort with no accessory muscle use on 2L NC  Cardiovascular: RRR, no murmurs/gallops/rubs, S1 and S2 noted, well perfused  Abdomen: Obese, appropriate mild tenderness in all quadrants mostly localized around prevena, incision with Prevena wound vac in place with no alarming, holding adequate suction, ROZINA drain with minimal SS output, abdominal binder in place  Skin: warm and dry, no rashes  Extremities: no edema, no cyanosis  Genitourinary:  Mandel in place with clear yellow urine  Neuro: A&Ox3, no focal deficits, sensation intact    LABS:   Recent Labs     09/02/21  0526   WBC 11.2*   HGB 12.2   HCT 35.3*   MCV 95.4           Recent Labs     09/02/21  0526      K 4.6      CO2 21   PHOS 4.8   BUN 21*   CREATININE 0.8      No results for input(s): AST, ALT, ALB, BILIDIR, BILITOT, ALKPHOS in the last 72 hours. No results for input(s): LIPASE, AMYLASE in the last 72 hours. No results for input(s): PROT, INR, APTT in the last 72 hours. No results for input(s): CKTOTAL, CKMB, CKMBINDEX, TROPONINI in the last 72 hours. ASSESSMENT & PLAN:   This is a 74 y.o. female s/p open incisional hernia repair 2/2 incisional hernia without obstruction or gangrene (9/1) POD #2. Pain management: Dilaudid IV PRN, cont. Robaxin 1000mg, Roxicodone 10 mg PRN  Cardiovasc: hemodynamically stable, cont. Amlodipine 10 mg, monitor BP  Respiratory:  IS ordered to bedside, encourage hourly IS and deep breathing, wean oxygen as tolerated, will order respiratory therapy with acapella, easy pap  Fluids: Encourage fluid intake Diet: Continue CLD, will advance once patient has return of bowel function  : Urine output around 500 cc last 12 hrs; discontinue Damon catheter  Ambulation: encourage OOB to chair, encourage ambulation; cont. PT/OT session  Prophylaxis: SCDs, lovenox  Wound: Local wound care, continue to recheck prevena and ROZINA drain to confirm good suction, abdominal binder to remain in place. ---    Jac Vitale  MS3, General Surgery  09/03/21  5:17 AM     Addendum:  Patient seen and examined with Medical Student and Surgical Team.  Agree with assessment and plan with changes made accordingly.     Tharon Aase, DO  PGY1, General Surgery  09/03/21  6:15 AM  [phone]

## 2021-09-04 PROBLEM — Z87.19 S/P HERNIA REPAIR: Status: ACTIVE | Noted: 2021-09-04

## 2021-09-04 PROBLEM — Z98.890 S/P HERNIA REPAIR: Status: ACTIVE | Noted: 2021-09-04

## 2021-09-04 LAB
ALBUMIN SERPL-MCNC: 4 G/DL (ref 3.4–5)
ANION GAP SERPL CALCULATED.3IONS-SCNC: 13 MMOL/L (ref 3–16)
BUN BLDV-MCNC: 13 MG/DL (ref 7–20)
CALCIUM SERPL-MCNC: 9.6 MG/DL (ref 8.3–10.6)
CHLORIDE BLD-SCNC: 96 MMOL/L (ref 99–110)
CO2: 26 MMOL/L (ref 21–32)
CREAT SERPL-MCNC: 0.6 MG/DL (ref 0.6–1.2)
GFR AFRICAN AMERICAN: >60
GFR NON-AFRICAN AMERICAN: >60
GLUCOSE BLD-MCNC: 211 MG/DL (ref 70–99)
GLUCOSE BLD-MCNC: 217 MG/DL (ref 70–99)
GLUCOSE BLD-MCNC: 235 MG/DL (ref 70–99)
GLUCOSE BLD-MCNC: 238 MG/DL (ref 70–99)
GLUCOSE BLD-MCNC: 241 MG/DL (ref 70–99)
GLUCOSE BLD-MCNC: 257 MG/DL (ref 70–99)
GLUCOSE BLD-MCNC: 276 MG/DL (ref 70–99)
HCT VFR BLD CALC: 31.4 % (ref 36–48)
HEMOGLOBIN: 11 G/DL (ref 12–16)
MAGNESIUM: 1.8 MG/DL (ref 1.8–2.4)
MCH RBC QN AUTO: 33.6 PG (ref 26–34)
MCHC RBC AUTO-ENTMCNC: 35.2 G/DL (ref 31–36)
MCV RBC AUTO: 95.5 FL (ref 80–100)
PDW BLD-RTO: 13.4 % (ref 12.4–15.4)
PERFORMED ON: ABNORMAL
PHOSPHORUS: 2.5 MG/DL (ref 2.5–4.9)
PLATELET # BLD: 210 K/UL (ref 135–450)
PMV BLD AUTO: 7.3 FL (ref 5–10.5)
POTASSIUM SERPL-SCNC: 4.2 MMOL/L (ref 3.5–5.1)
RBC # BLD: 3.29 M/UL (ref 4–5.2)
SODIUM BLD-SCNC: 135 MMOL/L (ref 136–145)
WBC # BLD: 9.9 K/UL (ref 4–11)

## 2021-09-04 PROCEDURE — 96368 THER/DIAG CONCURRENT INF: CPT

## 2021-09-04 PROCEDURE — 1200000000 HC SEMI PRIVATE

## 2021-09-04 PROCEDURE — 96366 THER/PROPH/DIAG IV INF ADDON: CPT

## 2021-09-04 PROCEDURE — 6360000002 HC RX W HCPCS: Performed by: STUDENT IN AN ORGANIZED HEALTH CARE EDUCATION/TRAINING PROGRAM

## 2021-09-04 PROCEDURE — 83735 ASSAY OF MAGNESIUM: CPT

## 2021-09-04 PROCEDURE — 2500000003 HC RX 250 WO HCPCS: Performed by: STUDENT IN AN ORGANIZED HEALTH CARE EDUCATION/TRAINING PROGRAM

## 2021-09-04 PROCEDURE — 6370000000 HC RX 637 (ALT 250 FOR IP): Performed by: NURSE PRACTITIONER

## 2021-09-04 PROCEDURE — 94761 N-INVAS EAR/PLS OXIMETRY MLT: CPT

## 2021-09-04 PROCEDURE — 36415 COLL VENOUS BLD VENIPUNCTURE: CPT

## 2021-09-04 PROCEDURE — 94669 MECHANICAL CHEST WALL OSCILL: CPT

## 2021-09-04 PROCEDURE — 94150 VITAL CAPACITY TEST: CPT

## 2021-09-04 PROCEDURE — 2580000003 HC RX 258: Performed by: STUDENT IN AN ORGANIZED HEALTH CARE EDUCATION/TRAINING PROGRAM

## 2021-09-04 PROCEDURE — 6370000000 HC RX 637 (ALT 250 FOR IP)

## 2021-09-04 PROCEDURE — 2700000000 HC OXYGEN THERAPY PER DAY

## 2021-09-04 PROCEDURE — 6370000000 HC RX 637 (ALT 250 FOR IP): Performed by: STUDENT IN AN ORGANIZED HEALTH CARE EDUCATION/TRAINING PROGRAM

## 2021-09-04 PROCEDURE — 6370000000 HC RX 637 (ALT 250 FOR IP): Performed by: SURGERY

## 2021-09-04 PROCEDURE — 96367 TX/PROPH/DG ADDL SEQ IV INF: CPT

## 2021-09-04 PROCEDURE — 85027 COMPLETE CBC AUTOMATED: CPT

## 2021-09-04 PROCEDURE — 80069 RENAL FUNCTION PANEL: CPT

## 2021-09-04 PROCEDURE — 2580000003 HC RX 258: Performed by: SURGERY

## 2021-09-04 PROCEDURE — 94640 AIRWAY INHALATION TREATMENT: CPT

## 2021-09-04 RX ORDER — MAGNESIUM SULFATE IN WATER 40 MG/ML
2000 INJECTION, SOLUTION INTRAVENOUS ONCE
Status: COMPLETED | OUTPATIENT
Start: 2021-09-04 | End: 2021-09-04

## 2021-09-04 RX ORDER — DOCUSATE SODIUM 100 MG/1
100 CAPSULE, LIQUID FILLED ORAL 2 TIMES DAILY
Status: DISCONTINUED | OUTPATIENT
Start: 2021-09-04 | End: 2021-09-05 | Stop reason: HOSPADM

## 2021-09-04 RX ORDER — INSULIN LISPRO 100 [IU]/ML
0-6 INJECTION, SOLUTION INTRAVENOUS; SUBCUTANEOUS
Status: DISCONTINUED | OUTPATIENT
Start: 2021-09-04 | End: 2021-09-05

## 2021-09-04 RX ORDER — SODIUM CHLORIDE FOR INHALATION 3 %
4 VIAL, NEBULIZER (ML) INHALATION 4 TIMES DAILY
Status: DISCONTINUED | OUTPATIENT
Start: 2021-09-04 | End: 2021-09-05 | Stop reason: HOSPADM

## 2021-09-04 RX ORDER — SODIUM CHLORIDE FOR INHALATION 0.9 %
3 VIAL, NEBULIZER (ML) INHALATION 4 TIMES DAILY
Status: DISCONTINUED | OUTPATIENT
Start: 2021-09-04 | End: 2021-09-04

## 2021-09-04 RX ADMIN — MAGNESIUM SULFATE HEPTAHYDRATE 2000 MG: 2 INJECTION, SOLUTION INTRAVENOUS at 09:40

## 2021-09-04 RX ADMIN — INSULIN LISPRO 2 UNITS: 100 INJECTION, SOLUTION INTRAVENOUS; SUBCUTANEOUS at 11:53

## 2021-09-04 RX ADMIN — INSULIN LISPRO 3 UNITS: 100 INJECTION, SOLUTION INTRAVENOUS; SUBCUTANEOUS at 23:47

## 2021-09-04 RX ADMIN — INSULIN LISPRO 3 UNITS: 100 INJECTION, SOLUTION INTRAVENOUS; SUBCUTANEOUS at 00:06

## 2021-09-04 RX ADMIN — ACETAMINOPHEN 1000 MG: 500 TABLET, FILM COATED ORAL at 05:37

## 2021-09-04 RX ADMIN — ATORVASTATIN CALCIUM 40 MG: 40 TABLET, FILM COATED ORAL at 09:30

## 2021-09-04 RX ADMIN — DOCUSATE SODIUM 100 MG: 100 CAPSULE, LIQUID FILLED ORAL at 11:52

## 2021-09-04 RX ADMIN — IPRATROPIUM BROMIDE AND ALBUTEROL SULFATE 1 AMPULE: .5; 2.5 SOLUTION RESPIRATORY (INHALATION) at 08:05

## 2021-09-04 RX ADMIN — OXYCODONE 10 MG: 5 TABLET ORAL at 23:46

## 2021-09-04 RX ADMIN — AMLODIPINE BESYLATE 10 MG: 10 TABLET ORAL at 09:30

## 2021-09-04 RX ADMIN — ACETAMINOPHEN 1000 MG: 500 TABLET, FILM COATED ORAL at 00:03

## 2021-09-04 RX ADMIN — LURASIDONE HYDROCHLORIDE 40 MG: 40 TABLET, FILM COATED ORAL at 09:42

## 2021-09-04 RX ADMIN — IPRATROPIUM BROMIDE AND ALBUTEROL SULFATE 1 AMPULE: .5; 2.5 SOLUTION RESPIRATORY (INHALATION) at 12:18

## 2021-09-04 RX ADMIN — FAMOTIDINE 20 MG: 20 TABLET ORAL at 20:57

## 2021-09-04 RX ADMIN — ACETAMINOPHEN 1000 MG: 500 TABLET, FILM COATED ORAL at 18:20

## 2021-09-04 RX ADMIN — SODIUM CHLORIDE 30 MG/ML INHALATION SOLUTION 4 ML: 30 SOLUTION INHALANT at 12:18

## 2021-09-04 RX ADMIN — Medication 10 ML: at 20:58

## 2021-09-04 RX ADMIN — SODIUM CHLORIDE 30 MG/ML INHALATION SOLUTION 4 ML: 30 SOLUTION INHALANT at 15:44

## 2021-09-04 RX ADMIN — ENOXAPARIN SODIUM 40 MG: 40 INJECTION SUBCUTANEOUS at 18:20

## 2021-09-04 RX ADMIN — IPRATROPIUM BROMIDE AND ALBUTEROL SULFATE 1 AMPULE: .5; 2.5 SOLUTION RESPIRATORY (INHALATION) at 21:01

## 2021-09-04 RX ADMIN — ZOLPIDEM TARTRATE 10 MG: 5 TABLET ORAL at 20:57

## 2021-09-04 RX ADMIN — LORAZEPAM 0.5 MG: 0.5 TABLET ORAL at 09:30

## 2021-09-04 RX ADMIN — ACETAMINOPHEN 1000 MG: 500 TABLET, FILM COATED ORAL at 23:46

## 2021-09-04 RX ADMIN — FAMOTIDINE 20 MG: 20 TABLET ORAL at 09:30

## 2021-09-04 RX ADMIN — DULOXETINE HYDROCHLORIDE 20 MG: 20 CAPSULE, DELAYED RELEASE ORAL at 09:30

## 2021-09-04 RX ADMIN — LORAZEPAM 0.5 MG: 0.5 TABLET ORAL at 20:57

## 2021-09-04 RX ADMIN — INSULIN LISPRO 2 UNITS: 100 INJECTION, SOLUTION INTRAVENOUS; SUBCUTANEOUS at 05:32

## 2021-09-04 RX ADMIN — DOCUSATE SODIUM 100 MG: 100 CAPSULE, LIQUID FILLED ORAL at 20:57

## 2021-09-04 RX ADMIN — OXYCODONE 10 MG: 5 TABLET ORAL at 18:20

## 2021-09-04 RX ADMIN — Medication 10 ML: at 09:30

## 2021-09-04 RX ADMIN — INSULIN LISPRO 2 UNITS: 100 INJECTION, SOLUTION INTRAVENOUS; SUBCUTANEOUS at 18:20

## 2021-09-04 RX ADMIN — IPRATROPIUM BROMIDE AND ALBUTEROL SULFATE 1 AMPULE: .5; 2.5 SOLUTION RESPIRATORY (INHALATION) at 15:44

## 2021-09-04 RX ADMIN — SODIUM PHOSPHATE, MONOBASIC, MONOHYDRATE 10 MMOL: 276; 142 INJECTION, SOLUTION INTRAVENOUS at 09:36

## 2021-09-04 ASSESSMENT — PAIN SCALES - GENERAL
PAINLEVEL_OUTOF10: 0
PAINLEVEL_OUTOF10: 10
PAINLEVEL_OUTOF10: 9
PAINLEVEL_OUTOF10: 9

## 2021-09-04 ASSESSMENT — PAIN DESCRIPTION - ORIENTATION
ORIENTATION: MID
ORIENTATION: MID

## 2021-09-04 ASSESSMENT — PAIN DESCRIPTION - PAIN TYPE
TYPE: SURGICAL PAIN
TYPE: SURGICAL PAIN

## 2021-09-04 ASSESSMENT — PAIN DESCRIPTION - LOCATION
LOCATION: ABDOMEN
LOCATION: ABDOMEN

## 2021-09-04 ASSESSMENT — PAIN DESCRIPTION - FREQUENCY
FREQUENCY: INTERMITTENT
FREQUENCY: CONTINUOUS

## 2021-09-04 ASSESSMENT — PAIN DESCRIPTION - ONSET
ONSET: ON-GOING
ONSET: GRADUAL

## 2021-09-04 ASSESSMENT — PAIN - FUNCTIONAL ASSESSMENT: PAIN_FUNCTIONAL_ASSESSMENT: ACTIVITIES ARE NOT PREVENTED

## 2021-09-04 ASSESSMENT — PAIN DESCRIPTION - DESCRIPTORS
DESCRIPTORS: ACHING;SHARP
DESCRIPTORS: ACHING;SHOOTING

## 2021-09-04 ASSESSMENT — PAIN DESCRIPTION - PROGRESSION: CLINICAL_PROGRESSION: NOT CHANGED

## 2021-09-04 NOTE — PROGRESS NOTES
Patient alert and oriented x4. VSS. Patient prevena wound vac intact with no issues noted. ROZINA with minimal bloody output throughout shift. Patient urinating with no issues. SBA to the bathroom and walked in halls. Tolerating diet with no issues. Denies any pain throughout shift. Patient denies any other needs at this time. Bed is in the lowest position, call light and bedside table within reach. Patient bed alarm is on. Will continue to monitor for changes in patient status.      Electronically signed by Alexx Segal RN on 9/4/2021 at 5:48 PM

## 2021-09-04 NOTE — RT PROTOCOL NOTE
RT Inhaler-Nebulizer Bronchodilator Protocol Note    There is a bronchodilator order in the chart from a provider indicating to follow the RT Bronchodilator Protocol and there is an Initiate RT Bronchodilator Protocol order as well (see protocol at bottom of note). The findings from the last RT Protocol Assessment were as follows:  Smoking: Non smoker  Surgical Status: Upper abdominal  Xray: Clear  Respiratory Pattern: RR 12-20  Mental Status: Alert and Oriented  Breath Sounds: Diminished and/or crackles  Cough: Strong, productive  Activity Level: Walking with assistance  Oxygen Requirement: Room Air - 2LNC/28% or home setting  Indication for Bronchodilator Therapy: On home bronchodilators  Bronchodilator Assessment Score: 2    Aerosolized bronchodilator medication orders have not been revised according to the RT Bronchodilator Protocol. Pt. Receiving 3% NACL QID. RT Inhaler-Nebulizer Bronchodilator Protocol:    Respiratory Therapist to perform RT Therapy Protocol Assessment then follow the protocol. No Indications - adjust the frequency to every 6 hours PRN wheezing or bronchospasm, if no treatments needed after 48 hours then discontinue using Per Protocol order mode. If indication present, adjust the RT bronchodilator orders based on the Bronchodilator Assessment Score as follows:    0-6 - enter or revise RT bronchodilator order to Albuterol Inhaler order with frequency of every 2 hours PRN for wheezing or increased work of breathing using Per Protocol order mode. If Albuterol Inhaler not tolerated or not effective, then discontinue the Albuterol Inhaler order and enter Albuterol Nebulizer order with same frequency and PRN reasons. Repeat RT Therapy Protocol Assessment as needed.     7-10 - discontinue any other Inpatient aerosolized bronchodilator medication orders and enter or revise two Albuterol Inhaler orders, one with BID frequency and one with frequency of every 2 hours PRN wheezing or increased work of breathing using Per Protocol order mode. Repeat RT Therapy Protocol Assessment with second treatment then BID and as needed. If Albuterol Inhaler not tolerated or not effective, then discontinue the Albuterol Inhaler orders and enter two Albuterol Nebulizer orders with same frequencies and PRN reasons. 11-13 - discontinue any other Inpatient aerosolized bronchodilator medication orders and enter DuoNeb Nebulizer orders QID frequency and an Albuterol Nebulizer order every 2 hours PRN wheezing or increased work of breathing using Per Protocol order mode. Repeat RT Therapy Protocol Assessment with second treatment then QID and as needed. Greater than 13 - discontinue any other Inpatient bronchodilator aerosolized medication orders and enter DuoNeb Nebulizer order every 4 hours frequency and Albuterol Nebulizer every 2 hours PRN wheezing or increased work of breathing using Per Protocol order mode. Repeat RT Therapy Protocol Assessment with second treatment then every 4 hours and as needed. RT to enter RT Home Evaluation for COPD & MDI Assessment order using Per Protocol order mode.     Electronically signed by Earl Saenz RCP on 9/4/2021 at 1:20 PM

## 2021-09-04 NOTE — PROGRESS NOTES
General Surgery   Daily Progress Note  Patient: Daphney Combs      CC: Open Incisional Hernia Repair    SUBJECTIVE:   Damon removed yesterday and voiding appropriately. Patient rested well overnight. Pain is well-controlled. Patient is tolerating clears w/o nausea or vomiting. Patient denies flatus or BMs. Patient is afebrile and HDS.    ROS:   A 14 point review of systems was conducted, significant findings as noted above. All other systems negative. OBJECTIVE:    PHYSICAL EXAM:    Vitals:    09/03/21 2015 09/03/21 2225 09/03/21 2233 09/04/21 0330   BP:   (!) 143/78 (!) 152/82   Pulse:   83 77   Resp:   14 14   Temp:   97.9 °F (36.6 °C) 97.1 °F (36.2 °C)   TempSrc:   Oral Temporal   SpO2: 95% 94% 96% 91%   Weight:       Height:           General appearance: alert, lying in bed comfortably this AM, no acute distress, grooming appropriate  Eyes: No scleral icterus, EOM grossly intact  Neck: trachea midline, no JVD, no lymphadenopathy, neck supple  Chest/Lungs: normal effort with no accessory muscle use on RA; symmetric chest rise  Cardiovascular: RRR, well perfused  Abdomen: Obese, appropriate mild tenderness in all quadrants mostly localized around prevena, incision with Prevena wound vac, holding adequate suction, ROZINA drain with minimal SS output, abdominal binder in place  Skin: warm and dry, no rashes  Extremities: no edema, no cyanosis  Neuro: A&Ox3, no focal deficits, sensation intact    LABS:   Recent Labs     09/03/21  0503 09/04/21  0555   WBC 9.6 9.9   HGB 11.3* 11.0*   HCT 32.2* 31.4*   MCV 96.4 95.5    210        Recent Labs     09/02/21  0526 09/03/21  0503    134*   K 4.6 4.4    97*   CO2 21 26   PHOS 4.8 3.0   BUN 21* 17   CREATININE 0.8 0.7      No results for input(s): AST, ALT, ALB, BILIDIR, BILITOT, ALKPHOS in the last 72 hours. No results for input(s): LIPASE, AMYLASE in the last 72 hours. No results for input(s): PROT, INR, APTT in the last 72 hours.    No results for input(s): CKTOTAL, CKMB, CKMBINDEX, TROPONINI in the last 72 hours. ASSESSMENT & PLAN:   This is a 74 y.o. female s/p open incisional hernia repair 2/2 incisional hernia without obstruction or gangrene (9/1) POD #3.     - will advance to regular diet today ; will cont to monitor for BMs  - PO pain meds  - SLIV  - cont aggressive respiratory care  - encourage OOB to chair, encourage ambulation;   - cont.  PT/OT session  - if patient does well with diet and has BMs, will anticipate discharge today vs tomorrow      Dima Munoz DO  PGY-1, General Surgery  09/04/21  6:44 AM  [phone]

## 2021-09-04 NOTE — PLAN OF CARE
Problem: Pain:  Goal: Control of acute pain  Description: Control of acute pain  Outcome: Ongoing     Pt assessed for pain q 2 hours. Pt expressed pain was tolerable and denied need for prn pain medications.

## 2021-09-05 VITALS
OXYGEN SATURATION: 93 % | RESPIRATION RATE: 16 BRPM | HEIGHT: 59 IN | HEART RATE: 81 BPM | DIASTOLIC BLOOD PRESSURE: 76 MMHG | TEMPERATURE: 98.4 F | BODY MASS INDEX: 34.27 KG/M2 | WEIGHT: 170 LBS | SYSTOLIC BLOOD PRESSURE: 142 MMHG

## 2021-09-05 PROBLEM — K43.9 VENTRAL HERNIA WITHOUT OBSTRUCTION OR GANGRENE: Status: ACTIVE | Noted: 2021-09-05

## 2021-09-05 LAB
ALBUMIN SERPL-MCNC: 3.6 G/DL (ref 3.4–5)
ANION GAP SERPL CALCULATED.3IONS-SCNC: 16 MMOL/L (ref 3–16)
BASOPHILS ABSOLUTE: 0 K/UL (ref 0–0.2)
BASOPHILS RELATIVE PERCENT: 0.2 %
BUN BLDV-MCNC: 21 MG/DL (ref 7–20)
CALCIUM SERPL-MCNC: 9.4 MG/DL (ref 8.3–10.6)
CHLORIDE BLD-SCNC: 97 MMOL/L (ref 99–110)
CO2: 23 MMOL/L (ref 21–32)
CREAT SERPL-MCNC: 0.8 MG/DL (ref 0.6–1.2)
EOSINOPHILS ABSOLUTE: 0.1 K/UL (ref 0–0.6)
EOSINOPHILS RELATIVE PERCENT: 0.6 %
GFR AFRICAN AMERICAN: >60
GFR NON-AFRICAN AMERICAN: >60
GLUCOSE BLD-MCNC: 224 MG/DL (ref 70–99)
GLUCOSE BLD-MCNC: 236 MG/DL (ref 70–99)
HCT VFR BLD CALC: 31.6 % (ref 36–48)
HEMOGLOBIN: 11.2 G/DL (ref 12–16)
LYMPHOCYTES ABSOLUTE: 2 K/UL (ref 1–5.1)
LYMPHOCYTES RELATIVE PERCENT: 21.5 %
MAGNESIUM: 2.1 MG/DL (ref 1.8–2.4)
MCH RBC QN AUTO: 33.8 PG (ref 26–34)
MCHC RBC AUTO-ENTMCNC: 35.3 G/DL (ref 31–36)
MCV RBC AUTO: 95.6 FL (ref 80–100)
MONOCYTES ABSOLUTE: 1.3 K/UL (ref 0–1.3)
MONOCYTES RELATIVE PERCENT: 14.3 %
NEUTROPHILS ABSOLUTE: 5.8 K/UL (ref 1.7–7.7)
NEUTROPHILS RELATIVE PERCENT: 63.4 %
PDW BLD-RTO: 13.4 % (ref 12.4–15.4)
PERFORMED ON: ABNORMAL
PHOSPHORUS: 2.8 MG/DL (ref 2.5–4.9)
PLATELET # BLD: 241 K/UL (ref 135–450)
PMV BLD AUTO: 7.5 FL (ref 5–10.5)
POTASSIUM SERPL-SCNC: 3.7 MMOL/L (ref 3.5–5.1)
RBC # BLD: 3.3 M/UL (ref 4–5.2)
SODIUM BLD-SCNC: 136 MMOL/L (ref 136–145)
WBC # BLD: 9.1 K/UL (ref 4–11)

## 2021-09-05 PROCEDURE — 83735 ASSAY OF MAGNESIUM: CPT

## 2021-09-05 PROCEDURE — 94669 MECHANICAL CHEST WALL OSCILL: CPT

## 2021-09-05 PROCEDURE — 94640 AIRWAY INHALATION TREATMENT: CPT

## 2021-09-05 PROCEDURE — 6370000000 HC RX 637 (ALT 250 FOR IP)

## 2021-09-05 PROCEDURE — 85025 COMPLETE CBC W/AUTO DIFF WBC: CPT

## 2021-09-05 PROCEDURE — 49568 PR IMPLANT MESH HERNIA REPAIR/DEBRIDEMENT CLOSURE: CPT | Performed by: SURGERY

## 2021-09-05 PROCEDURE — 2580000003 HC RX 258

## 2021-09-05 PROCEDURE — 49561 PR REPAIR INCISIONAL HERNIA,STRANG: CPT | Performed by: SURGERY

## 2021-09-05 PROCEDURE — 6370000000 HC RX 637 (ALT 250 FOR IP): Performed by: STUDENT IN AN ORGANIZED HEALTH CARE EDUCATION/TRAINING PROGRAM

## 2021-09-05 PROCEDURE — 6370000000 HC RX 637 (ALT 250 FOR IP): Performed by: SURGERY

## 2021-09-05 PROCEDURE — 15734 MUSCLE-SKIN GRAFT TRUNK: CPT | Performed by: SURGERY

## 2021-09-05 PROCEDURE — 94150 VITAL CAPACITY TEST: CPT

## 2021-09-05 PROCEDURE — 36415 COLL VENOUS BLD VENIPUNCTURE: CPT

## 2021-09-05 PROCEDURE — 6370000000 HC RX 637 (ALT 250 FOR IP): Performed by: NURSE PRACTITIONER

## 2021-09-05 PROCEDURE — 2580000003 HC RX 258: Performed by: SURGERY

## 2021-09-05 PROCEDURE — 80069 RENAL FUNCTION PANEL: CPT

## 2021-09-05 PROCEDURE — 2500000003 HC RX 250 WO HCPCS

## 2021-09-05 RX ORDER — INSULIN LISPRO 100 [IU]/ML
0-18 INJECTION, SOLUTION INTRAVENOUS; SUBCUTANEOUS
Status: DISCONTINUED | OUTPATIENT
Start: 2021-09-05 | End: 2021-09-05 | Stop reason: HOSPADM

## 2021-09-05 RX ORDER — OXYCODONE HYDROCHLORIDE 5 MG/1
5 TABLET ORAL EVERY 6 HOURS PRN
Qty: 28 TABLET | Refills: 0 | Status: SHIPPED | OUTPATIENT
Start: 2021-09-05 | End: 2021-09-12

## 2021-09-05 RX ORDER — INSULIN LISPRO 100 [IU]/ML
0-9 INJECTION, SOLUTION INTRAVENOUS; SUBCUTANEOUS NIGHTLY
Status: DISCONTINUED | OUTPATIENT
Start: 2021-09-05 | End: 2021-09-05 | Stop reason: HOSPADM

## 2021-09-05 RX ORDER — GLIPIZIDE 5 MG/1
5 TABLET, FILM COATED, EXTENDED RELEASE ORAL
Status: DISCONTINUED | OUTPATIENT
Start: 2021-09-05 | End: 2021-09-05 | Stop reason: HOSPADM

## 2021-09-05 RX ADMIN — LURASIDONE HYDROCHLORIDE 40 MG: 40 TABLET, FILM COATED ORAL at 09:33

## 2021-09-05 RX ADMIN — OXYCODONE 10 MG: 5 TABLET ORAL at 04:20

## 2021-09-05 RX ADMIN — FAMOTIDINE 20 MG: 20 TABLET ORAL at 09:34

## 2021-09-05 RX ADMIN — POTASSIUM BICARBONATE 30 MEQ: 782 TABLET, EFFERVESCENT ORAL at 09:34

## 2021-09-05 RX ADMIN — ATORVASTATIN CALCIUM 40 MG: 40 TABLET, FILM COATED ORAL at 09:33

## 2021-09-05 RX ADMIN — SODIUM CHLORIDE 30 MG/ML INHALATION SOLUTION 4 ML: 30 SOLUTION INHALANT at 08:47

## 2021-09-05 RX ADMIN — POTASSIUM PHOSPHATE, MONOBASIC POTASSIUM PHOSPHATE, DIBASIC 10 MMOL: 224; 236 INJECTION, SOLUTION, CONCENTRATE INTRAVENOUS at 06:56

## 2021-09-05 RX ADMIN — LORAZEPAM 0.5 MG: 0.5 TABLET ORAL at 09:34

## 2021-09-05 RX ADMIN — GLIPIZIDE 5 MG: 5 TABLET, FILM COATED, EXTENDED RELEASE ORAL at 09:35

## 2021-09-05 RX ADMIN — IPRATROPIUM BROMIDE AND ALBUTEROL SULFATE 1 AMPULE: .5; 2.5 SOLUTION RESPIRATORY (INHALATION) at 08:47

## 2021-09-05 RX ADMIN — DULOXETINE HYDROCHLORIDE 20 MG: 20 CAPSULE, DELAYED RELEASE ORAL at 09:34

## 2021-09-05 RX ADMIN — AMLODIPINE BESYLATE 10 MG: 10 TABLET ORAL at 09:34

## 2021-09-05 RX ADMIN — ACETAMINOPHEN 1000 MG: 500 TABLET, FILM COATED ORAL at 06:52

## 2021-09-05 RX ADMIN — INSULIN LISPRO 6 UNITS: 100 INJECTION, SOLUTION INTRAVENOUS; SUBCUTANEOUS at 09:36

## 2021-09-05 ASSESSMENT — PAIN DESCRIPTION - ONSET: ONSET: ON-GOING

## 2021-09-05 ASSESSMENT — PAIN SCALES - GENERAL
PAINLEVEL_OUTOF10: 8
PAINLEVEL_OUTOF10: 0
PAINLEVEL_OUTOF10: 0
PAINLEVEL_OUTOF10: 7
PAINLEVEL_OUTOF10: 0

## 2021-09-05 ASSESSMENT — PAIN - FUNCTIONAL ASSESSMENT: PAIN_FUNCTIONAL_ASSESSMENT: ACTIVITIES ARE NOT PREVENTED

## 2021-09-05 ASSESSMENT — PAIN DESCRIPTION - PAIN TYPE: TYPE: SURGICAL PAIN

## 2021-09-05 ASSESSMENT — PAIN DESCRIPTION - ORIENTATION: ORIENTATION: MID;LEFT;LOWER

## 2021-09-05 ASSESSMENT — PAIN DESCRIPTION - PROGRESSION: CLINICAL_PROGRESSION: NOT CHANGED

## 2021-09-05 ASSESSMENT — PAIN DESCRIPTION - FREQUENCY: FREQUENCY: CONTINUOUS

## 2021-09-05 ASSESSMENT — PAIN DESCRIPTION - LOCATION: LOCATION: ABDOMEN

## 2021-09-05 ASSESSMENT — PAIN DESCRIPTION - DESCRIPTORS: DESCRIPTORS: ACHING;DISCOMFORT

## 2021-09-05 NOTE — PROGRESS NOTES
[facility] notified RN of pt's HR up in 130's. Upon entering room, pt up in the restroom after having large bowel incontinence on the floor. Pt sitting on toilet repeatedly apologizing stating \"it just came out so fast.\" Assisted pt in getting cleaned up, linens changed. Pt now back in chair. HR trending down. Will continue to monitor.

## 2021-09-05 NOTE — PROGRESS NOTES
General Surgery   Daily Progress Note  Patient: Paulina Felipe      CC: Open Incisional Hernia Repair    SUBJECTIVE:   Patient rested well overnight. Tolerating a diet with no nausea or vomiting. Having BMs and passing flatus. Remains afebrile and HDS on RA. ROS:   A 14 point review of systems was conducted, significant findings as noted above. All other systems negative. OBJECTIVE:    PHYSICAL EXAM:    Vitals:    09/04/21 1731 09/04/21 2052 09/04/21 2345 09/05/21 0407   BP: (!) 141/78 (!) 149/82 123/68 (!) 151/98   Pulse: 82 90 81 90   Resp: 16 18 17 16   Temp: 98.2 °F (36.8 °C) 98.5 °F (36.9 °C) 98.2 °F (36.8 °C) 98.7 °F (37.1 °C)   TempSrc: Oral Oral Oral Oral   SpO2: 92% 92% 91% 91%   Weight:       Height:           General appearance: alert, lying in bed comfortably this AM, no acute distress, grooming appropriate  Eyes: No scleral icterus, EOM grossly intact  Neck: trachea midline, no JVD, no lymphadenopathy, neck supple  Chest/Lungs: normal effort with no accessory muscle use on RA; symmetric chest rise  Cardiovascular: RRR, well perfused  Abdomen: Obese, appropriate mild tenderness in all quadrants mostly localized around prevena, incision with Prevena wound vac, holding adequate suction, ROZINA drain with minimal SS output, abdominal binder in place  Skin: warm and dry, no rashes  Extremities: no edema, no cyanosis  Neuro: A&Ox3, no focal deficits, sensation intact    LABS:   Recent Labs     09/04/21  0555 09/05/21  0443   WBC 9.9 9.1   HGB 11.0* 11.2*   HCT 31.4* 31.6*   MCV 95.5 95.6    241        Recent Labs     09/04/21  0555 09/05/21  0443   * 136   K 4.2 3.7   CL 96* 97*   CO2 26 23   PHOS 2.5 2.8   BUN 13 21*   CREATININE 0.6 0.8      No results for input(s): AST, ALT, ALB, BILIDIR, BILITOT, ALKPHOS in the last 72 hours. No results for input(s): LIPASE, AMYLASE in the last 72 hours. No results for input(s): PROT, INR, APTT in the last 72 hours.    No results for input(s): CKTOTAL, CKMB, CKMBINDEX, TROPONINI in the last 72 hours. ASSESSMENT & PLAN:   This is a 74 y.o. female s/p open incisional hernia repair 2/2 incisional hernia without obstruction or gangrene (9/1) POD #4.     - PO pain meds  - SLIV   - encourage OOB to chair, encourage ambulation  - cont. PT/OT session  - Pain is well controlled and having multiple BM, will anticipate discharge today, will need additional ROZINA drain teaching and will return to [facility] more [address] [place] [address] teaching. Ana Donovan DO, MS  PGY1, General Surgery  09/05/21  7:00 AM  [phone]      Discussed management with the resident. I reviewed the resident's note and agree with the documented findings and plan of care.     John Hudson M.D.  9/5/21   4:24 PM

## 2021-09-05 NOTE — PROGRESS NOTES
Discharge Progress Note  9/5/2021 11:55 AM    Data:  Discharge order received. Action:  IV D/C'd without difficulty. See Doc Flowsheets for assessment. Patient given discharge instructions with prescriptions. Response:  Patient verbalized understanding of discharge instructions. Patient left with all belongings.     Adrian Andrews RN  ________________________________________________________________________

## 2021-09-05 NOTE — DISCHARGE SUMMARY
Discharge Summary      Patient:    Earle Shahid Date:   9/1/2021  9:28 AM    Discharge Date:   09/05/2021    Admitting Physician:   Livier Nava MD     Discharge Physician:   same    Admitting Diagnosis:  Incisional hernia without obstruction or gangrene    Discharge Diagnosis:   same    Past Medical History:   Diagnosis Date    Anxiety, generalized     CPAP (continuous positive airway pressure) dependence     Diabetes mellitus ([address]     no meds    Diverticulitis     H/O ischemic bowel disease 03/2016    Hepatitis C     History of blood transfusion     s/p hysterectomy    History of kidney stones     x 1 episode    Hyperlipidemia     LBBB (left bundle branch block)     Loose stools     secondary to history of colectomy    Paranoid schizophrenia ([address]     Proteinuria     history of    Rectal prolapse     Retention of urine     Sciatica     Sleep apnea     USES CPAP    Sleep apnea     Wears partial dentures         Indication for Admission:   Patient presented to [facility] to undergo a open incisional hernia repair for an incisional hernia without obstruction or gangrene. Hospital Course:   POD 0 - patient underwent the above stated procedure without acute complications. She recovered as expected in the post-operative period. The patient received Ancef in the swetha-operative period. Her incision was covered with Prevena, which was holding adequate suction. POD 1 - 2  Patient continued to recover as expected, she was able to get OOB to the chair and was ambulating. Continued to have minimal output from ROZINA drain. Abdominal pain was improved. POD 3 - Awaited return of bowel function. Otherwise pain was well controlled, patient encouraged to ambulate. POD 4 - Patient started having bowel function with flatus and BMs. Patient was educated on ROZINA drain and Prevena wound vac. Patient was discharged home in stable condition later this hospital day.      Discharge physical exam:  General appearance: alert, lying in bed comfortably this AM, no acute distress, grooming appropriate  Eyes: No scleral icterus, EOM grossly intact  Neck: trachea midline, no JVD, no lymphadenopathy, neck supple  Chest/Lungs: normal effort with no accessory muscle use on RA; symmetric chest rise  Cardiovascular: RRR, well perfused  Abdomen: Obese, appropriate mild tenderness in all quadrants mostly localized around prevena, incision with Prevena wound vac, holding adequate suction, ROZINA drain with minimal SS output, abdominal binder in place  Skin: warm and dry, no rashes  Extremities: no edema, no cyanosis  Neuro: A&Ox3, no focal deficits, sensation intact    Disposition:    Home    Condition at discharge:  Stable    Discharge Instructions:  See separate form    Patient Instructions:      Medication List      START taking these medications    oxyCODONE 5 MG immediate release tablet  Commonly known as: ROXICODONE  Take 1 tablet by mouth every 6 hours as needed for Pain for up to 7 days. WARNING:  May cause drowsiness. May impair ability to operate vehicles or machinery. Do not use in combination with alcohol.   Notes to patient: Oxycodone   USE--  Treat moderate to severe pain (contains opiate and tylenol)  SIDE EFFECTS--  Upset stomach, feeling sleepy, constipation  CAUTION driving or operating heavy machinery        CHANGE how you take these medications    LORazepam 0.5 MG tablet  Commonly known as: ATIVAN  Take 1 tablet by mouth every 12 hours as needed  What changed: when to take this        CONTINUE taking these medications    acetaminophen 325 MG tablet  Commonly known as: TYLENOL     albuterol sulfate  (90 Base) MCG/ACT inhaler     amLODIPine 10 MG tablet  Commonly known as: NORVASC  Take 1 tablet by mouth daily     atorvastatin 40 MG tablet  Commonly known as: LIPITOR     diphenhydrAMINE 25 MG capsule  Commonly known as: BENADRYL     DULoxetine 20 MG extended release capsule  Commonly known as: CYMBALTA     famotidine 20 MG tablet  Commonly known as: PEPCID  Take 1 tablet by mouth 2 times daily     glipiZIDE 5 MG extended release tablet  Commonly known as: GLUCOTROL XL     loperamide 2 MG capsule  Commonly known as: RA Anti-Diarrheal  Take 1 capsule by mouth 4 times daily as needed for Diarrhea     lurasidone 40 MG Tabs tablet  Commonly known as: LATUDA  Take 1 tablet by mouth daily     MULTIVITAMIN PO     polyethylene glycol 17 g Pack packet  Commonly known as: MIRALAX     Victoza 18 MG/3ML Sopn SC injection  Generic drug: Liraglutide     zolpidem 10 MG tablet  Commonly known as: AMBIEN           Where to Get Your Medications      You can get these medications from any pharmacy    Bring a paper prescription for each of these medications  · oxyCODONE 5 MG immediate release tablet         Андрей Devlin DO  09/05/21  11:07 AM

## 2021-09-05 NOTE — PROGRESS NOTES
Pt a/o x4, VSS. Lungs remain clear, abdomen soft and non-tender. Surgical site CDI, prevena taken down by surgical team. Heri Zapien drain teaching gone over with pt. All needs met at this time.

## 2021-09-07 NOTE — OP NOTE
Pt c/o that \"i'm withdrawing from heroin\" with nasal rhinitis, chills, generalized body aches starting this AM, last used heroin (denies IV use) yesterday, \"i want some help to stop using it. \" [place] [address]                                OPERATIVE REPORT    PATIENT NAME: Aga Clark                   :        1955  MED REC NO:   [id number]                          ROOM:       [id number]  ACCOUNT NO:   [de-identified]                           ADMIT DATE: 2021  PROVIDER:     Jelani Damon MD    DATE OF PROCEDURE:  2021    PREOPERATIVE DIAGNOSIS:  Incarcerated incisional hernia. POSTOPERATIVE DIAGNOSIS:  Incarcerated incisional hernia. PROCEDURE PERFORMED:  Open repair of incarcerated incisional hernia with  Mesh with component separation of the right and left abdominal wall. SURGEON FOR THE CASE:  Jelani Damno MD    ANESTHESIA:  General endotracheal anesthesia. ESTIMATED BLOOD LOSS:  Less than 50 mL. COMPLICATIONS:  None. SPECIMEN:  Hernia sac. INDICATION FOR PROCEDURE:  The patient is a 77-year-old female with  multiple previous midline abdominal surgeries, who presents with a bulge  mainly below the umbilicus. The patient underwent evaluation which  included CT scan which showed multiple fascial defects with incarcerated  fat within several of these hernia defects. Based on this and the  patient's symptoms, a decision was made to pursue with operative repair. The risks and benefits of the procedure were discussed at length with  the patient. Consent was obtained. DETAILS OF THE OPERATION:  The patient was brought to the operating  room, placed in the supine position. After adequate induction of  general anesthetic, the patient was prepped and draped in a sterile  fashion. Previous midline incision was utilized by making an incision  along these lines from below the xiphoid process all the way down to the  pubis. Electrocautery was then used to deepen the incision to expose  the midline fascia.   In the course of this, we were able to identify  multiple hernia sacs through which she had incarcerated fat within  these. We were able to enter the abdomen in the epigastrium and once we  were able to enter into the abdomen, we were able to carefully extend  the incision all the way down to the pubis and in the course of this, we  excised the hernia sac without significant difficulty. She did have  some mild intra-abdominal adhesions and these were taken down fairly  easily with electrocautery. Once we were able to enter the abdomen, I  had the full exposure of the fascia. We were then proceeded to perform  the separation of the anterior and posterior fascia. Excision was made  just lateral to the linea alba to gain entrance into the posterior  fascia. This was then extended through the course of opened fascia. We  then extended this dissection to fully mobilize the rectus muscle off of  the posterior fascia up to level of semilunar line to provide adequate mobility to bring the fascial together at the midline. We performed this  first on the patient's right and then also on the patient's left in a similar fashion. Once  we were able to do this, hemostasis was then obtained using  electrocautery. We had good hemostasis both intra-abdominally and above  the posterior fascia. We then closed the posterior fascia using two  running 0 Vicryl sutures. This allowed for nice approximation of the  posterior fascia. Her previous ileostomy site also required a suture to  close this fascia on the patient's right. Once we did this, we checked  her hemostasis. Again good hemostasis noted and then we utilized a 20 x  15 cm Bard soft mesh that was cut to this size and this was entered into  this space and laid nicely within this space. We then utilized EVICEL  to approximate the mesh to the posterior fascia and once we had good  apposition of the mesh to this fascia, we then closed the anterior  fascia over the mesh using two running looped 0 PDS.   Once we did this,  a 19-[place]ov[place] Alfonso-Zamora drain was then introduced through a stab  incision in the left lower quadrant and introduced into the subcutaneous  space. We then closed the midline incision using interrupted 3-0 Vicryl  sutures and then skin was then closed using radha and then Carlen [place] was  then utilized over the incision. ROZINA drain site was then secured using a  2-0 Prolene suture, Biopatch, and then Tegaderm. The patient tolerated  the procedure well and was taken to the recovery room in stable  condition.         Sera Osborn MD    D: 09/07/2021 8:35:09       T: 09/07/2021 10:01:18     [email or web address]  Job#: [id number]     Doc#: [id number]    CC:

## 2021-09-10 NOTE — PROGRESS NOTES
Physician Progress Note      PATIENT:               Sarah Currie  CSN #:                  [id number]  :                       1955  ADMIT DATE:       2021 9:28 AM  DISCH DATE:        2021 11:49 AM  RESPONDING  PROVIDER #:        Ronny Dash DO          QUERY TEXT:    Pt admitted with incarcerated incisional hernia. Noted documentation of   History of Paranoid schizophrenia on the active problem list in the H&P. If   possible, please document in progress notes and discharge summary:    The medical record reflects the following:  Risk Factors: 73 yo female w/ HX of Paranoid schizophrenia  Clinical Indicators: Pt was treated with Latuda PO daily on 9/3, ,   Treatment: PO Latuda daily  Options provided:  -- Paranoid schizophrenia is clinically significant and was treated with home   medication Latuda  -- Paranoid schizophrenia is not clinically significant  -- Other - I will add my own diagnosis  -- Disagree - Not applicable / Not valid  -- Disagree - Clinically unable to determine / Unknown  -- Refer to Clinical Documentation Reviewer    PROVIDER RESPONSE TEXT:    Paranoid schizophrenia is not clinically significant.     Query created by: Yazmin Osborne on 2021 2:32 PM      Electronically signed by:  Ronny Dash DO 9/10/2021 7:57 PM

## 2021-09-13 ENCOUNTER — OFFICE VISIT (OUTPATIENT)
Dept: SURGERY | Age: 66
End: 2021-09-13

## 2021-09-13 VITALS
HEART RATE: 80 BPM | TEMPERATURE: 98.1 F | HEIGHT: 55 IN | WEIGHT: 174.4 LBS | DIASTOLIC BLOOD PRESSURE: 78 MMHG | SYSTOLIC BLOOD PRESSURE: 157 MMHG | BODY MASS INDEX: 40.36 KG/M2

## 2021-09-13 DIAGNOSIS — G89.18 POST-OP PAIN: Primary | ICD-10-CM

## 2021-09-13 PROCEDURE — 99024 POSTOP FOLLOW-UP VISIT: CPT | Performed by: SURGERY

## 2021-09-13 RX ORDER — OXYCODONE HYDROCHLORIDE AND ACETAMINOPHEN 5; 325 MG/1; MG/1
1 TABLET ORAL EVERY 6 HOURS PRN
Qty: 20 TABLET | Refills: 0 | Status: SHIPPED | OUTPATIENT
Start: 2021-09-13 | End: 2021-09-18

## 2021-09-16 NOTE — PROGRESS NOTES
Frequency of Social Gatherings with Friends and Family:     Attends Bahai Services:     Active Member of Clubs or Organizations:     Attends Club or Organization Meetings:     Marital Status:    Intimate Partner Violence:     Fear of Current or Ex-Partner:     Emotionally Abused:     Physically Abused:     Sexually Abused:        Family History:   No family history on file. Allergy: No Known Allergies    PHYSICAL EXAM:  VITALS:  BP (!) 157/78   Pulse 80   Temp 98.1 °F (36.7 °C)   Ht 4' (1.219 m)   Wt 174 lb 6.4 oz (79.1 kg)   BMI 53.22 kg/m²     CONSTITUTIONAL:  alert, no apparent distress and morbidly obese  EYES:  sclera clear  ENT:  normocepalic, without obvious abnormality  NECK:  supple, symmetrical, trachea midline and no carotid bruits  LUNGS:  clear to auscultation  CARDIOVASCULAR:  regular rate and rhythm and no murmur noted  ABDOMEN:  Large midline incision healing well, staples removed, normal bowel sounds, soft, non-distended, non-tender, voluntary guarding absent, no masses palpated, ROZINA <25 ml/d and was removed. MUSCULOSKELETAL:  0+ pitting edema lower extremities  NEUROLOGIC:  Mental Status Exam:  Level of Alertness:   awake  Orientation:   person, place, time  SKIN:  no bruising or bleeding and normal skin color, texture, turgor    IMPRESSION/RECOMMENDATIONS:    Patient is s/p large open incisional hernia repair. She is recovering well from surgery. Her pain is still moderate but improving and she is slowly returning to normal activities. We discussed her continued restrictions and I will see her back in the office in 2 weeks.       Livier Nava MD

## 2021-09-20 ENCOUNTER — OFFICE VISIT (OUTPATIENT)
Dept: SURGERY | Age: 66
End: 2021-09-20

## 2021-09-20 VITALS
HEART RATE: 84 BPM | DIASTOLIC BLOOD PRESSURE: 81 MMHG | BODY MASS INDEX: 40.27 KG/M2 | WEIGHT: 174 LBS | HEIGHT: 55 IN | SYSTOLIC BLOOD PRESSURE: 148 MMHG | TEMPERATURE: 98 F

## 2021-09-20 DIAGNOSIS — Z09 POSTOP CHECK: Primary | ICD-10-CM

## 2021-09-20 PROCEDURE — 99024 POSTOP FOLLOW-UP VISIT: CPT | Performed by: SURGERY

## 2021-10-04 ENCOUNTER — OFFICE VISIT (OUTPATIENT)
Dept: SURGERY | Age: 66
End: 2021-10-04

## 2021-10-04 VITALS
BODY MASS INDEX: 40.04 KG/M2 | WEIGHT: 173 LBS | TEMPERATURE: 97.9 F | SYSTOLIC BLOOD PRESSURE: 140 MMHG | HEART RATE: 97 BPM | DIASTOLIC BLOOD PRESSURE: 86 MMHG | HEIGHT: 55 IN

## 2021-10-04 DIAGNOSIS — Z09 POSTOP CHECK: Primary | ICD-10-CM

## 2021-10-04 PROCEDURE — 99024 POSTOP FOLLOW-UP VISIT: CPT | Performed by: SURGERY

## 2021-10-15 NOTE — PROGRESS NOTES
PATIENT NAME: Sarah Ferrer     YOB: 1955     TODAY'S DATE: 10/15/2021    Reason for Visit:  Post-op check     Requesting Physician:  Kevin Orellana    HISTORY OF PRESENT ILLNESS:              The patient is a 74 y.o. female with a PMHx as delineated below who presents without significant complaints except for pain with activities.      Chief Complaint   Patient presents with    Follow-up     open incisional hernia repair 9/1/21       REVIEW OF SYSTEMS:  CONSTITUTIONAL:  negative  HEENT:  negative  RESPIRATORY:  negative  CARDIOVASCULAR:  negative  GASTROINTESTINAL:  negative except for abdominal pain  GENITOURINARY:  negative  HEMATOLOGIC/LYMPHATIC:  negative  MUSCULOSKELETAL: negative  NEUROLOGICAL:  negative    PMH  Past Medical History:   Diagnosis Date    Anxiety, generalized     CPAP (continuous positive airway pressure) dependence     Diabetes mellitus ([address]     no meds    Diverticulitis     H/O ischemic bowel disease 03/2016    Hepatitis C     History of blood transfusion     s/p hysterectomy    History of kidney stones     x 1 episode    Hyperlipidemia     LBBB (left bundle branch block)     Loose stools     secondary to history of colectomy    Paranoid schizophrenia ([address]     Proteinuria     history of    Rectal prolapse     Retention of urine     Sciatica     Sleep apnea     USES CPAP    Sleep apnea     Wears partial dentures        PSH  Past Surgical History:   Procedure Laterality Date    ABDOMEN SURGERY  09/20/2016    OPEN ILEOSTOMY TAKEDOWN                   COLONOSCOPY      COLPOPEXY N/A 4/26/2019    OPEN RECTOPEXY, LYSIS OF ADHESIONS, INTRAOPERATIVE FLEXIBLE SIGMOIDOSCOPY performed by Angella Berry MD at [place] [id number] Bilateral 4/26/2019    CYSTOSCOPY, BILATERAL URETERAL STENT INSERTION performed by Nabil Burton MD at [address] [place] [address]      w/tubaligation    HYSTERECTOMY      PARTIAL VAGINAL    JOINT REPLACEMENT Family:     Frequency of Social Gatherings with Friends and Family:     Attends Buddhist Services:     Active Member of Clubs or Organizations:     Attends Club or Organization Meetings:     Marital Status:    Intimate Partner Violence:     Fear of Current or Ex-Partner:     Emotionally Abused:     Physically Abused:     Sexually Abused:        Family History:   No family history on file. Allergy: No Known Allergies    PHYSICAL EXAM:  VITALS:  BP (!) 148/81   Pulse 84   Temp 98 °F (36.7 °C)   Ht 4' (1.219 m)   Wt 174 lb (78.9 kg)   BMI 53.10 kg/m²     CONSTITUTIONAL:  alert, no apparent distress and morbidly obese  EYES:  sclera clear  ENT:  normocepalic, without obvious abnormality  NECK:  supple, symmetrical, trachea midline and no carotid bruits  LUNGS:  clear to auscultation  CARDIOVASCULAR:  regular rate and rhythm and no murmur noted  ABDOMEN:  Large midline incision healing well, normal bowel sounds, soft, non-distended, non-tender, voluntary guarding absent, no masses palpated  MUSCULOSKELETAL:  0+ pitting edema lower extremities  NEUROLOGIC:  Mental Status Exam:  Level of Alertness:   awake  Orientation:   person, place, time  SKIN:  no bruising or bleeding and normal skin color, texture, turgor    IMPRESSION/RECOMMENDATIONS:    Patient is s/p large open incisional hernia repair. She is recovering well from surgery. Her pain is still moderate but improving and she is slowly returning to normal activities. We discussed her continued restrictions and I will see her back in the office on a PRN basis.       Arvin Adams MD

## 2021-10-16 NOTE — PROGRESS NOTES
PATIENT NAME: Walt Ureña     YOB: 1955     TODAY'S DATE: 10/16/2021    Reason for Visit:  Post-op check     Requesting Physician:  Luisa Gandara    HISTORY OF PRESENT ILLNESS:              The patient is a 74 y.o. female with a PMHx as delineated below who presents without significant complaints except for pain with activities.      Chief Complaint   Patient presents with    Follow-up     2 wk f/u open incisional hernia 9/1/21       REVIEW OF SYSTEMS:  CONSTITUTIONAL:  negative  HEENT:  negative  RESPIRATORY:  negative  CARDIOVASCULAR:  negative  GASTROINTESTINAL:  negative except for abdominal pain  GENITOURINARY:  negative  HEMATOLOGIC/LYMPHATIC:  negative  MUSCULOSKELETAL: negative  NEUROLOGICAL:  negative    PMH  Past Medical History:   Diagnosis Date    Anxiety, generalized     CPAP (continuous positive airway pressure) dependence     Diabetes mellitus ([address]     no meds    Diverticulitis     H/O ischemic bowel disease 03/2016    Hepatitis C     History of blood transfusion     s/p hysterectomy    History of kidney stones     x 1 episode    Hyperlipidemia     LBBB (left bundle branch block)     Loose stools     secondary to history of colectomy    Paranoid schizophrenia ([address]     Proteinuria     history of    Rectal prolapse     Retention of urine     Sciatica     Sleep apnea     USES CPAP    Sleep apnea     Wears partial dentures        PSH  Past Surgical History:   Procedure Laterality Date    ABDOMEN SURGERY  09/20/2016    OPEN ILEOSTOMY TAKEDOWN                   COLONOSCOPY      COLPOPEXY N/A 4/26/2019    OPEN RECTOPEXY, LYSIS OF ADHESIONS, INTRAOPERATIVE FLEXIBLE SIGMOIDOSCOPY performed by Jeremy Marquez MD at [place] [address] Bilateral 4/26/2019    CYSTOSCOPY, BILATERAL URETERAL STENT INSERTION performed by Silvia Gaspar MD at [address] [place] [address]      w/tubaligation    HYSTERECTOMY      PARTIAL VAGINAL    JOINT REPLACEMENT Bilateral     knees    KIDNEY STONE SURGERY      KIDNEY STONE SURGERY      KNEE SURGERY      bilateral knee replacements    OTHER SURGICAL HISTORY  3/5/16    DIAGNOSTIC LAPAROSCOPY, SUBTOTAL COLECTOMY, COLOSTOMY,    ID COLONOSCOPY FLX DX W/COLLJ SPEC WHEN PFRMD N/A 10/25/2018    COLONOSCOPY, POSSIBLE POLYPECTOMY WITH MAC performed by William Crum MD at [address] [place] [address] N/A 3/23/2021    EXAM UNDER ANESTHESIA, DELORME PROCEDURE performed by William Crum MD at [facility] N/A 2021    OPEN INCISIONAL HERNIA REPAIR performed by Belgica Camarillo MD at [address] EXTRACTION         Social History  Social History     Socioeconomic History    Marital status:      Spouse name: Not on file    Number of children: Not on file    Years of education: Not on file    Highest education level: Not on file   Occupational History    Not on file   Tobacco Use    Smoking status: Current Every Day Smoker     Packs/day: 0.50     Years: 20.00     Pack years: 10.00     Types: Cigarettes     Start date: 3/22/2001     Last attempt to quit: 2016     Years since quittin.6    Smokeless tobacco: Never Used   Vaping Use    Vaping Use: Never used   Substance and Sexual Activity    Alcohol use: No    Drug use: No    Sexual activity: Not Currently   Other Topics Concern    Not on file   Social History Narrative    Not on file     Social Determinants of Health     Financial Resource Strain:     Difficulty of Paying Living Expenses:    Food Insecurity:     Worried About Running Out of Food in the Last Year:     [address] in the Last Year:    Transportation Needs:     Lack of Transportation (Medical):      Lack of Transportation (Non-Medical):    Physical Activity:     Days of Exercise per Week:     Minutes of Exercise per Session:    Stress:     Feeling of Stress :    Social Connections:     Frequency of Communication with Friends and Family:     Frequency of Social Gatherings with Friends and Family:     Attends [employer] Services:     Active Member of Clubs or Organizations:     Attends Club or Organization Meetings:     Marital Status:    Intimate Partner Violence:     Fear of Current or Ex-Partner:     Emotionally Abused:     Physically Abused:     Sexually Abused:        Family History:   No family history on file. Allergy: No Known Allergies    PHYSICAL EXAM:  VITALS:  BP (!) 140/86   Pulse 97   Temp 97.9 °F (36.6 °C)   Ht 4' (1.219 m)   Wt 173 lb (78.5 kg)   BMI 52.79 kg/m²     CONSTITUTIONAL:  alert, no apparent distress and morbidly obese  EYES:  sclera clear  ENT:  normocepalic, without obvious abnormality  NECK:  supple, symmetrical, trachea midline and no carotid bruits  LUNGS:  clear to auscultation  CARDIOVASCULAR:  regular rate and rhythm and no murmur noted  ABDOMEN:  Large midline incision healing well, normal bowel sounds, soft, non-distended, non-tender, voluntary guarding absent, no masses palpated  MUSCULOSKELETAL:  0+ pitting edema lower extremities  NEUROLOGIC:  Mental Status Exam:  Level of Alertness:   awake  Orientation:   person, place, time  SKIN:  no bruising or bleeding and normal skin color, texture, turgor    IMPRESSION/RECOMMENDATIONS:    Patient is s/p large open incisional hernia repair. She is recovering well from surgery. Her pain is still moderate but improving and she is slowly returning to normal activities. We discussed her continued restrictions and I will see her back in the office on a PRN basis.       Jennifer Car MD

## 2021-12-20 ENCOUNTER — OFFICE VISIT (OUTPATIENT)
Dept: CARDIOLOGY CLINIC | Age: 66
End: 2021-12-20
Payer: COMMERCIAL

## 2021-12-20 ENCOUNTER — TELEPHONE (OUTPATIENT)
Dept: CARDIOLOGY CLINIC | Age: 66
End: 2021-12-20

## 2021-12-20 VITALS
HEIGHT: 55 IN | DIASTOLIC BLOOD PRESSURE: 80 MMHG | OXYGEN SATURATION: 99 % | BODY MASS INDEX: 39.57 KG/M2 | WEIGHT: 171 LBS | SYSTOLIC BLOOD PRESSURE: 142 MMHG | HEART RATE: 79 BPM

## 2021-12-20 DIAGNOSIS — I10 ESSENTIAL HYPERTENSION: Primary | ICD-10-CM

## 2021-12-20 DIAGNOSIS — E78.2 MIXED HYPERLIPIDEMIA: ICD-10-CM

## 2021-12-20 DIAGNOSIS — E11.9 TYPE 2 DIABETES MELLITUS WITHOUT COMPLICATION, WITHOUT LONG-TERM CURRENT USE OF INSULIN (HCC): ICD-10-CM

## 2021-12-20 DIAGNOSIS — I44.7 LBBB (LEFT BUNDLE BRANCH BLOCK): ICD-10-CM

## 2021-12-20 PROCEDURE — 4004F PT TOBACCO SCREEN RCVD TLK: CPT | Performed by: INTERNAL MEDICINE

## 2021-12-20 PROCEDURE — G8484 FLU IMMUNIZE NO ADMIN: HCPCS | Performed by: INTERNAL MEDICINE

## 2021-12-20 PROCEDURE — 1090F PRES/ABSN URINE INCON ASSESS: CPT | Performed by: INTERNAL MEDICINE

## 2021-12-20 PROCEDURE — 99214 OFFICE O/P EST MOD 30 MIN: CPT | Performed by: INTERNAL MEDICINE

## 2021-12-20 PROCEDURE — G8399 PT W/DXA RESULTS DOCUMENT: HCPCS | Performed by: INTERNAL MEDICINE

## 2021-12-20 PROCEDURE — 4040F PNEUMOC VAC/ADMIN/RCVD: CPT | Performed by: INTERNAL MEDICINE

## 2021-12-20 PROCEDURE — 1123F ACP DISCUSS/DSCN MKR DOCD: CPT | Performed by: INTERNAL MEDICINE

## 2021-12-20 PROCEDURE — 2022F DILAT RTA XM EVC RTNOPTHY: CPT | Performed by: INTERNAL MEDICINE

## 2021-12-20 PROCEDURE — 3017F COLORECTAL CA SCREEN DOC REV: CPT | Performed by: INTERNAL MEDICINE

## 2021-12-20 PROCEDURE — G8428 CUR MEDS NOT DOCUMENT: HCPCS | Performed by: INTERNAL MEDICINE

## 2021-12-20 PROCEDURE — G8417 CALC BMI ABV UP PARAM F/U: HCPCS | Performed by: INTERNAL MEDICINE

## 2021-12-20 PROCEDURE — 3046F HEMOGLOBIN A1C LEVEL >9.0%: CPT | Performed by: INTERNAL MEDICINE

## 2021-12-20 RX ORDER — LOSARTAN POTASSIUM 50 MG/1
50 TABLET ORAL DAILY
Qty: 90 TABLET | Refills: 3 | Status: SHIPPED | OUTPATIENT
Start: 2021-12-20 | End: 2022-01-11

## 2021-12-20 NOTE — PROGRESS NOTES
74 y.o. here for f/u. Feels good. Glad hernia surgery happened was ok. No cp. No sob. No n/v/LH/dizziness. No synocpe. Still smoking. Down to 1/2 ppd, 1/4 ppd.       Past Medical History:   Diagnosis Date    Anxiety, generalized     CPAP (continuous positive airway pressure) dependence     Diabetes mellitus (HCC)     no meds    Diverticulitis     H/O ischemic bowel disease 03/2016    Hepatitis C     History of blood transfusion     s/p hysterectomy    History of kidney stones     x 1 episode    Hyperlipidemia     LBBB (left bundle branch block)     Loose stools     secondary to history of colectomy    Paranoid schizophrenia [facility]     Proteinuria     history of    Rectal prolapse     Retention of urine     Sciatica     Sleep apnea     USES CPAP    Sleep apnea     Wears partial dentures      Past Surgical History:   Procedure Laterality Date    ABDOMEN SURGERY  09/20/2016    OPEN ILEOSTOMY TAKEDOWN                   COLONOSCOPY      COLPOPEXY N/A 4/26/2019    OPEN RECTOPEXY, LYSIS OF ADHESIONS, INTRAOPERATIVE FLEXIBLE SIGMOIDOSCOPY performed by Marina Javier MD at [place] [address] Bilateral 4/26/2019    CYSTOSCOPY, BILATERAL URETERAL STENT INSERTION performed by Bo Obrien MD at [address]      w/tubaligation    HYSTERECTOMY      PARTIAL VAGINAL    JOINT REPLACEMENT Bilateral     knees    KIDNEY STONE SURGERY      KIDNEY STONE SURGERY      KNEE SURGERY      bilateral knee replacements    OTHER SURGICAL HISTORY  3/5/16    DIAGNOSTIC LAPAROSCOPY, SUBTOTAL COLECTOMY, COLOSTOMY,    MO COLONOSCOPY FLX DX W/COLLJ SPEC WHEN PFRMD N/A 10/25/2018    COLONOSCOPY, POSSIBLE POLYPECTOMY WITH MAC performed by Amanda Kelly MD at [address] [place] [address] N/A 3/23/2021    EXAM UNDER ANESTHESIA, DELORME PROCEDURE performed by Amanda Kelly MD at [place] [address] N/A 9/1/2021    OPEN [address] performed by Ben Cheung MD at [address] EXTRACTION       Social History     Tobacco Use    Smoking status: Current Every Day Smoker     Packs/day: 0.50     Years: 20.00     Pack years: 10.00     Types: Cigarettes     Start date: 3/22/2001     Last attempt to quit: 2016     Years since quittin.8    Smokeless tobacco: Never Used   Vaping Use    Vaping Use: Never used   Substance Use Topics    Alcohol use: No    Drug use: No     No Known Allergies    No family history on file. Review of Systems   General: + night sweats. HEENT: No blurry or decreased vision. No changes in hearing, nasal discharge or sore throat. Cardiovascular: See HPI. No cramping in legs or buttocks when walking. Respiratory: No cough, hemoptysis, or wheezing. No history of asthma. Gastrointestinal: Rectal prolapse. Genito-Urinary: No dysuria or hematuria. No urgency or polyuria. Musculoskeletal: No complaints of joint pain, joint swelling or muscular weakness/soreness. Neurological: No dizziness or headaches. No numbness/tingling, speech problems or weakness. No history of a stroke or TIA. Psychological: Schizophrenia in   Hematological and Lymphatic: No abnormal bleeding or bruising, blood clots, jaundice. Endocrine: No malaise/lethargy, palpitations, polydipsia/polyuria, temperature intolerance or unexpected weight changes. Skin: No rashes or non-healing ulcers. PE:  Blood pressure (!) 142/80, pulse 79, height 4' 7\" (1.397 m), weight 171 lb (77.6 kg), SpO2 99 %, not currently breastfeeding. General (appearance): Well devel. No distress. Eyes: Anicteric. EOMI. Neck: supple. No jvd  Ears/Nose/Mouth/Thorat: No cyanosis  CV: RRR. No m/r/g   Respiratory:  Clear B, normal respiratory effort  GI: abd s/nt/nd  Skin: Warm, dry. No rashes  Neuro/Psych: Alert and oriented x 3. Appropriate behavior  Ext:  No c/c.  No edema  Pulses:  2+ radial      Lab Results   Component Value Date    WBC 9.1 2021 HGB 11.2 (L) 09/05/2021    HCT 31.6 (L) 09/05/2021    MCV 95.6 09/05/2021     09/05/2021       Chemistry        Component Value Date/Time     09/05/2021 0443    K 3.7 09/05/2021 0443    CL 97 (L) 09/05/2021 0443    CO2 23 09/05/2021 0443    BUN 21 (H) 09/05/2021 0443    CREATININE 0.8 09/05/2021 0443        Component Value Date/Time    CALCIUM 9.4 09/05/2021 0443    ALKPHOS 61 03/08/2021 0837    AST 21 03/08/2021 0837    ALT 19 03/08/2021 0837    BILITOT <0.2 03/08/2021 0837        Lab Results   Component Value Date    INR 1.11 03/23/2021    INR 1.14 04/26/2019    INR 1.18 (H) 10/24/2016    PROTIME 12.9 03/23/2021    PROTIME 13.0 04/26/2019    PROTIME 13.5 (H) 10/24/2016     Lab Results   Component Value Date    CHOL 162 03/08/2021    CHOL 118 07/13/2020    CHOL 170 07/03/2019     Lab Results   Component Value Date    TRIG 131 03/08/2021    TRIG 116 07/13/2020    TRIG 135 07/03/2019     Lab Results   Component Value Date    HDL 74 (H) 03/08/2021    HDL 53 07/13/2020    HDL 77 (H) 07/03/2019     Lab Results   Component Value Date    LDLCALC 62 03/08/2021    LDLCALC 42 07/13/2020    LDLCALC 66 07/03/2019     Lab Results   Component Value Date    LABVLDL 26 03/08/2021    LABVLDL 23 07/13/2020    LABVLDL 27 07/03/2019     No results found for: CHOLHDLRATIO    ECG NSR with LBBB    5/2017 TTE: EF 50-55%.  Tr TR with RVSP 32.    9/19/2016 Nuc stress: Normal    9/15/16 ECG: NSR with frequent PAC/ectopic atrial pacemaker, LAD, LBBB    3/2021 Nuc Stress: No ischemia      Current Outpatient Medications:     VICTOZA 18 MG/3ML SOPN SC injection, INJECT 0.6MG MG SUBCUTANEOUSLY ONCE DAILY, Disp: , Rfl:     glipiZIDE (GLUCOTROL XL) 5 MG extended release tablet, Take 5 mg by mouth daily (with breakfast), Disp: , Rfl:     amLODIPine (NORVASC) 10 MG tablet, Take 1 tablet by mouth daily, Disp: 90 tablet, Rfl: 3    zolpidem (AMBIEN) 10 MG tablet, Take 10 mg by mouth nightly. , Disp: , Rfl:     acetaminophen (TYLENOL) 325 MG tablet, Take 325 mg by mouth as needed, Disp: , Rfl:     DULoxetine (CYMBALTA) 20 MG extended release capsule, Take 20 mg by mouth daily, Disp: , Rfl:     loperamide (RA ANTI-DIARRHEAL) 2 MG capsule, Take 1 capsule by mouth 4 times daily as needed for Diarrhea, Disp: 90 capsule, Rfl: 5    LORazepam (ATIVAN) 0.5 MG tablet, Take 1 tablet by mouth every 12 hours as needed (Patient taking differently: Take 0.5 mg by mouth 2 times daily. ), Disp: 60 tablet, Rfl: 0    lurasidone (LATUDA) 40 MG TABS tablet, Take 1 tablet by mouth daily, Disp: 30 tablet, Rfl: 0    famotidine (PEPCID) 20 MG tablet, Take 1 tablet by mouth 2 times daily, Disp: 60 tablet, Rfl: 3    atorvastatin (LIPITOR) 40 MG tablet, Take 40 mg by mouth daily, Disp: , Rfl:     albuterol sulfate  (90 BASE) MCG/ACT inhaler, Inhale 2 puffs into the lungs every 6 hours as needed for Wheezing, Disp: , Rfl:     diphenhydrAMINE (BENADRYL) 25 MG capsule, Take 25-50 mg by mouth nightly as needed for Sleep , Disp: , Rfl:     polyethylene glycol (MIRALAX) 17 g PACK packet, Take 17 g by mouth daily, Disp: , Rfl:     Multiple Vitamins-Minerals (MULTIVITAMIN PO), Take by mouth, Disp: , Rfl:     A/P:  74 y.o. woman, cont to smoke, has DM, HTN, and hyperlipidemia. 1. Essential hypertension    2. LBBB (left bundle branch block)    3. Mixed hyperlipidemia    4. Type 2 diabetes mellitus without complication, without long-term current use of insulin (HCC)      Recs:  -Cont DM Meds  -Amlodipine, statin  -Start losartan 50  -BMP in 1-2 weeks  -F/U May/June    Stepan Flores MD, [facility], [facility]

## 2021-12-20 NOTE — TELEPHONE ENCOUNTER
Pt called to ask if she can take amlodpine and losartan at same time and should these be taken a certain time of the day.  Either AM or PM. Please advis e

## 2021-12-20 NOTE — TELEPHONE ENCOUNTER
Yes she can take amlodipine and losartan together or separately. Take once a day, morning or evening just be consistent.

## 2022-01-04 DIAGNOSIS — I10 ESSENTIAL HYPERTENSION: ICD-10-CM

## 2022-01-04 DIAGNOSIS — E78.2 MIXED HYPERLIPIDEMIA: ICD-10-CM

## 2022-01-04 LAB
ANION GAP SERPL CALCULATED.3IONS-SCNC: 14 MMOL/L (ref 3–16)
BUN BLDV-MCNC: 15 MG/DL (ref 7–20)
CALCIUM SERPL-MCNC: 10.4 MG/DL (ref 8.3–10.6)
CHLORIDE BLD-SCNC: 103 MMOL/L (ref 99–110)
CO2: 24 MMOL/L (ref 21–32)
CREAT SERPL-MCNC: 0.8 MG/DL (ref 0.6–1.2)
GFR AFRICAN AMERICAN: >60
GFR NON-AFRICAN AMERICAN: >60
GLUCOSE BLD-MCNC: 152 MG/DL (ref 70–99)
POTASSIUM SERPL-SCNC: 4.5 MMOL/L (ref 3.5–5.1)
SODIUM BLD-SCNC: 141 MMOL/L (ref 136–145)

## 2022-01-10 ENCOUNTER — TELEPHONE (OUTPATIENT)
Dept: CARDIOLOGY CLINIC | Age: 67
End: 2022-01-10

## 2022-01-10 NOTE — TELEPHONE ENCOUNTER
Pt called to find out lab results from 1/4/22. She also called to discuss BP. She thinks it has been higher since starting losartan. Today her BP is 174/92.

## 2022-01-10 NOTE — TELEPHONE ENCOUNTER
Lab Results   Component Value Date     01/04/2022    K 4.5 01/04/2022     01/04/2022    CO2 24 01/04/2022    BUN 15 01/04/2022    CREATININE 0.8 01/04/2022    GLUCOSE 152 01/04/2022    CALCIUM 10.4 01/04/2022      Labs stable  Recommend increased losartan to 100 mg po daily   Check BMP again in 1 week    Continue to monitor BP  Come into office for follow up visit. Bring in BP readings and home cuff so we can check it.

## 2022-01-11 DIAGNOSIS — I10 ESSENTIAL HYPERTENSION: Primary | ICD-10-CM

## 2022-01-11 RX ORDER — LOSARTAN POTASSIUM 50 MG/1
100 TABLET ORAL DAILY
Qty: 90 TABLET | Refills: 1 | Status: SHIPPED | OUTPATIENT
Start: 2022-01-11 | End: 2022-01-21 | Stop reason: SDUPTHER

## 2022-01-17 DIAGNOSIS — I10 ESSENTIAL HYPERTENSION: ICD-10-CM

## 2022-01-17 LAB
ANION GAP SERPL CALCULATED.3IONS-SCNC: 14 MMOL/L (ref 3–16)
BUN BLDV-MCNC: 10 MG/DL (ref 7–20)
CALCIUM SERPL-MCNC: 9.9 MG/DL (ref 8.3–10.6)
CHLORIDE BLD-SCNC: 104 MMOL/L (ref 99–110)
CO2: 26 MMOL/L (ref 21–32)
CREAT SERPL-MCNC: 0.8 MG/DL (ref 0.6–1.2)
GFR AFRICAN AMERICAN: >60
GFR NON-AFRICAN AMERICAN: >60
GLUCOSE BLD-MCNC: 152 MG/DL (ref 70–99)
POTASSIUM SERPL-SCNC: 4.5 MMOL/L (ref 3.5–5.1)
SODIUM BLD-SCNC: 144 MMOL/L (ref 136–145)

## 2022-01-21 ENCOUNTER — OFFICE VISIT (OUTPATIENT)
Dept: CARDIOLOGY CLINIC | Age: 67
End: 2022-01-21
Payer: COMMERCIAL

## 2022-01-21 VITALS
BODY MASS INDEX: 34.27 KG/M2 | OXYGEN SATURATION: 98 % | DIASTOLIC BLOOD PRESSURE: 72 MMHG | HEIGHT: 59 IN | SYSTOLIC BLOOD PRESSURE: 138 MMHG | WEIGHT: 170 LBS | HEART RATE: 80 BPM

## 2022-01-21 DIAGNOSIS — I10 ESSENTIAL HYPERTENSION: Primary | ICD-10-CM

## 2022-01-21 DIAGNOSIS — I44.7 LBBB (LEFT BUNDLE BRANCH BLOCK): ICD-10-CM

## 2022-01-21 DIAGNOSIS — E78.2 MIXED HYPERLIPIDEMIA: ICD-10-CM

## 2022-01-21 PROCEDURE — G8417 CALC BMI ABV UP PARAM F/U: HCPCS | Performed by: NURSE PRACTITIONER

## 2022-01-21 PROCEDURE — 4040F PNEUMOC VAC/ADMIN/RCVD: CPT | Performed by: NURSE PRACTITIONER

## 2022-01-21 PROCEDURE — 99214 OFFICE O/P EST MOD 30 MIN: CPT | Performed by: NURSE PRACTITIONER

## 2022-01-21 PROCEDURE — 1090F PRES/ABSN URINE INCON ASSESS: CPT | Performed by: NURSE PRACTITIONER

## 2022-01-21 PROCEDURE — G8427 DOCREV CUR MEDS BY ELIG CLIN: HCPCS | Performed by: NURSE PRACTITIONER

## 2022-01-21 PROCEDURE — 3017F COLORECTAL CA SCREEN DOC REV: CPT | Performed by: NURSE PRACTITIONER

## 2022-01-21 PROCEDURE — G8399 PT W/DXA RESULTS DOCUMENT: HCPCS | Performed by: NURSE PRACTITIONER

## 2022-01-21 PROCEDURE — G8484 FLU IMMUNIZE NO ADMIN: HCPCS | Performed by: NURSE PRACTITIONER

## 2022-01-21 PROCEDURE — 1123F ACP DISCUSS/DSCN MKR DOCD: CPT | Performed by: NURSE PRACTITIONER

## 2022-01-21 PROCEDURE — 4004F PT TOBACCO SCREEN RCVD TLK: CPT | Performed by: NURSE PRACTITIONER

## 2022-01-21 RX ORDER — LOSARTAN POTASSIUM 100 MG/1
100 TABLET ORAL DAILY
Qty: 90 TABLET | Refills: 3 | Status: SHIPPED | OUTPATIENT
Start: 2022-01-21

## 2022-01-21 RX ORDER — HYDRALAZINE HYDROCHLORIDE 25 MG/1
25 TABLET, FILM COATED ORAL 2 TIMES DAILY
Qty: 180 TABLET | Refills: 3 | Status: SHIPPED | OUTPATIENT
Start: 2022-01-21 | End: 2022-02-08 | Stop reason: SDUPTHER

## 2022-01-21 NOTE — PROGRESS NOTES
CC HTN    HPI:  74 y.o. patient of Dr Sean Beckman with HTN, HLD, and LBBB who is here for BP management. BP remain elevated. No chest pain, sob, LH/dizziness, palpitations or syncope. No LE edema, orthopnea or PND. No fever, chills, n/v/d or GI/ bleeding.        Past Medical History:   Diagnosis Date    Anxiety, generalized     CPAP (continuous positive airway pressure) dependence     Diabetes mellitus (HCC)     no meds    Diverticulitis     H/O ischemic bowel disease 03/2016    Hepatitis C     History of blood transfusion     s/p hysterectomy    History of kidney stones     x 1 episode    Hyperlipidemia     LBBB (left bundle branch block)     Loose stools     secondary to history of colectomy    Paranoid schizophrenia ([facility]     Proteinuria     history of    Rectal prolapse     Retention of urine     Sciatica     Sleep apnea     USES CPAP    Sleep apnea     Wears partial dentures      Past Surgical History:   Procedure Laterality Date    ABDOMEN SURGERY  09/20/2016    OPEN ILEOSTOMY TAKEDOWN                   COLONOSCOPY      COLPOPEXY N/A 4/26/2019    OPEN RECTOPEXY, LYSIS OF ADHESIONS, INTRAOPERATIVE FLEXIBLE SIGMOIDOSCOPY performed by Elvin Goodman MD at [facility] Bilateral 4/26/2019    CYSTOSCOPY, BILATERAL URETERAL STENT INSERTION performed by Dave Posey MD at [address]      w/tubaligation    HYSTERECTOMY      PARTIAL VAGINAL    JOINT REPLACEMENT Bilateral     knees    KIDNEY STONE SURGERY      KIDNEY STONE SURGERY      KNEE SURGERY      bilateral knee replacements    OTHER SURGICAL HISTORY  3/5/16    DIAGNOSTIC LAPAROSCOPY, SUBTOTAL COLECTOMY, COLOSTOMY,    OR COLONOSCOPY FLX DX W/COLLJ SPEC WHEN PFRMD N/A 10/25/2018    COLONOSCOPY, POSSIBLE POLYPECTOMY WITH MAC performed by Armando Sahni MD at [address] [place] [address] N/A 3/23/2021    EXAM UNDER ANESTHESIA, DELORME PROCEDURE performed by Armando Sahni MD at [address]  VENTRAL HERNIA REPAIR N/A 2021    OPEN INCISIONAL HERNIA REPAIR performed by Olvin Gavin MD at [address] [place] [address]       No family history on file. Social History     Tobacco Use    Smoking status: Current Every Day Smoker     Packs/day: 0.50     Years: 20.00     Pack years: 10.00     Types: Cigarettes     Start date: 3/22/2001     Last attempt to quit: 2016     Years since quittin.9    Smokeless tobacco: Never Used   Vaping Use    Vaping Use: Never used   Substance Use Topics    Alcohol use: No    Drug use: No     Allergies:Patient has no known allergies. Review of Systems  General: No changes in weight, fatigue, or night sweats. HEENT: No blurry or decreased vision. No changes in hearing, nasal discharge or sore throat. Cardiovascular:  See HPI. Respiratory: No cough, hemoptysis, or wheezing. Gastrointestinal:  No abdominal pain, hematochezia, melana, constipation, diarrhea, or history of GI ulcers. Genito-Urinary: No dysuria or hematuria. No urgency or polyuria. Musculoskeletal:  No complaints of joint pain, joint swelling or muscular weakness/soreness. Neurological:  No dizziness, numbness/tingling, speech problems or weakness. Psychological:  No anxiety or depression. Hematological and Lymphatic: No abnormal bleeding or bruising, blood clots, jaundice or swollen lymph nodes. Endocrine:   No malaise/lethargy, palpitations, polydipsia/polyuria, temperature intolerance or unexpected weight changes  Skin:  No rashes or non-healing ulcers. Physical Exam:  /72 (Site: Left Upper Arm, Position: Sitting)   Pulse 80   Ht 4' 11\" (1.499 m)   Wt 170 lb (77.1 kg)   SpO2 98%   BMI 34.34 kg/m²    General (appearance):  No acute distress  Eyes: anicteric   Neck: soft, No JVD  Ears/Nose/Mouth/Thorat: No cyanosis  CV: RRR   Respiratory:  Clear, normal effort  GI: soft, non-tender, non-distended  Skin: Warm, dry.  No rashes  Neuro/Psych: Alert and oriented x 3. Appropriate behavior  Ext:  No c/c. No edema  Pulses:  2+ radial and carotid     Weight  Wt Readings from Last 3 Encounters:   12/20/21 171 lb (77.6 kg)   10/04/21 173 lb (78.5 kg)   09/20/21 174 lb (78.9 kg)          CBC:   Lab Results   Component Value Date    WBC 9.1 09/05/2021    HGB 11.2 (L) 09/05/2021    HCT 31.6 (L) 09/05/2021    MCV 95.6 09/05/2021     09/05/2021     BMP:  Lab Results   Component Value Date    CREATININE 0.8 01/17/2022    BUN 10 01/17/2022     01/17/2022    K 4.5 01/17/2022     01/17/2022    CO2 26 01/17/2022     Mag:   Lab Results   Component Value Date    MG 2.10 09/05/2021     LIVER PROFILE:   Lab Results   Component Value Date    ALT 19 03/08/2021    AST 21 03/08/2021    ALKPHOS 61 03/08/2021    BILITOT <0.2 03/08/2021     PT/INR:   Lab Results   Component Value Date    INR 1.11 03/23/2021    INR 1.14 04/26/2019    INR 1.18 (H) 10/24/2016    PROTIME 12.9 03/23/2021    PROTIME 13.0 04/26/2019    PROTIME 13.5 (H) 10/24/2016     Pro-BNP No results found for: PROBNP  LIPIDS:  No components found for: CHLPL  Lab Results   Component Value Date    TRIG 131 03/08/2021    TRIG 116 07/13/2020    TRIG 135 07/03/2019     Lab Results   Component Value Date    HDL 74 (H) 03/08/2021    HDL 53 07/13/2020    HDL 77 (H) 07/03/2019     Lab Results   Component Value Date    LDLCALC 62 03/08/2021    LDLCALC 42 07/13/2020    LDLCALC 66 07/03/2019     Lab Results   Component Value Date    LABVLDL 26 03/08/2021    LABVLDL 23 07/13/2020    LABVLDL 27 07/03/2019     TSH:No results found for: TSH, P1CBDXO, G8AUZLY, THYROIDAB    IMAGING:     3/2021 ECG: Sinus, LAD, LBBB    3/15/2021 Nuc stress:      There is normal isotope uptake at stress and rest. There is no evidence of    myocardial ischemia or scar.    Normal LV size and systolic function.    Left ventricular ejection fraction of 65 %.    There are no regional wall motion abnormalities.    Overall findings represent a low risk scan. 2017 Echo:   Normal left ventricular size and wall thickness. Low normal left ventricular   systolic function with an estimated ejection fraction of 50-55%. There is   abnormal septal motion, possibly due to abnormal interventricular   conduction. Trace tricuspid regurgitation. RVSP 32 mmHg. No significant valvular regurgitation or stenosis. Medications:   Current Outpatient Medications   Medication Sig Dispense Refill    losartan (COZAAR) 50 MG tablet Take 2 tablets by mouth daily 90 tablet 1    VICTOZA 18 MG/3ML SOPN SC injection INJECT 0.6MG MG SUBCUTANEOUSLY ONCE DAILY      glipiZIDE (GLUCOTROL XL) 5 MG extended release tablet Take 5 mg by mouth daily (with breakfast)      amLODIPine (NORVASC) 10 MG tablet Take 1 tablet by mouth daily 90 tablet 3    polyethylene glycol (MIRALAX) 17 g PACK packet Take 17 g by mouth daily      zolpidem (AMBIEN) 10 MG tablet Take 10 mg by mouth nightly.        acetaminophen (TYLENOL) 325 MG tablet Take 325 mg by mouth as needed      DULoxetine (CYMBALTA) 20 MG extended release capsule Take 20 mg by mouth daily      loperamide (RA ANTI-DIARRHEAL) 2 MG capsule Take 1 capsule by mouth 4 times daily as needed for Diarrhea 90 capsule 5    Multiple Vitamins-Minerals (MULTIVITAMIN PO) Take by mouth      LORazepam (ATIVAN) 0.5 MG tablet Take 1 tablet by mouth every 12 hours as needed (Patient taking differently: Take 0.5 mg by mouth 2 times daily. ) 60 tablet 0    lurasidone (LATUDA) 40 MG TABS tablet Take 1 tablet by mouth daily 30 tablet 0    famotidine (PEPCID) 20 MG tablet Take 1 tablet by mouth 2 times daily 60 tablet 3    atorvastatin (LIPITOR) 40 MG tablet Take 40 mg by mouth daily      albuterol sulfate  (90 BASE) MCG/ACT inhaler Inhale 2 puffs into the lungs every 6 hours as needed for Wheezing      diphenhydrAMINE (BENADRYL) 25 MG capsule Take 25-50 mg by mouth nightly as needed for Sleep        No current facility-administered medications for

## 2022-01-27 ENCOUNTER — HOSPITAL ENCOUNTER (OUTPATIENT)
Dept: MAMMOGRAPHY | Age: 67
Discharge: HOME | End: 2022-02-01
Payer: COMMERCIAL

## 2022-01-27 VITALS — BODY MASS INDEX: 32.25 KG/M2 | HEIGHT: 59 IN | WEIGHT: 160 LBS

## 2022-01-27 DIAGNOSIS — Z12.31 VISIT FOR SCREENING MAMMOGRAM: ICD-10-CM

## 2022-01-27 PROCEDURE — 77067 SCR MAMMO BI INCL CAD: CPT

## 2022-02-08 ENCOUNTER — VIRTUAL VISIT (OUTPATIENT)
Dept: CARDIOLOGY CLINIC | Age: 67
End: 2022-02-08
Payer: COMMERCIAL

## 2022-02-08 ENCOUNTER — TELEPHONE (OUTPATIENT)
Dept: CARDIOLOGY CLINIC | Age: 67
End: 2022-02-08

## 2022-02-08 DIAGNOSIS — I10 ESSENTIAL HYPERTENSION: Primary | ICD-10-CM

## 2022-02-08 DIAGNOSIS — E78.2 MIXED HYPERLIPIDEMIA: ICD-10-CM

## 2022-02-08 DIAGNOSIS — I44.7 LBBB (LEFT BUNDLE BRANCH BLOCK): ICD-10-CM

## 2022-02-08 PROCEDURE — G8484 FLU IMMUNIZE NO ADMIN: HCPCS | Performed by: NURSE PRACTITIONER

## 2022-02-08 PROCEDURE — G8417 CALC BMI ABV UP PARAM F/U: HCPCS | Performed by: NURSE PRACTITIONER

## 2022-02-08 PROCEDURE — 1123F ACP DISCUSS/DSCN MKR DOCD: CPT | Performed by: NURSE PRACTITIONER

## 2022-02-08 PROCEDURE — G8399 PT W/DXA RESULTS DOCUMENT: HCPCS | Performed by: NURSE PRACTITIONER

## 2022-02-08 PROCEDURE — 99441 PR PHYS/QHP TELEPHONE EVALUATION 5-10 MIN: CPT | Performed by: NURSE PRACTITIONER

## 2022-02-08 PROCEDURE — 3017F COLORECTAL CA SCREEN DOC REV: CPT | Performed by: NURSE PRACTITIONER

## 2022-02-08 PROCEDURE — G8427 DOCREV CUR MEDS BY ELIG CLIN: HCPCS | Performed by: NURSE PRACTITIONER

## 2022-02-08 PROCEDURE — 1090F PRES/ABSN URINE INCON ASSESS: CPT | Performed by: NURSE PRACTITIONER

## 2022-02-08 PROCEDURE — 4004F PT TOBACCO SCREEN RCVD TLK: CPT | Performed by: NURSE PRACTITIONER

## 2022-02-08 PROCEDURE — 4040F PNEUMOC VAC/ADMIN/RCVD: CPT | Performed by: NURSE PRACTITIONER

## 2022-02-08 RX ORDER — HYDRALAZINE HYDROCHLORIDE 50 MG/1
50 TABLET, FILM COATED ORAL 2 TIMES DAILY
Qty: 60 TABLET | Refills: 5
Start: 2022-02-08 | End: 2022-03-07 | Stop reason: DRUGHIGH

## 2022-02-08 NOTE — TELEPHONE ENCOUNTER
The patient called requesting to speak with Jesús Franklin NP regarding her B/P. The patient states her reading are up and down. The patient states for the past 2 weeks the readings have been between 150 and 140.     02/06/22 146/83 HR 87    02/07/22 Not Recorded     02/08/22 121/82 HR 80    The patient did not provide any other readings     Please call the patient back at [phone] to advise.

## 2022-02-08 NOTE — TELEPHONE ENCOUNTER
Spoke with patient, she is unable to come in today due to transportation. Set up virtual visit with Harsha Bar NP at 2:30.

## 2022-02-08 NOTE — PROGRESS NOTES
Missy Treviño is a 74 y.o. female evaluated via telephone on 2/8/2022. Consent:  She and/or health care decision maker is aware that that she may receive a bill for this telephone service, which includes applicable co-pays, depending on her insurance coverage, and has provided verbal consent to proceed. Documentation:  I communicated with the patient and/or health care decision maker about HTN. Details of this discussion including any medical advice provided: yes      I affirm this is a Patient Initiated Episode with a Patient who has not had a related appointment within my department in the past 7 days or scheduled within the next 24 hours. Patient identification was verified at the start of the visit: Yes    Total Time: minutes: 5-10 minutes    Missy Treviño was evaluated through a synchronous (real-time) audio encounter. The patient was located at home in a state where the provider was licensed to provide care. Note: not billable if this call serves to triage the patient into an appointment for the relevant concern      Raman Corado, APRN - CNP         CC HTN    HPI:  74 y.o. patient of Dr Suze Arredondo with HTN, HLD, and LBBB presented for a telephone visit for BP management. Bp at home 120-140's/'s. She denies cp, sob, LH/dizziness, palpitations or syncope. No LE edema, orthopnea or PND. No fever, n/v/d or GI/ bleeding.      Past Medical History:   Diagnosis Date    Anxiety, generalized     CPAP (continuous positive airway pressure) dependence     Diabetes mellitus ([address]     no meds    Diverticulitis     H/O ischemic bowel disease 03/2016    Hepatitis C     History of blood transfusion     s/p hysterectomy    History of kidney stones     x 1 episode    Hyperlipidemia     LBBB (left bundle branch block)     Loose stools     secondary to history of colectomy    Paranoid schizophrenia ([address]     Proteinuria     history of    Rectal prolapse     Retention of urine     Sciatica  Sleep apnea     USES CPAP    Sleep apnea     Wears partial dentures      Past Surgical History:   Procedure Laterality Date    ABDOMEN SURGERY  2016    OPEN ILEOSTOMY TAKEDOWN                   COLONOSCOPY      COLPOPEXY N/A 2019    OPEN RECTOPEXY, LYSIS OF ADHESIONS, INTRAOPERATIVE FLEXIBLE SIGMOIDOSCOPY [facility]    CYSTOSCOPY, BILATERAL URETERAL STENT INSERTION performed by Amanda Toussaint MD at [address] [facility]      w/tubaligation    HYSTERECTOMY      PARTIAL VAGINAL    JOINT REPLACEMENT Bilateral     knees    KIDNEY STONE SURGERY      KIDNEY STONE SURGERY      KNEE SURGERY      bilateral knee replacements    OTHER SURGICAL HISTORY  3/5/16    DIAGNOSTIC LAPAROSCOPY, SUBTOTAL COLECTOMY, COLOSTOMY,    [place] COLONOSCOPY FLX DX W/COLLJ SPEC WHEN PFRMD N/A 10/25/2018    COLONOSCOPY, POSSIBLE POLYPECTOMY WITH MAC performed by Christine Brunson MD at [address] [place] [address] N/A 3/23/2021    EXAM UNDER ANESTHESIA, DELORME PROCEDURE performed by Christine Brunson MD at [place] [address] N/A 2021    OPEN INCISIONAL HERNIA REPAIR performed by Baltazar Manuel MD at [address] [place] [address]       No family history on file. Social History     Tobacco Use    Smoking status: Current Every Day Smoker     Packs/day: 0.50     Years: 20.00     Pack years: 10.00     Types: Cigarettes     Start date: 3/22/2001     Last attempt to quit: 2016     Years since quittin.9    Smokeless tobacco: Never Used   Vaping Use    Vaping Use: Never used   Substance Use Topics    Alcohol use: No    Drug use: No     Allergies:Patient has no known allergies. Review of Systems  General: No changes in weight, fatigue, or night sweats. HEENT: No blurry or decreased vision. No changes in hearing, nasal discharge or sore throat. Cardiovascular:  See HPI.    Respiratory: No cough, hemoptysis, or wheezing. Gastrointestinal:  No abdominal pain, hematochezia, melana, constipation, diarrhea, or history of GI ulcers. Genito-Urinary: No dysuria or hematuria. No urgency or polyuria. Musculoskeletal:  No complaints of joint pain, joint swelling or muscular weakness/soreness. Neurological:  No dizziness, numbness/tingling, speech problems or weakness. Psychological:  No anxiety or depression. Hematological and Lymphatic: No abnormal bleeding or bruising, blood clots, jaundice or swollen lymph nodes. Endocrine:   No malaise/lethargy, palpitations, polydipsia/polyuria, temperature intolerance or unexpected weight changes  Skin:  No rashes or non-healing ulcers.     Physical Exam:  (reported from home)  121/82, 80 bpm  Weight 155 lbs    Weight  Wt Readings from Last 3 Encounters:   01/27/22 160 lb (72.6 kg)   01/21/22 170 lb (77.1 kg)   12/20/21 171 lb (77.6 kg)          CBC:   Lab Results   Component Value Date    WBC 9.1 09/05/2021    HGB 11.2 (L) 09/05/2021    HCT 31.6 (L) 09/05/2021    MCV 95.6 09/05/2021     09/05/2021     BMP:  Lab Results   Component Value Date    CREATININE 0.8 01/17/2022    BUN 10 01/17/2022     01/17/2022    K 4.5 01/17/2022     01/17/2022    CO2 26 01/17/2022     Mag:   Lab Results   Component Value Date    MG 2.10 09/05/2021     LIVER PROFILE:   Lab Results   Component Value Date    ALT 19 03/08/2021    AST 21 03/08/2021    ALKPHOS 61 03/08/2021    BILITOT <0.2 03/08/2021     PT/INR:   Lab Results   Component Value Date    INR 1.11 03/23/2021    INR 1.14 04/26/2019    INR 1.18 (H) 10/24/2016    PROTIME 12.9 03/23/2021    PROTIME 13.0 04/26/2019    PROTIME 13.5 (H) 10/24/2016     Pro-BNP No results found for: PROBNP  LIPIDS:  No components found for: CHLPL  Lab Results   Component Value Date    TRIG 131 03/08/2021    TRIG 116 07/13/2020    TRIG 135 07/03/2019     Lab Results   Component Value Date    HDL 74 (H) 03/08/2021    HDL 53 07/13/2020    HDL 77 (H) 07/03/2019     Lab Results   Component Value Date    LDLCALC 62 03/08/2021    LDLCALC 42 07/13/2020    LDLCALC 66 07/03/2019     Lab Results   Component Value Date    LABVLDL 26 03/08/2021    LABVLDL 23 07/13/2020    LABVLDL 27 07/03/2019     TSH:No results found for: TSH, X6TYZPW, M0SYPFH, THYROIDAB    IMAGING:     3/2021 ECG: Sinus, LAD, LBBB    3/15/2021 Nuc stress:      There is normal isotope uptake at stress and rest. There is no evidence of    myocardial ischemia or scar.    Normal LV size and systolic function.    Left ventricular ejection fraction of 65 %.    There are no regional wall motion abnormalities.    Overall findings represent a low risk scan. 2017 Echo:   Normal left ventricular size and wall thickness. Low normal left ventricular   systolic function with an estimated ejection fraction of 50-55%. There is   abnormal septal motion, possibly due to abnormal interventricular   conduction. Trace tricuspid regurgitation. RVSP 32 mmHg. No significant valvular regurgitation or stenosis. Medications:   Current Outpatient Medications   Medication Sig Dispense Refill    losartan (COZAAR) 100 MG tablet Take 1 tablet by mouth daily 90 tablet 3    hydrALAZINE (APRESOLINE) 25 MG tablet Take 1 tablet by mouth 2 times daily 180 tablet 3    VICTOZA 18 MG/3ML SOPN SC injection INJECT 0.6MG MG SUBCUTANEOUSLY ONCE DAILY      glipiZIDE (GLUCOTROL XL) 5 MG extended release tablet Take 5 mg by mouth daily (with breakfast)      amLODIPine (NORVASC) 10 MG tablet Take 1 tablet by mouth daily 90 tablet 3    polyethylene glycol (MIRALAX) 17 g PACK packet Take 17 g by mouth daily       zolpidem (AMBIEN) 10 MG tablet Take 10 mg by mouth nightly.        acetaminophen (TYLENOL) 325 MG tablet Take 325 mg by mouth as needed       DULoxetine (CYMBALTA) 20 MG extended release capsule Take 20 mg by mouth daily      loperamide (RA ANTI-DIARRHEAL) 2 MG capsule Take 1 capsule by mouth 4 times daily as needed for Diarrhea 90 capsule 5    Multiple Vitamins-Minerals (MULTIVITAMIN PO) Take by mouth      LORazepam (ATIVAN) 0.5 MG tablet Take 1 tablet by mouth every 12 hours as needed (Patient taking differently: Take 0.5 mg by mouth 2 times daily. ) 60 tablet 0    lurasidone (LATUDA) 40 MG TABS tablet Take 1 tablet by mouth daily 30 tablet 0    famotidine (PEPCID) 20 MG tablet Take 1 tablet by mouth 2 times daily 60 tablet 3    atorvastatin (LIPITOR) 40 MG tablet Take 40 mg by mouth daily      albuterol sulfate  (90 BASE) MCG/ACT inhaler Inhale 2 puffs into the lungs every 6 hours as needed for Wheezing      diphenhydrAMINE (BENADRYL) 25 MG capsule Take 25-50 mg by mouth nightly as needed for Sleep        No current facility-administered medications for this visit. Assessment:  1. Essential hypertension    2. Mixed hyperlipidemia    3. LBBB (left bundle branch block)        Plan:  HTN (uncontrolled)    Increase hydralazine to 50 mg po bid    Send in BP readings in 2 weeks. Will adjust meds if needed.     Losartan 100 mg po daily    amlodipine to 10 mg po daily     HLD; stable   LDL 62   Cont lipitor  LBBB: stable   Has had LBBB for years     Follow up in 2 weeks    Reviewed most recent: CBC, BMP, LFT, Lipids, Mag, PT/INR,   Reviewed most recent: ECG, Echo, Nuc stress test,

## 2022-02-24 ENCOUNTER — TELEPHONE (OUTPATIENT)
Dept: CARDIOLOGY CLINIC | Age: 67
End: 2022-02-24

## 2022-02-24 NOTE — TELEPHONE ENCOUNTER
Richardnate Higinio here her last Bp and HR    148 80 89  127 71 79  114 70 80  121 71 88  134 74 86  117 68 88  136 72 88  127 77 85  131 74 80  134 75 93  90 76  78  131 72 80  120 42 78  120 70 83  120 69 86    Patient has an appointment with Dr. Kartik Hui in May. Patient would like a call back if she needs to make any changes.

## 2022-03-01 ENCOUNTER — TELEPHONE (OUTPATIENT)
Dept: CARDIOLOGY CLINIC | Age: 67
End: 2022-03-01

## 2022-03-01 NOTE — TELEPHONE ENCOUNTER
The patient called to inform our office that she received a letter from her insurance company stating her VV with [facility] NP on 02/08/22 was denied.  The letter also stated that our office needs to send a prior authorization confirmation number or an appeal letter to the claims office      Fax number [phone]    The patient's insurance is   [facility]/[address]

## 2022-03-03 NOTE — TELEPHONE ENCOUNTER
After further review of her account-it looks like this may have been taken care of on the back end regarding claims and status.

## 2022-03-07 RX ORDER — HYDRALAZINE HYDROCHLORIDE 50 MG/1
50 TABLET, FILM COATED ORAL 2 TIMES DAILY
Qty: 180 TABLET | Refills: 0 | Status: SHIPPED
Start: 2022-03-07 | End: 2022-03-07

## 2022-03-07 RX ORDER — HYDRALAZINE HYDROCHLORIDE 50 MG/1
50 TABLET, FILM COATED ORAL 2 TIMES DAILY
Qty: 180 TABLET | Refills: 3 | Status: SHIPPED | OUTPATIENT
Start: 2022-03-07

## 2023-04-21 ENCOUNTER — HOSPITAL ENCOUNTER (OUTPATIENT)
Dept: MAMMOGRAPHY | Age: 68
End: 2023-04-21
Payer: COMMERCIAL

## 2023-04-21 VITALS — WEIGHT: 165 LBS | BODY MASS INDEX: 33.26 KG/M2 | HEIGHT: 59 IN

## 2023-04-21 DIAGNOSIS — Z12.31 VISIT FOR SCREENING MAMMOGRAM: ICD-10-CM

## 2023-04-21 PROCEDURE — 77067 SCR MAMMO BI INCL CAD: CPT

## 2023-05-09 ENCOUNTER — HOSPITAL ENCOUNTER (OUTPATIENT)
Dept: ULTRASOUND IMAGING | Age: 68
Discharge: HOME | End: 2023-05-09
Payer: COMMERCIAL

## 2023-05-09 ENCOUNTER — HOSPITAL ENCOUNTER (OUTPATIENT)
Dept: MAMMOGRAPHY | Age: 68
Discharge: HOME | End: 2023-05-09
Payer: COMMERCIAL

## 2023-05-09 DIAGNOSIS — R92.8 ABNORMAL MAMMOGRAM: ICD-10-CM

## 2023-05-09 PROCEDURE — 76642 ULTRASOUND BREAST LIMITED: CPT

## 2023-05-09 PROCEDURE — 77065 DX MAMMO INCL CAD UNI: CPT

## 2023-05-18 ENCOUNTER — HOSPITAL ENCOUNTER (OUTPATIENT)
Dept: ULTRASOUND IMAGING | Age: 68
Discharge: HOME | End: 2023-05-18
Payer: COMMERCIAL

## 2023-05-18 ENCOUNTER — HOSPITAL ENCOUNTER (OUTPATIENT)
Dept: MAMMOGRAPHY | Age: 68
Discharge: HOME | End: 2023-05-18
Payer: COMMERCIAL

## 2023-05-18 DIAGNOSIS — R93.89 ABNORMAL ULTRASOUND: ICD-10-CM

## 2023-05-18 DIAGNOSIS — R92.8 ABNORMAL MAMMOGRAM: ICD-10-CM

## 2023-05-18 PROCEDURE — 88341 IMHCHEM/IMCYTCHM EA ADD ANTB: CPT

## 2023-05-18 PROCEDURE — 19083 BX BREAST 1ST LESION US IMAG: CPT

## 2023-05-18 PROCEDURE — 88342 IMHCHEM/IMCYTCHM 1ST ANTB: CPT

## 2023-05-18 PROCEDURE — 77065 DX MAMMO INCL CAD UNI: CPT

## 2023-05-18 PROCEDURE — 88305 TISSUE EXAM BY PATHOLOGIST: CPT

## 2023-05-18 PROCEDURE — 88360 TUMOR IMMUNOHISTOCHEM/MANUAL: CPT

## 2023-05-22 ENCOUNTER — TELEPHONE (OUTPATIENT)
Dept: BREAST CENTER | Age: 68
End: 2023-05-22

## 2023-05-30 ENCOUNTER — OFFICE VISIT (OUTPATIENT)
Dept: SURGERY | Age: 68
End: 2023-05-30
Payer: COMMERCIAL

## 2023-05-30 VITALS
BODY MASS INDEX: 33.33 KG/M2 | OXYGEN SATURATION: 96 % | DIASTOLIC BLOOD PRESSURE: 68 MMHG | WEIGHT: 165 LBS | HEART RATE: 71 BPM | SYSTOLIC BLOOD PRESSURE: 122 MMHG

## 2023-05-30 DIAGNOSIS — C50.411 MALIGNANT NEOPLASM OF UPPER-OUTER QUADRANT OF RIGHT BREAST IN FEMALE, ESTROGEN RECEPTOR POSITIVE (HCC): Primary | ICD-10-CM

## 2023-05-30 DIAGNOSIS — Z17.0 MALIGNANT NEOPLASM OF UPPER-OUTER QUADRANT OF RIGHT BREAST IN FEMALE, ESTROGEN RECEPTOR POSITIVE (HCC): Primary | ICD-10-CM

## 2023-05-30 DIAGNOSIS — Z80.3 FAMILY HISTORY OF BREAST CANCER: ICD-10-CM

## 2023-05-30 PROCEDURE — 99205 OFFICE O/P NEW HI 60 MIN: CPT | Performed by: SURGERY

## 2023-05-30 PROCEDURE — G8417 CALC BMI ABV UP PARAM F/U: HCPCS | Performed by: SURGERY

## 2023-05-30 PROCEDURE — 1123F ACP DISCUSS/DSCN MKR DOCD: CPT | Performed by: SURGERY

## 2023-05-30 PROCEDURE — 1036F TOBACCO NON-USER: CPT | Performed by: SURGERY

## 2023-05-30 PROCEDURE — 3017F COLORECTAL CA SCREEN DOC REV: CPT | Performed by: SURGERY

## 2023-05-30 PROCEDURE — G8399 PT W/DXA RESULTS DOCUMENT: HCPCS | Performed by: SURGERY

## 2023-05-30 PROCEDURE — 1090F PRES/ABSN URINE INCON ASSESS: CPT | Performed by: SURGERY

## 2023-05-30 PROCEDURE — G8427 DOCREV CUR MEDS BY ELIG CLIN: HCPCS | Performed by: SURGERY

## 2023-05-30 NOTE — PROGRESS NOTES
2023    Chief Complaint   Patient presents with    Surgical Consult     PCP/ Referral- JEVON Vincent R breast invasive ductal carcinoma, no complaints at this time         HPI Patient underwent a recent screening mammogram, then right diagnostic and ultrasound showing scattered areas of fibroglandular density and a 9 mm right breast mass, 10:00, 12 cmfn. Normal axillary nodes. Core biopsy of the mass showed grade 2 invasive ductal carcinoma, ER/ME positive, Her2 negative. Patient has not felt any breast masses, no breast pain or nipple discharge.      Breast History:  History of Previous Breast Biopsy:23  Self Breast Exams Completed:N/A  Family History of Breast Cancer:Yes, Mother, sister,(M) aunts x2  Family History of Other Cancers: (M) Leukemia  Ashkenazi Mandaen Decent:No  Bra Size: Large sports bra        Gyne History  : 4  Para: 3  Age of Menarche: 11  Age of Menopause: 26 had hysterectomy  Age of first live Birth:16   History of Hysterectomy / RENATO-BSO: Yes, still has ovaries   History of OCP's: Yes, 10 years  HRT: No  Family History or personal history of Ovarian Cancer: No     Lifestyle:  Diet: Diabetic  Smoker ( Current / Former ): Former, quit 2023  ETOH ( alcohol consumption ): No  Exercise:walking, house work  Caffeine: two 16 oz bottles of ice tea daily  Drug use ( including THC/ [place] ): No      Current Outpatient Medications:     hydrALAZINE (APRESOLINE) 50 MG tablet, Take 1 tablet by mouth 2 times daily, Disp: 180 tablet, Rfl: 3    losartan (COZAAR) 100 MG tablet, Take 1 tablet by mouth daily, Disp: 90 tablet, Rfl: 3    VICTOZA 18 MG/3ML SOPN SC injection, INJECT 0.6MG MG SUBCUTANEOUSLY ONCE DAILY, Disp: , Rfl:     glipiZIDE (GLUCOTROL XL) 5 MG extended release tablet, Take 1 tablet by mouth daily (with breakfast), Disp: , Rfl:     amLODIPine (NORVASC) 10 MG tablet, Take 1 tablet by mouth daily, Disp: 90 tablet, Rfl: 3    polyethylene glycol (MIRALAX) 17 g PACK

## 2023-05-30 NOTE — PATIENT INSTRUCTIONS
Discussed pathology results, invasive ductal carcinoma grade 2, stage 1A, ER +,     Risk vs benefits of surgery     mastectomy vs lumpectomy    [facility] for treatment    Plan:RFID tag placement, R breast lumpectomy, out patient surgery, breast check in 6 months, Nurse navigator will call to set up surgery appointment and tag placement, Make appointment with PCP for pre op

## 2023-06-02 ENCOUNTER — HOSPITAL ENCOUNTER (OUTPATIENT)
Dept: MAMMOGRAPHY | Age: 68
Discharge: HOME | End: 2023-06-02
Payer: COMMERCIAL

## 2023-06-02 ENCOUNTER — HOSPITAL ENCOUNTER (OUTPATIENT)
Dept: ULTRASOUND IMAGING | Age: 68
Discharge: HOME | End: 2023-06-02
Payer: COMMERCIAL

## 2023-06-02 DIAGNOSIS — R92.8 ABNORMAL MAMMOGRAM: ICD-10-CM

## 2023-06-02 DIAGNOSIS — C50.411 MALIGNANT NEOPLASM OF UPPER-OUTER QUADRANT OF RIGHT BREAST IN FEMALE, ESTROGEN RECEPTOR POSITIVE (HCC): ICD-10-CM

## 2023-06-02 DIAGNOSIS — Z17.0 MALIGNANT NEOPLASM OF UPPER-OUTER QUADRANT OF RIGHT BREAST IN FEMALE, ESTROGEN RECEPTOR POSITIVE (HCC): ICD-10-CM

## 2023-06-02 DIAGNOSIS — Z80.3 FAMILY HISTORY OF BREAST CANCER: ICD-10-CM

## 2023-06-02 PROCEDURE — 77065 DX MAMMO INCL CAD UNI: CPT

## 2023-06-02 PROCEDURE — 19285 PERQ DEV BREAST 1ST US IMAG: CPT

## 2023-06-09 ENCOUNTER — HOSPITAL ENCOUNTER (OUTPATIENT)
Age: 68
Setting detail: OUTPATIENT SURGERY
Discharge: HOME | End: 2023-06-09
Attending: SURGERY | Admitting: SURGERY
Payer: COMMERCIAL

## 2023-06-09 ENCOUNTER — ANESTHESIA (OUTPATIENT)
Dept: OPERATING ROOM | Age: 68
End: 2023-06-09
Payer: COMMERCIAL

## 2023-06-09 ENCOUNTER — ANESTHESIA EVENT (OUTPATIENT)
Dept: OPERATING ROOM | Age: 68
End: 2023-06-09
Payer: COMMERCIAL

## 2023-06-09 ENCOUNTER — HOSPITAL ENCOUNTER (OUTPATIENT)
Dept: MAMMOGRAPHY | Age: 68
Discharge: HOME | End: 2023-06-09
Payer: COMMERCIAL

## 2023-06-09 VITALS
DIASTOLIC BLOOD PRESSURE: 93 MMHG | RESPIRATION RATE: 16 BRPM | TEMPERATURE: 97.1 F | HEIGHT: 59 IN | HEART RATE: 61 BPM | SYSTOLIC BLOOD PRESSURE: 144 MMHG | BODY MASS INDEX: 35.44 KG/M2 | WEIGHT: 175.8 LBS | OXYGEN SATURATION: 92 %

## 2023-06-09 DIAGNOSIS — C50.411 MALIGNANT NEOPLASM OF UPPER-OUTER QUADRANT OF RIGHT FEMALE BREAST, UNSPECIFIED ESTROGEN RECEPTOR STATUS (HCC): ICD-10-CM

## 2023-06-09 DIAGNOSIS — R92.8 ABNORMAL MAMMOGRAM: ICD-10-CM

## 2023-06-09 LAB
GLUCOSE BLD-MCNC: 130 MG/DL (ref 70–99)
GLUCOSE BLD-MCNC: 144 MG/DL (ref 70–99)
PERFORMED ON: ABNORMAL
PERFORMED ON: ABNORMAL

## 2023-06-09 PROCEDURE — 3700000001 HC ADD 15 MINUTES (ANESTHESIA): Performed by: SURGERY

## 2023-06-09 PROCEDURE — 2580000003 HC RX 258: Performed by: STUDENT IN AN ORGANIZED HEALTH CARE EDUCATION/TRAINING PROGRAM

## 2023-06-09 PROCEDURE — 2500000003 HC RX 250 WO HCPCS: Performed by: STUDENT IN AN ORGANIZED HEALTH CARE EDUCATION/TRAINING PROGRAM

## 2023-06-09 PROCEDURE — 2580000003 HC RX 258: Performed by: FAMILY MEDICINE

## 2023-06-09 PROCEDURE — 2580000003 HC RX 258: Performed by: SURGERY

## 2023-06-09 PROCEDURE — 6360000002 HC RX W HCPCS: Performed by: STUDENT IN AN ORGANIZED HEALTH CARE EDUCATION/TRAINING PROGRAM

## 2023-06-09 PROCEDURE — 7100000001 HC PACU RECOVERY - ADDTL 15 MIN: Performed by: SURGERY

## 2023-06-09 PROCEDURE — 3600000004 HC SURGERY LEVEL 4 BASE: Performed by: SURGERY

## 2023-06-09 PROCEDURE — 7100000011 HC PHASE II RECOVERY - ADDTL 15 MIN: Performed by: SURGERY

## 2023-06-09 PROCEDURE — 7100000010 HC PHASE II RECOVERY - FIRST 15 MIN: Performed by: SURGERY

## 2023-06-09 PROCEDURE — 76098 X-RAY EXAM SURGICAL SPECIMEN: CPT

## 2023-06-09 PROCEDURE — 3600000014 HC SURGERY LEVEL 4 ADDTL 15MIN: Performed by: SURGERY

## 2023-06-09 PROCEDURE — A4217 STERILE WATER/SALINE, 500 ML: HCPCS | Performed by: SURGERY

## 2023-06-09 PROCEDURE — 2709999900 HC NON-CHARGEABLE SUPPLY: Performed by: SURGERY

## 2023-06-09 PROCEDURE — 7100000000 HC PACU RECOVERY - FIRST 15 MIN: Performed by: SURGERY

## 2023-06-09 PROCEDURE — 3700000000 HC ANESTHESIA ATTENDED CARE: Performed by: SURGERY

## 2023-06-09 PROCEDURE — 6360000002 HC RX W HCPCS: Performed by: SURGERY

## 2023-06-09 PROCEDURE — 2720000010 HC SURG SUPPLY STERILE: Performed by: SURGERY

## 2023-06-09 PROCEDURE — 2500000003 HC RX 250 WO HCPCS: Performed by: SURGERY

## 2023-06-09 PROCEDURE — 6370000000 HC RX 637 (ALT 250 FOR IP): Performed by: SURGERY

## 2023-06-09 RX ORDER — ONDANSETRON 2 MG/ML
INJECTION INTRAMUSCULAR; INTRAVENOUS PRN
Status: DISCONTINUED | OUTPATIENT
Start: 2023-06-09 | End: 2023-06-09 | Stop reason: SDUPTHER

## 2023-06-09 RX ORDER — SODIUM CHLORIDE 0.9 % (FLUSH) 0.9 %
5-40 SYRINGE (ML) INJECTION EVERY 12 HOURS SCHEDULED
Status: DISCONTINUED | OUTPATIENT
Start: 2023-06-09 | End: 2023-06-09 | Stop reason: HOSPADM

## 2023-06-09 RX ORDER — SODIUM CHLORIDE 9 MG/ML
INJECTION, SOLUTION INTRAVENOUS PRN
Status: DISCONTINUED | OUTPATIENT
Start: 2023-06-09 | End: 2023-06-09 | Stop reason: HOSPADM

## 2023-06-09 RX ORDER — LORAZEPAM 2 MG/ML
0.5 INJECTION INTRAMUSCULAR
Status: DISCONTINUED | OUTPATIENT
Start: 2023-06-09 | End: 2023-06-09 | Stop reason: HOSPADM

## 2023-06-09 RX ORDER — SODIUM CHLORIDE 0.9 % (FLUSH) 0.9 %
5-40 SYRINGE (ML) INJECTION PRN
Status: DISCONTINUED | OUTPATIENT
Start: 2023-06-09 | End: 2023-06-09 | Stop reason: HOSPADM

## 2023-06-09 RX ORDER — ACETAMINOPHEN 650 MG/1
650 SUPPOSITORY RECTAL
Status: DISCONTINUED | OUTPATIENT
Start: 2023-06-09 | End: 2023-06-09 | Stop reason: HOSPADM

## 2023-06-09 RX ORDER — HYDROMORPHONE HYDROCHLORIDE 1 MG/ML
0.5 INJECTION, SOLUTION INTRAMUSCULAR; INTRAVENOUS; SUBCUTANEOUS EVERY 5 MIN PRN
Status: DISCONTINUED | OUTPATIENT
Start: 2023-06-09 | End: 2023-06-09 | Stop reason: HOSPADM

## 2023-06-09 RX ORDER — FENTANYL CITRATE 50 UG/ML
INJECTION, SOLUTION INTRAMUSCULAR; INTRAVENOUS PRN
Status: DISCONTINUED | OUTPATIENT
Start: 2023-06-09 | End: 2023-06-09 | Stop reason: SDUPTHER

## 2023-06-09 RX ORDER — MIDAZOLAM HYDROCHLORIDE 1 MG/ML
INJECTION INTRAMUSCULAR; INTRAVENOUS PRN
Status: DISCONTINUED | OUTPATIENT
Start: 2023-06-09 | End: 2023-06-09 | Stop reason: SDUPTHER

## 2023-06-09 RX ORDER — GLYCOPYRROLATE 0.2 MG/ML
INJECTION INTRAMUSCULAR; INTRAVENOUS PRN
Status: DISCONTINUED | OUTPATIENT
Start: 2023-06-09 | End: 2023-06-09 | Stop reason: SDUPTHER

## 2023-06-09 RX ORDER — SODIUM CHLORIDE, SODIUM LACTATE, POTASSIUM CHLORIDE, CALCIUM CHLORIDE 600; 310; 30; 20 MG/100ML; MG/100ML; MG/100ML; MG/100ML
INJECTION, SOLUTION INTRAVENOUS CONTINUOUS
Status: DISCONTINUED | OUTPATIENT
Start: 2023-06-09 | End: 2023-06-09 | Stop reason: HOSPADM

## 2023-06-09 RX ORDER — MAGNESIUM HYDROXIDE 1200 MG/15ML
LIQUID ORAL CONTINUOUS PRN
Status: DISCONTINUED | OUTPATIENT
Start: 2023-06-09 | End: 2023-06-09 | Stop reason: HOSPADM

## 2023-06-09 RX ORDER — ACETAMINOPHEN 325 MG/1
650 TABLET ORAL
Status: COMPLETED | OUTPATIENT
Start: 2023-06-09 | End: 2023-06-09

## 2023-06-09 RX ORDER — LIDOCAINE HYDROCHLORIDE 20 MG/ML
INJECTION, SOLUTION INFILTRATION; PERINEURAL PRN
Status: DISCONTINUED | OUTPATIENT
Start: 2023-06-09 | End: 2023-06-09 | Stop reason: SDUPTHER

## 2023-06-09 RX ORDER — FENTANYL CITRATE 50 UG/ML
25 INJECTION, SOLUTION INTRAMUSCULAR; INTRAVENOUS EVERY 5 MIN PRN
Status: DISCONTINUED | OUTPATIENT
Start: 2023-06-09 | End: 2023-06-09 | Stop reason: HOSPADM

## 2023-06-09 RX ORDER — PROPOFOL 10 MG/ML
INJECTION, EMULSION INTRAVENOUS PRN
Status: DISCONTINUED | OUTPATIENT
Start: 2023-06-09 | End: 2023-06-09 | Stop reason: SDUPTHER

## 2023-06-09 RX ORDER — LABETALOL HYDROCHLORIDE 5 MG/ML
10 INJECTION, SOLUTION INTRAVENOUS
Status: DISCONTINUED | OUTPATIENT
Start: 2023-06-09 | End: 2023-06-09 | Stop reason: HOSPADM

## 2023-06-09 RX ORDER — PROCHLORPERAZINE EDISYLATE 5 MG/ML
5 INJECTION INTRAMUSCULAR; INTRAVENOUS
Status: DISCONTINUED | OUTPATIENT
Start: 2023-06-09 | End: 2023-06-09 | Stop reason: HOSPADM

## 2023-06-09 RX ORDER — MEPERIDINE HYDROCHLORIDE 25 MG/ML
12.5 INJECTION INTRAMUSCULAR; INTRAVENOUS; SUBCUTANEOUS EVERY 5 MIN PRN
Status: DISCONTINUED | OUTPATIENT
Start: 2023-06-09 | End: 2023-06-09 | Stop reason: HOSPADM

## 2023-06-09 RX ORDER — OXYCODONE HYDROCHLORIDE AND ACETAMINOPHEN 5; 325 MG/1; MG/1
1 TABLET ORAL EVERY 6 HOURS PRN
Qty: 8 TABLET | Refills: 0 | Status: SHIPPED | OUTPATIENT
Start: 2023-06-09 | End: 2023-06-12

## 2023-06-09 RX ORDER — ONDANSETRON 2 MG/ML
4 INJECTION INTRAMUSCULAR; INTRAVENOUS
Status: DISCONTINUED | OUTPATIENT
Start: 2023-06-09 | End: 2023-06-09 | Stop reason: HOSPADM

## 2023-06-09 RX ORDER — DEXAMETHASONE SODIUM PHOSPHATE 4 MG/ML
INJECTION, SOLUTION INTRA-ARTICULAR; INTRALESIONAL; INTRAMUSCULAR; INTRAVENOUS; SOFT TISSUE PRN
Status: DISCONTINUED | OUTPATIENT
Start: 2023-06-09 | End: 2023-06-09 | Stop reason: SDUPTHER

## 2023-06-09 RX ORDER — BUPIVACAINE HYDROCHLORIDE 5 MG/ML
INJECTION, SOLUTION EPIDURAL; INTRACAUDAL PRN
Status: DISCONTINUED | OUTPATIENT
Start: 2023-06-09 | End: 2023-06-09 | Stop reason: HOSPADM

## 2023-06-09 RX ADMIN — MIDAZOLAM HYDROCHLORIDE 2 MG: 2 INJECTION, SOLUTION INTRAMUSCULAR; INTRAVENOUS at 10:17

## 2023-06-09 RX ADMIN — PHENYLEPHRINE HYDROCHLORIDE 100 MCG: 10 INJECTION, SOLUTION INTRAMUSCULAR; INTRAVENOUS; SUBCUTANEOUS at 10:38

## 2023-06-09 RX ADMIN — PHENYLEPHRINE HYDROCHLORIDE 100 MCG: 10 INJECTION, SOLUTION INTRAMUSCULAR; INTRAVENOUS; SUBCUTANEOUS at 10:45

## 2023-06-09 RX ADMIN — SODIUM CHLORIDE, POTASSIUM CHLORIDE, SODIUM LACTATE AND CALCIUM CHLORIDE: 600; 310; 30; 20 INJECTION, SOLUTION INTRAVENOUS at 08:59

## 2023-06-09 RX ADMIN — DEXAMETHASONE SODIUM PHOSPHATE 4 MG: 4 INJECTION, SOLUTION INTRAMUSCULAR; INTRAVENOUS at 10:34

## 2023-06-09 RX ADMIN — PROPOFOL 120 MCG/KG/MIN: 10 INJECTION, EMULSION INTRAVENOUS at 10:19

## 2023-06-09 RX ADMIN — DEXMEDETOMIDINE HYDROCHLORIDE 4 MCG: 100 INJECTION, SOLUTION INTRAVENOUS at 10:20

## 2023-06-09 RX ADMIN — FENTANYL CITRATE 25 MCG: 50 INJECTION, SOLUTION INTRAMUSCULAR; INTRAVENOUS at 10:53

## 2023-06-09 RX ADMIN — PHENYLEPHRINE HYDROCHLORIDE 100 MCG: 10 INJECTION, SOLUTION INTRAMUSCULAR; INTRAVENOUS; SUBCUTANEOUS at 10:51

## 2023-06-09 RX ADMIN — PROPOFOL 20 MG: 10 INJECTION, EMULSION INTRAVENOUS at 10:17

## 2023-06-09 RX ADMIN — FENTANYL CITRATE 50 MCG: 50 INJECTION, SOLUTION INTRAMUSCULAR; INTRAVENOUS at 10:17

## 2023-06-09 RX ADMIN — ONDANSETRON 4 MG: 2 INJECTION INTRAMUSCULAR; INTRAVENOUS at 10:34

## 2023-06-09 RX ADMIN — DEXMEDETOMIDINE HYDROCHLORIDE 4 MCG: 100 INJECTION, SOLUTION INTRAVENOUS at 10:23

## 2023-06-09 RX ADMIN — LIDOCAINE HYDROCHLORIDE 100 MG: 20 INJECTION, SOLUTION INFILTRATION; PERINEURAL at 10:17

## 2023-06-09 RX ADMIN — ACETAMINOPHEN 650 MG: 325 TABLET ORAL at 09:03

## 2023-06-09 RX ADMIN — PHENYLEPHRINE HYDROCHLORIDE 100 MCG: 10 INJECTION, SOLUTION INTRAMUSCULAR; INTRAVENOUS; SUBCUTANEOUS at 11:24

## 2023-06-09 RX ADMIN — GLYCOPYRROLATE 0.2 MG: 0.2 INJECTION INTRAMUSCULAR; INTRAVENOUS at 10:34

## 2023-06-09 RX ADMIN — DEXMEDETOMIDINE HYDROCHLORIDE 4 MCG: 100 INJECTION, SOLUTION INTRAVENOUS at 10:17

## 2023-06-09 RX ADMIN — CEFAZOLIN 2000 MG: 2 INJECTION, POWDER, FOR SOLUTION INTRAMUSCULAR; INTRAVENOUS at 10:27

## 2023-06-09 RX ADMIN — FENTANYL CITRATE 25 MCG: 50 INJECTION, SOLUTION INTRAMUSCULAR; INTRAVENOUS at 10:40

## 2023-06-09 ASSESSMENT — PAIN SCALES - GENERAL
PAINLEVEL_OUTOF10: 0
PAINLEVEL_OUTOF10: 0

## 2023-06-09 ASSESSMENT — PAIN - FUNCTIONAL ASSESSMENT: PAIN_FUNCTIONAL_ASSESSMENT: 0-10

## 2023-06-09 NOTE — PROGRESS NOTES
Ambulatory Surgery/Procedure Discharge Note    Vitals:    06/09/23 1230   BP: (!) 144/93   Pulse: 61   Resp: 16   Temp:    SpO2: 92%   Bp meets francy score criteria for discharge home. In: 550 [I.V.:500]  Out: -     Restroom use offered before discharge. Yes    Pain assessment:  none  Pain Level: 0    Pt awake and alert upon arrival to [facility]. Pt declines PO. Pt states readiness to discharge home. No complaints of pain or nausea. Discharge instructions discussed with patient and patient's cousin, Fanny Ray at his vehicle. Patient discharged to home/self care.  Patient discharged via wheel chair by transporter to waiting family/S.O.       6/9/2023 12:57 PM

## 2023-06-09 NOTE — ANESTHESIA PRE PROCEDURE
Department of Anesthesiology  Preprocedure Note       Name:  Chel Chambers   Age:  77 y.o.  :  1955                                          MRN:  [id number]         Date:  2023      Surgeon: Nakia Gan): Alex Arias MD    Procedure: Procedure(s):  RADIOFREQUENCY IDENTIFIED DEVICE LOCALIZED RIGHT BREAST LUMPECTOMY  . Medications prior to admission:   Prior to Admission medications    Medication Sig Start Date End Date Taking? Authorizing Provider   hydrALAZINE (APRESOLINE) 50 MG tablet Take 1 tablet by mouth 2 times daily 3/7/22   JEVON Valiente CNP   losartan (COZAAR) 100 MG tablet Take 1 tablet by mouth daily 22   JEVON Valiente CNP   VICTOZA 18 MG/3ML SOPN SC injection INJECT 0.6MG MG SUBCUTANEOUSLY ONCE DAILY 21   Historical Provider, MD   amLODIPine (NORVASC) 10 MG tablet Take 1 tablet by mouth daily 21   JEVON Valiente CNP   zolpidem (AMBIEN) 10 MG tablet Take 1 tablet by mouth nightly. 3/9/21   Historical Provider, MD   acetaminophen (TYLENOL) 325 MG tablet Take 1 tablet by mouth as needed 20   Historical Provider, MD   DULoxetine (CYMBALTA) 20 MG extended release capsule Take 1 capsule by mouth daily    Historical Provider, MD   loperamide (RA ANTI-DIARRHEAL) 2 MG capsule Take 1 capsule by mouth 4 times daily as needed for Diarrhea 16   Rosalba Erazo MD   Multiple Vitamins-Minerals (MULTIVITAMIN PO) Take by mouth    Historical Provider, MD   LORazepam (ATIVAN) 0.5 MG tablet Take 1 tablet by mouth every 12 hours as needed  Patient taking differently: Take 1 tablet by mouth 2 times daily.  3/31/16   Juan Carlos Zimmer MD   lurasidone (LATUDA) 40 MG TABS tablet Take 1 tablet by mouth daily 3/31/16   Juan Carlos Zimmer MD   famotidine (PEPCID) 20 MG tablet Take 1 tablet by mouth 2 times daily 3/22/16   JEVON Yost CNP   atorvastatin (LIPITOR) 40 MG tablet Take 1 tablet by mouth daily    Historical Provider, MD   albuterol

## 2023-06-09 NOTE — PROGRESS NOTES
Patient to pacu 10 s/p RADIOFREQUENCY IDENTIFIED DEVICE LOCALIZED RIGHT BREAST LUMPECTOMY - Right  Monitor Anesthesia Care, report received from CRNA, reported hemodynamically stable intra op, all vitals stable upon arrival. .  Pt tolerating Ice chips at this time

## 2023-06-09 NOTE — PROGRESS NOTES
PACU Transfer to [facility]    Vitals:    06/09/23 1200   BP: (!) 119/57   Pulse: 62   Resp: 19   Temp: 97 °F (36.1 °C)   SpO2: 95%         Intake/Output Summary (Last 24 hours) at 6/9/2023 1215  Last data filed at 6/9/2023 1103  Gross per 24 hour   Intake 550 ml   Output --   Net 550 ml       Pain assessment:  present - adequately treated  Pain Level: 0    Patient transferred to care of [facility] RN.  Per Same day RN, they will call family to come when patient is almost ready for d/c.     6/9/2023 12:15 PM

## 2023-06-09 NOTE — ANESTHESIA POSTPROCEDURE EVALUATION
Department of Anesthesiology  Postprocedure Note    Patient: Shiraz Dasilva  MRN: [id number]  YOB: 1955  Date of evaluation: 6/9/2023      Procedure Summary     Date: 06/09/23 Room / Location: [address] [place] [address]    Anesthesia Start: 1012 Anesthesia Stop: 1139    Procedures:       RADIOFREQUENCY IDENTIFIED DEVICE LOCALIZED RIGHT BREAST LUMPECTOMY (Right)      . Diagnosis:       Malignant neoplasm of upper-outer quadrant of right female breast, unspecified estrogen receptor status ([address]      (Malignant neoplasm of upper-outer quadrant of right female breast, unspecified estrogen receptor status ([address] Yevgeniy Hart)    Surgeons: Vida Barrow MD Responsible Provider: Vida Barrow MD    Anesthesia Type: MAC ASA Status: 2          Anesthesia Type: No value filed.     Glenis Phase I: Glenis Score: 9    Glenis Phase II:        Anesthesia Post Evaluation    Patient location during evaluation: PACU  Level of consciousness: awake  Complications: no  Multimodal analgesia pain management approach

## 2023-06-09 NOTE — H&P
Update History & Physical    The patient's History and Physical of June 5, 2023 was reviewed with the patient and I examined the patient. There was no change. The surgical site was confirmed by the patient and me. Plan: The risks, benefits, expected outcome, and alternative to the recommended procedure have been discussed with the patient. Patient understands and wants to proceed with the procedure.      Electronically signed by Osbaldo Yates MD on 6/9/2023 at 9:35 AM

## 2023-06-09 NOTE — OP NOTE
procedure well. She was taken to the  recovery room in stable condition.     Electronically signed by Mauricio Borjas MD on 6/9/2023 at 11:09 AM        Electronically signed by Mauricio Borjas MD on 6/9/2023 at 11:08 AM

## 2023-06-27 ENCOUNTER — OFFICE VISIT (OUTPATIENT)
Dept: SURGERY | Age: 68
End: 2023-06-27

## 2023-06-27 VITALS
OXYGEN SATURATION: 91 % | SYSTOLIC BLOOD PRESSURE: 117 MMHG | WEIGHT: 174 LBS | HEIGHT: 59 IN | RESPIRATION RATE: 18 BRPM | HEART RATE: 64 BPM | DIASTOLIC BLOOD PRESSURE: 80 MMHG | BODY MASS INDEX: 35.08 KG/M2

## 2023-06-27 DIAGNOSIS — C50.411 MALIGNANT NEOPLASM OF UPPER-OUTER QUADRANT OF RIGHT BREAST IN FEMALE, ESTROGEN RECEPTOR POSITIVE (HCC): Primary | ICD-10-CM

## 2023-06-27 DIAGNOSIS — Z17.0 MALIGNANT NEOPLASM OF UPPER-OUTER QUADRANT OF RIGHT BREAST IN FEMALE, ESTROGEN RECEPTOR POSITIVE (HCC): Primary | ICD-10-CM

## 2023-06-27 DIAGNOSIS — Z90.11 S/P PARTIAL MASTECTOMY, RIGHT: ICD-10-CM

## 2023-06-27 PROCEDURE — 99024 POSTOP FOLLOW-UP VISIT: CPT | Performed by: SURGERY

## 2023-09-08 ENCOUNTER — HOSPITAL ENCOUNTER (OUTPATIENT)
Dept: GENERAL RADIOLOGY | Age: 68
Discharge: HOME | End: 2023-09-08
Attending: INTERNAL MEDICINE
Payer: COMMERCIAL

## 2023-09-08 DIAGNOSIS — Z79.811 USE OF AROMATASE INHIBITORS: ICD-10-CM

## 2023-09-08 PROCEDURE — 77080 DXA BONE DENSITY AXIAL: CPT

## 2023-11-14 ENCOUNTER — OFFICE VISIT (OUTPATIENT)
Dept: SURGERY | Age: 68
End: 2023-11-14
Payer: COMMERCIAL

## 2023-11-14 VITALS
RESPIRATION RATE: 18 BRPM | HEIGHT: 59 IN | OXYGEN SATURATION: 98 % | BODY MASS INDEX: 35.28 KG/M2 | WEIGHT: 175 LBS | DIASTOLIC BLOOD PRESSURE: 80 MMHG | SYSTOLIC BLOOD PRESSURE: 135 MMHG | HEART RATE: 72 BPM

## 2023-11-14 DIAGNOSIS — Z80.3 FAMILY HISTORY OF BREAST CANCER: ICD-10-CM

## 2023-11-14 DIAGNOSIS — C50.411 MALIGNANT NEOPLASM OF UPPER-OUTER QUADRANT OF RIGHT BREAST IN FEMALE, ESTROGEN RECEPTOR POSITIVE (HCC): Primary | ICD-10-CM

## 2023-11-14 DIAGNOSIS — Z17.0 MALIGNANT NEOPLASM OF UPPER-OUTER QUADRANT OF RIGHT BREAST IN FEMALE, ESTROGEN RECEPTOR POSITIVE (HCC): Primary | ICD-10-CM

## 2023-11-14 DIAGNOSIS — Z90.11 S/P PARTIAL MASTECTOMY, RIGHT: ICD-10-CM

## 2023-11-14 PROCEDURE — G8484 FLU IMMUNIZE NO ADMIN: HCPCS | Performed by: SURGERY

## 2023-11-14 PROCEDURE — 99213 OFFICE O/P EST LOW 20 MIN: CPT | Performed by: SURGERY

## 2023-11-14 PROCEDURE — G8399 PT W/DXA RESULTS DOCUMENT: HCPCS | Performed by: SURGERY

## 2023-11-14 PROCEDURE — 1036F TOBACCO NON-USER: CPT | Performed by: SURGERY

## 2023-11-14 PROCEDURE — 1123F ACP DISCUSS/DSCN MKR DOCD: CPT | Performed by: SURGERY

## 2023-11-14 PROCEDURE — G8427 DOCREV CUR MEDS BY ELIG CLIN: HCPCS | Performed by: SURGERY

## 2023-11-14 PROCEDURE — G8417 CALC BMI ABV UP PARAM F/U: HCPCS | Performed by: SURGERY

## 2023-11-14 PROCEDURE — 3017F COLORECTAL CA SCREEN DOC REV: CPT | Performed by: SURGERY

## 2023-11-14 PROCEDURE — 1090F PRES/ABSN URINE INCON ASSESS: CPT | Performed by: SURGERY

## 2023-11-14 RX ORDER — LETROZOLE 2.5 MG/1
2.5 TABLET, FILM COATED ORAL DAILY
COMMUNITY

## 2023-11-14 NOTE — PATIENT INSTRUCTIONS
Breast exam performed- right breast redness and tenderness noted  -Discussed the importance of wearing a supportive bra to help with swelling and pain    Healthy Lifestyle Recommendations: healthy diet (decrease consumption of red meat, increase fresh fruits and vegetables), decreased alcohol consumption (less than 4 drinks/week), adequate sleep (goal 6-8 hours), routine exercise (goal 150 minutes/week or greater), weight control.    Continue self breast exams    Plan: Return in April for bilateral diagnostic mammogram and breast check

## 2023-11-14 NOTE — PROGRESS NOTES
notes, test results, and face to face with the patient discussing the diagnosis and importance of compliance with the treatment plan as well as documenting on the day of the visit 11/14/2023   An  electronic signature was used to authenticate this note.     --Marialuisa Herrera MD on 11/14/2023 at 10:18 AM

## 2024-02-15 ENCOUNTER — TELEPHONE (OUTPATIENT)
Dept: SURGERY | Age: 69
End: 2024-02-15

## 2024-02-15 NOTE — TELEPHONE ENCOUNTER
Patient called in wanting to know when she is due to have another colonoscopy     Please contact the patient at [phone]

## 2024-02-15 NOTE — TELEPHONE ENCOUNTER
Spoke to patient, Dr. Peña did a flex sig for patient but has not done a colonoscopy. It appears that last colonoscopy on file was in 2016 with Dr. Ervin. I gave her the phone number for Dr. Ervin and instructed her to call to see when her last colonoscopy was. If she has any questions or needs assistance with anything else she can call our office.

## 2024-04-30 ENCOUNTER — HOSPITAL ENCOUNTER (OUTPATIENT)
Dept: MAMMOGRAPHY | Age: 69
Discharge: HOME | End: 2024-04-30
Payer: COMMERCIAL

## 2024-04-30 ENCOUNTER — OFFICE VISIT (OUTPATIENT)
Dept: SURGERY | Age: 69
End: 2024-04-30
Payer: COMMERCIAL

## 2024-04-30 VITALS
BODY MASS INDEX: 34.13 KG/M2 | HEIGHT: 59 IN | RESPIRATION RATE: 15 BRPM | OXYGEN SATURATION: 97 % | DIASTOLIC BLOOD PRESSURE: 74 MMHG | HEART RATE: 68 BPM | SYSTOLIC BLOOD PRESSURE: 116 MMHG

## 2024-04-30 VITALS — WEIGHT: 169 LBS | BODY MASS INDEX: 34.07 KG/M2 | HEIGHT: 59 IN

## 2024-04-30 DIAGNOSIS — Z17.0 MALIGNANT NEOPLASM OF UPPER-OUTER QUADRANT OF RIGHT BREAST IN FEMALE, ESTROGEN RECEPTOR POSITIVE (HCC): ICD-10-CM

## 2024-04-30 DIAGNOSIS — Z17.0 MALIGNANT NEOPLASM OF UPPER-OUTER QUADRANT OF RIGHT BREAST IN FEMALE, ESTROGEN RECEPTOR POSITIVE (HCC): Primary | ICD-10-CM

## 2024-04-30 DIAGNOSIS — Z90.11 S/P PARTIAL MASTECTOMY, RIGHT: ICD-10-CM

## 2024-04-30 DIAGNOSIS — I89.0 LYMPHEDEMA OF BREAST: ICD-10-CM

## 2024-04-30 DIAGNOSIS — Z80.3 FAMILY HISTORY OF BREAST CANCER: ICD-10-CM

## 2024-04-30 DIAGNOSIS — Z12.31 VISIT FOR SCREENING MAMMOGRAM: ICD-10-CM

## 2024-04-30 DIAGNOSIS — C50.411 MALIGNANT NEOPLASM OF UPPER-OUTER QUADRANT OF RIGHT BREAST IN FEMALE, ESTROGEN RECEPTOR POSITIVE (HCC): ICD-10-CM

## 2024-04-30 DIAGNOSIS — C50.411 MALIGNANT NEOPLASM OF UPPER-OUTER QUADRANT OF RIGHT BREAST IN FEMALE, ESTROGEN RECEPTOR POSITIVE (HCC): Primary | ICD-10-CM

## 2024-04-30 PROCEDURE — 1036F TOBACCO NON-USER: CPT | Performed by: SURGERY

## 2024-04-30 PROCEDURE — 1090F PRES/ABSN URINE INCON ASSESS: CPT | Performed by: SURGERY

## 2024-04-30 PROCEDURE — G8427 DOCREV CUR MEDS BY ELIG CLIN: HCPCS | Performed by: SURGERY

## 2024-04-30 PROCEDURE — 99213 OFFICE O/P EST LOW 20 MIN: CPT | Performed by: SURGERY

## 2024-04-30 PROCEDURE — G0279 TOMOSYNTHESIS, MAMMO: HCPCS

## 2024-04-30 PROCEDURE — 3017F COLORECTAL CA SCREEN DOC REV: CPT | Performed by: SURGERY

## 2024-04-30 PROCEDURE — 1123F ACP DISCUSS/DSCN MKR DOCD: CPT | Performed by: SURGERY

## 2024-04-30 PROCEDURE — G8417 CALC BMI ABV UP PARAM F/U: HCPCS | Performed by: SURGERY

## 2024-04-30 PROCEDURE — G8399 PT W/DXA RESULTS DOCUMENT: HCPCS | Performed by: SURGERY

## 2024-04-30 NOTE — PROGRESS NOTES
4/30/2024    Chief Complaint   Patient presents with    6 Month Follow-Up     6m f/u- right breast feels larger and heavier        HPI Patient is s/p right partial mastectomy for grade 2 T1bNx ductal carcinoma, ER/PA positive, Her2 negative. Postop radiation, finished 8/2023. Taking letrozole, tolerating well except for some loose stools. She has not felt any breast masses, no nipple discharge. She has been having some right central breast pain since radiation.     Bilateral diagnostic mammogram today BIRADS 2B. Post treatment changes noted in the right breast.       Current Outpatient Medications:     letrozole (FEMARA) 2.5 MG tablet, Take 1 tablet by mouth daily, Disp: , Rfl:     hydrALAZINE (APRESOLINE) 50 MG tablet, Take 1 tablet by mouth 2 times daily, Disp: 180 tablet, Rfl: 3    losartan (COZAAR) 100 MG tablet, Take 1 tablet by mouth daily, Disp: 90 tablet, Rfl: 3    VICTOZA 18 MG/3ML SOPN SC injection, INJECT 0.6MG MG SUBCUTANEOUSLY ONCE DAILY, Disp: , Rfl:     amLODIPine (NORVASC) 10 MG tablet, Take 1 tablet by mouth daily, Disp: 90 tablet, Rfl: 3    zolpidem (AMBIEN) 10 MG tablet, Take 1 tablet by mouth nightly., Disp: , Rfl:     acetaminophen (TYLENOL) 325 MG tablet, Take 1 tablet by mouth as needed, Disp: , Rfl:     DULoxetine (CYMBALTA) 20 MG extended release capsule, Take 1 capsule by mouth daily, Disp: , Rfl:     loperamide (RA ANTI-DIARRHEAL) 2 MG capsule, Take 1 capsule by mouth 4 times daily as needed for Diarrhea, Disp: 90 capsule, Rfl: 5    Multiple Vitamins-Minerals (MULTIVITAMIN PO), Take by mouth, Disp: , Rfl:     LORazepam (ATIVAN) 0.5 MG tablet, Take 1 tablet by mouth every 12 hours as needed (Patient taking differently: Take 1 tablet by mouth 2 times daily.), Disp: 60 tablet, Rfl: 0    lurasidone (LATUDA) 40 MG TABS tablet, Take 1 tablet by mouth daily, Disp: 30 tablet, Rfl: 0    famotidine (PEPCID) 20 MG tablet, Take 1 tablet by mouth 2 times daily, Disp: 60 tablet, Rfl: 3

## 2024-04-30 NOTE — PATIENT INSTRUCTIONS
Mammogram reviewed, no concerning findings  Breast exam performed, no palpable masses.    Continue self breast exams    Healthy Lifestyle Recommendations: healthy diet (decrease consumption of red meat, increase fresh fruits and vegetables), decreased alcohol consumption (less than 4 drinks/week), adequate sleep (goal 6-8 hours), routine exercise (goal 150 minutes/week or greater), weight control.     Return: 1yr with bilateral screening mammo and breast check    Refer to PT for right breast swelling

## 2024-05-31 ENCOUNTER — HOSPITAL ENCOUNTER (OUTPATIENT)
Dept: PHYSICAL THERAPY | Age: 69
Setting detail: THERAPIES SERIES
Discharge: HOME | End: 2024-05-31
Attending: SURGERY
Payer: COMMERCIAL

## 2024-05-31 DIAGNOSIS — I89.0 LYMPHEDEMA: Primary | ICD-10-CM

## 2024-05-31 PROCEDURE — 97530 THERAPEUTIC ACTIVITIES: CPT

## 2024-05-31 PROCEDURE — 97110 THERAPEUTIC EXERCISES: CPT

## 2024-05-31 PROCEDURE — 97140 MANUAL THERAPY 1/> REGIONS: CPT

## 2024-05-31 PROCEDURE — 97161 PT EVAL LOW COMPLEX 20 MIN: CPT

## 2024-05-31 NOTE — PLAN OF CARE
HEP    Plan: POC initiated as per evaluation    Electronically Signed by Amara Fam PT, DPT [id number]  Date: 05/31/2024     Note: Portions of this note have been templated and/or copied from initial evaluation, reassessments and prior notes for documentation efficiency.    Note: If patient does not return for scheduled/recommended follow up visits, this note will serve as a discharge from care along with the most recent update on progress.

## 2024-06-06 ENCOUNTER — OFFICE VISIT (OUTPATIENT)
Dept: SURGERY | Age: 69
End: 2024-06-06
Payer: COMMERCIAL

## 2024-06-06 VITALS
DIASTOLIC BLOOD PRESSURE: 67 MMHG | BODY MASS INDEX: 34.94 KG/M2 | TEMPERATURE: 98.1 F | SYSTOLIC BLOOD PRESSURE: 117 MMHG | HEART RATE: 61 BPM | OXYGEN SATURATION: 97 % | WEIGHT: 173 LBS

## 2024-06-06 DIAGNOSIS — K62.3 RECTAL MUCOSA PROLAPSE: Primary | ICD-10-CM

## 2024-06-06 PROCEDURE — 1090F PRES/ABSN URINE INCON ASSESS: CPT | Performed by: SURGERY

## 2024-06-06 PROCEDURE — G8417 CALC BMI ABV UP PARAM F/U: HCPCS | Performed by: SURGERY

## 2024-06-06 PROCEDURE — 99213 OFFICE O/P EST LOW 20 MIN: CPT | Performed by: SURGERY

## 2024-06-06 PROCEDURE — G8399 PT W/DXA RESULTS DOCUMENT: HCPCS | Performed by: SURGERY

## 2024-06-06 PROCEDURE — 1036F TOBACCO NON-USER: CPT | Performed by: SURGERY

## 2024-06-06 PROCEDURE — 1123F ACP DISCUSS/DSCN MKR DOCD: CPT | Performed by: SURGERY

## 2024-06-06 PROCEDURE — G8428 CUR MEDS NOT DOCUMENT: HCPCS | Performed by: SURGERY

## 2024-06-06 PROCEDURE — 3017F COLORECTAL CA SCREEN DOC REV: CPT | Performed by: SURGERY

## 2024-06-06 RX ORDER — SEMAGLUTIDE 1.34 MG/ML
INJECTION, SOLUTION SUBCUTANEOUS
COMMUNITY

## 2024-06-06 NOTE — PROGRESS NOTES
Subjective:     Patient is a 69 y.o. woman with rectal mucosal prolapse     HPI: Ms. Garcia reports minor prolapse over the last few weeks. It is not painful and goes back on its own. She does have some issues getting clean     Patient Active Problem List    Diagnosis Date Noted    Malignant neoplasm of upper-outer quadrant of right female breast (HCC) 05/30/2023    Family history of breast cancer 05/30/2023    Ventral hernia without obstruction or gangrene 09/05/2021    S/P hernia repair 09/04/2021    Ventral incisional hernia 09/01/2021    Rectal prolapse 04/26/2019    Encephalopathy acute 10/23/2016    H/O ileostomy 09/20/2016    Type 2 diabetes mellitus without complication, without long-term current use of insulin (HCC) 09/16/2016    Debility 03/23/2016    Respiratory failure following trauma and surgery (HCC) 03/06/2016    Ischemic bowel disease (HCC) 03/06/2016    Obesity 03/06/2016    Colonic obstruction (HCC) 03/03/2016     Past Medical History:   Diagnosis Date    Anxiety, generalized     Breast cancer (HCC) 2023    right breast    Cancer (HCC)     BREAST    CPAP (continuous positive airway pressure) dependence     Diabetes mellitus (HCC)     no meds    Diverticulitis     H/O ischemic bowel disease 03/2016    Hepatitis C     History of blood transfusion     s/p hysterectomy    History of kidney stones     x 1 episode    History of therapeutic radiation     Hyperlipidemia     LBBB (left bundle branch block)     Loose stools     secondary to history of colectomy    Paranoid schizophrenia (HCC)     Proteinuria     history of    Rectal prolapse     Retention of urine     Sciatica     Sleep apnea     USES CPAP    Sleep apnea     Wears glasses     Wears partial dentures     Wears partial dentures       Past Surgical History:   Procedure Laterality Date    ABDOMEN SURGERY  09/20/2016    OPEN ILEOSTOMY TAKEDOWN                   BREAST SURGERY Right 6/9/2023    RADIOFREQUENCY IDENTIFIED DEVICE LOCALIZED RIGHT

## 2024-06-24 ENCOUNTER — HOSPITAL ENCOUNTER (OUTPATIENT)
Dept: PHYSICAL THERAPY | Age: 69
Setting detail: THERAPIES SERIES
Discharge: HOME | End: 2024-06-24
Attending: SURGERY
Payer: COMMERCIAL

## 2024-06-24 PROCEDURE — 97110 THERAPEUTIC EXERCISES: CPT

## 2024-06-24 PROCEDURE — 97140 MANUAL THERAPY 1/> REGIONS: CPT

## 2024-06-24 NOTE — PLAN OF CARE
[facility] [address], [place] [address] office: [phone] fax: [phone]       Physical Therapy: TREATMENT/PROGRESS NOTE   Patient: Cherelle Oconnor (69 y.o. female)   Examination Date: 2024   :  1955 MRN: [id number]   Visit #:    Insurance Allowable Auth Needed   12 visits then auth []Yes    [x]No    Insurance: Payor: [employer] [place] / Plan: [employer] [place] DUAL / Product Type: *No Product type* /   Insurance ID: [id number] - (Medicare Managed)  Secondary Insurance (if applicable):    Treatment Diagnosis:     ICD-10-CM    1. Lymphedema  I89.0          Medical Diagnosis:  Lymphedema of breast [I89.0]   Referring Physician: Cristal Dyer MD  PCP: Nirali Moon APRN - CNP     Plan of care signed (Y/N): Y    Date of Patient follow up with Physician: ?     Progress Report/POC: YES, Date Range for this report: 2024 to 2024  Progress note update due: (10 visits /OR AUTH LIMITS, whichever is less)  2024   Recert: 2024                                            Precautions/ Contra-indications:           Latex allergy:  NO  Pacemaker:    NO  Contraindications for Manipulation: NA  Date of Surgery: 2023  Other: Hx R breast CA, DM, HTN, OA, anxiety, Hep C, LBBB, paranoid schizophrenia (not recently), B TKA, Hx ischemic bowel disease with colectomy, neuropathy    SUBJECTIVE EXAMINATION     Patient stated complaint/comment: Pt reports that her R breast is feeling softer and less swollen - started noticing about a week or so ago.  Also feel less heaviness to R breast.  Has been wearing a bra consistently t/o the day.  Has been getting in HEP twice a day.               Test used Initial score  2024   Pain Summary VAS 0/10 2/10 comes and goes   Functional questionnaire LLIS 0 = 0% impaired (only first page filled out)    Other:                 OBJECTIVE EXAMINATION     Only

## 2024-07-18 NOTE — DISCHARGE INSTRUCTIONS
1.  Diet:  Regular diet as tolerated. 2.  Activity:  No lifting greater than 15 pounds for 3-4 days. Then activity as tolerated per comfort. 3.  Incision care: May shower over incision glue starting tomorrow. 4.  No lotion to surgical glue. 5.  Please call for follow-up appointment for 2 weeks [phone].   6.  Medications:  Continue your home medications. Can take Tylenol or ibuprofen or prescription pain medication. 7.  Call for temperature greater than 101 degrees F or for excessive bleeding or pain or swelling or inability to void. 8.  May take over the counter laxative or stool softeners as needed. 9.  No swimming for 2 weeks. 10. Wear bra day and night for 1 week per comfort. Office Phone Number:  [phone]      [address] [place] [address]    There are potential side effects of anesthesia or sedation you may experience for the first 24 hours. These side effects include:    Confusion or Memory loss, Dizziness, or Delayed Reaction Times   [x]A responsible person should be with you for the next 24 hours. Do not operate any vehicles (automobiles, bicycles, motorcycles) or power tools or machinery for 24 hours. Do not sign any legal documents or make any legal decisions for 24 hours. Do not drink alcohol for 24 hours or while taking narcotic pain medication. Nausea    [x]Start with light diet and progress to your normal diet as you feel like eating. However, if you experience nausea or repeated episodes of vomiting which persist beyond 12-24 hours, notify your physician. Once nausea has passed, remember to keep drinking fluids. Difficulty Passing Urine  [x]Drink extra amounts of fluid today. Notify your physician if you have not urinated within 8 hours after your procedure or you feel uncomfortable. Irritated Throat from a Breathing Tube  [x]Drink extra amounts of fluid today. Lozenges may help.     Muscle Aches  [x]You may experience some You can access the FollowMyHealth Patient Portal offered by [facility] by registering at the following website: [email or web address]. By joining [facility]’s FollowMyHealth portal, you will also be able to view your health information using other applications (apps) compatible with our system.

## 2024-08-30 ENCOUNTER — TELEPHONE (OUTPATIENT)
Dept: SURGERY | Age: 69
End: 2024-08-30

## 2024-08-30 NOTE — TELEPHONE ENCOUNTER
ANITAM for pt to call office to [facility] on 9/12 to 9/16, due to  being in surgery.

## 2024-09-16 ENCOUNTER — PREP FOR PROCEDURE (OUTPATIENT)
Dept: SURGERY | Age: 69
End: 2024-09-16

## 2024-09-16 ENCOUNTER — OFFICE VISIT (OUTPATIENT)
Dept: SURGERY | Age: 69
End: 2024-09-16
Payer: COMMERCIAL

## 2024-09-16 VITALS
HEART RATE: 72 BPM | DIASTOLIC BLOOD PRESSURE: 68 MMHG | BODY MASS INDEX: 34.27 KG/M2 | WEIGHT: 170 LBS | OXYGEN SATURATION: 97 % | SYSTOLIC BLOOD PRESSURE: 112 MMHG | TEMPERATURE: 97.8 F | HEIGHT: 59 IN

## 2024-09-16 DIAGNOSIS — K62.3 RECTAL MUCOSA PROLAPSE: ICD-10-CM

## 2024-09-16 DIAGNOSIS — K62.3 RECTAL MUCOSA PROLAPSE: Primary | ICD-10-CM

## 2024-09-16 PROCEDURE — 1090F PRES/ABSN URINE INCON ASSESS: CPT | Performed by: SURGERY

## 2024-09-16 PROCEDURE — 1123F ACP DISCUSS/DSCN MKR DOCD: CPT | Performed by: SURGERY

## 2024-09-16 PROCEDURE — 1036F TOBACCO NON-USER: CPT | Performed by: SURGERY

## 2024-09-16 PROCEDURE — 3017F COLORECTAL CA SCREEN DOC REV: CPT | Performed by: SURGERY

## 2024-09-16 PROCEDURE — G8417 CALC BMI ABV UP PARAM F/U: HCPCS | Performed by: SURGERY

## 2024-09-16 PROCEDURE — G8427 DOCREV CUR MEDS BY ELIG CLIN: HCPCS | Performed by: SURGERY

## 2024-09-16 PROCEDURE — G8399 PT W/DXA RESULTS DOCUMENT: HCPCS | Performed by: SURGERY

## 2024-09-16 PROCEDURE — 99214 OFFICE O/P EST MOD 30 MIN: CPT | Performed by: SURGERY

## 2024-09-16 RX ORDER — SODIUM CHLORIDE 9 MG/ML
INJECTION, SOLUTION INTRAVENOUS PRN
Status: CANCELLED | OUTPATIENT
Start: 2024-10-22

## 2024-09-16 RX ORDER — SODIUM CHLORIDE 0.9 % (FLUSH) 0.9 %
5-40 SYRINGE (ML) INJECTION PRN
Status: CANCELLED | OUTPATIENT
Start: 2024-10-22

## 2024-09-16 RX ORDER — METRONIDAZOLE 500 MG/100ML
500 INJECTION, SOLUTION INTRAVENOUS
Status: CANCELLED | OUTPATIENT
Start: 2024-10-22 | End: 2024-10-22

## 2024-09-16 RX ORDER — SODIUM CHLORIDE 9 MG/ML
INJECTION, SOLUTION INTRAVENOUS CONTINUOUS
Status: CANCELLED | OUTPATIENT
Start: 2024-10-22

## 2024-09-16 RX ORDER — SODIUM CHLORIDE 0.9 % (FLUSH) 0.9 %
5-40 SYRINGE (ML) INJECTION EVERY 12 HOURS SCHEDULED
Status: CANCELLED | OUTPATIENT
Start: 2024-10-22

## 2024-09-18 ENCOUNTER — TELEPHONE (OUTPATIENT)
Dept: SURGERY | Age: 69
End: 2024-09-18

## 2024-09-26 PROBLEM — K62.3 RECTAL MUCOSA PROLAPSE: Status: ACTIVE | Noted: 2024-09-16

## 2024-09-26 RX ORDER — NEOMYCIN SULFATE 500 MG/1
TABLET ORAL
Qty: 6 TABLET | Refills: 0 | Status: SHIPPED | OUTPATIENT
Start: 2024-09-26

## 2024-09-26 RX ORDER — METRONIDAZOLE 500 MG/1
TABLET ORAL
Qty: 3 TABLET | Refills: 0 | Status: SHIPPED | OUTPATIENT
Start: 2024-09-26

## 2024-09-26 RX ORDER — ONDANSETRON 8 MG/1
8 TABLET, ORALLY DISINTEGRATING ORAL EVERY 8 HOURS PRN
Qty: 3 TABLET | Refills: 0 | Status: SHIPPED | OUTPATIENT
Start: 2024-09-26

## 2024-10-03 ENCOUNTER — TELEPHONE (OUTPATIENT)
Dept: SURGERY | Age: 69
End: 2024-10-03

## 2024-10-03 NOTE — TELEPHONE ENCOUNTER
LM returning call about prep instructions. Advised patient she is to be on a clear liquid diet entire day. 12 noon is when she takes 2 tablets of dulcolax and will start taking miralax at 3.

## 2024-10-03 NOTE — TELEPHONE ENCOUNTER
EXAM UNDER ANETHESIA, POSSIBLE HEMORRHOID BANDING, POSSIBLE DELORME OPERATION [[id number]]  Is scheduled to be completed on 10/22/2024.     Patient states she understands the instructions on taking the antibiotics. She purchased the miraLAX. She would like to know if she is to also take the dulcolax tablets and if so what time.     Patient states she completed her stress test on 09/30/2024 with Dr. Larson. She is scheduled to see her PCP on 10/17/2024 for surgery clearance and the report will be faxed to the office the same date.     Patient can be reached at [phone].

## 2024-10-15 ENCOUNTER — TELEPHONE (OUTPATIENT)
Dept: SURGERY | Age: 69
End: 2024-10-15

## 2024-10-15 NOTE — TELEPHONE ENCOUNTER
EXAM UNDER ANETHESIA, POSSIBLE HEMORRHOID BANDING, POSSIBLE DELORME OPERATION [[id number]] is scheduled to be completed on 10/22/2024.    Patient would like to clarify which of her medications she is fine to take and which medications she is to stop. She's already been instructed  to stop the Insulin but just needs advice on other medications.     Patient also states her stress test was completed on 09/30/2024 and the report was faxed to the office. Patient states she is scheduled to see her PCP on 10/17/2024 and that report will be faxed to the office as well.     Patient can be reached at [phone].

## 2024-10-16 NOTE — TELEPHONE ENCOUNTER
Spoke with patient ok to take blood pressure meds with a sip of water wait to take everything else.

## 2024-10-17 RX ORDER — SEMAGLUTIDE 0.68 MG/ML
0.5 INJECTION, SOLUTION SUBCUTANEOUS WEEKLY
COMMUNITY
Start: 2024-09-10

## 2024-10-17 NOTE — PROGRESS NOTES
10/17/2024 1314 PM:      [facility] PRE-SURGICAL TESTING INSTRUCTIONS                      PRIOR TO PROCEDURE DATE:    1. PLEASE FOLLOW ANY INSTRUCTIONS GIVEN TO YOU PER YOUR SURGEON.      2. Arrange for someone to drive you home and be with you for the first 24 hours after discharge for your safety after your procedure for which you received sedation. Ensure it is someone we can share information with regarding your discharge.     NOTE: At this time ONLY 2 ADULTS may accompany you. NO CHILDREN UNDER AGE OF 16.    One person ENCOURAGED to stay at hospital entire time if outpatient surgery      3. You must contact your surgeon for instructions IF:  You are taking any blood thinners, aspirin, anti-inflammatory or vitamins.   Contact your ordering physician/surgeon for prescription medication instructions as soon as possible, especially if taking blood thinners, aspirin, heart, or diabetic medication.   There is a change in your physical condition such as a cold, fever, rash, cuts, sores, or any other infection, especially near your surgical site.    4. Do not drink alcohol the day before or day of your procedure.  Do not use any recreational marijuana at least 24 hours or street drugs (heroin, cocaine) at minimum 5 days prior to your procedure.     5. A Pre-Surgical History and Physical MUST be completed WITHIN 30 DAYS OR LESS prior to your procedure.by your Physician or an Urgent Care        THE DAY OF YOUR PROCEDURE:  1.  Follow instructions for ARRIVAL TIME as DIRECTED BY YOUR SURGEON. [place] [phone]     2. Enter the MAIN entrance from [facility] and follow the signs to the free Parking Garage or  Parking (offered free of charge 7 am-5pm).      3. Enter the Main Entrance of the hospital (do not enter from the lower level of the parking garage). Upon entrance, check in with the  at the surgical information desk on your LEFT.   Bring your

## 2024-10-17 NOTE — H&P (VIEW-ONLY)
interest or pleasure in doing things Not at all   Feeling down, depressed or hopeless [include irritable if under 18] Not at all   PHQ2 Score 0   Little interest or pleasure in doing things Not at all   Feeling down, depressed or hopeless [include irritable if under 18] Not at all   Trouble falling or staying asleep, or sleeping too much Not at all   Feeling tired or having little energy Not at all   Poor appetite or overeating Not at all   Feeling bad about yourself - or that you are a failure or have let yourself or your family down Not at all   Trouble concentrating on things like school work, reading or watching TV? Not at all   Moving or speaking so slowly that other people could have noticed? Or the opposite - being so fidgety or restless that you have been moving around a lot more than usual Not at all   Thoughts you would be better off dead or of hurting yourself in some way Not at all   If you checked off any problems, how difficult have these problems made it for you to do your work, take care of things at home, or get along with other people? Not difficult at all   PHQ-9 Total Score (Auto Calculated) 0   Depression Severity: None-minimal       Audit Score = AUDIT Total Score (Auto Calculated) 0  at 1/19/2024  8:25 AM     DAST Score = DAST Total Score 0  at 7/18/2014  9:00 AMDAST-10 Total Score (Auto Calculated) 0  at 1/19/2024  8:25 AMDAST Total Score (Manual Entry)  0  at 5/4/2018  8:00 AM     No data recorded    Weight management  Body mass index is 33.73 kg/m².    Recommended testing:  Health Maintenance   Topic Date Due   • Annual Wellness Visit (Medicare)  Never done   • Imm-Hepatitis A (2 of 2 - Risk 2-dose series) 07/19/2024   • Diabetes Microalbumin (w/Creatinine)  10/04/2024   • Diabetes Foot Exam  01/15/2025   • Diabetes HbA1c  01/19/2025   • Falls Prevention  01/19/2025   • Lipid Screening  03/15/2025   • Serum Creatinine  03/15/2025   • Breast Cancer Screening (Mammogram)  04/30/2025   •

## 2024-10-17 NOTE — PROGRESS NOTES
10/17/2024 1241 PM:     PREP FOR PROCEDURE INSTRUCTIONS/TS          10/17/2024 1315 PM:    TI COMPLETED/TS    H&P(NEEDS LUNG AND HEART SOUNDS) IN CE  DONE 10/17/2024 AT [address], STRESS TEST IN [facility]  9/30/2024, LAB RESULTS PENDING FROM [facility] [phone]/TS    [facility] ENTERED FOR DOS/TS

## 2024-10-21 ENCOUNTER — ANESTHESIA EVENT (OUTPATIENT)
Dept: OPERATING ROOM | Age: 69
End: 2024-10-21
Payer: COMMERCIAL

## 2024-10-21 ENCOUNTER — TELEPHONE (OUTPATIENT)
Dept: SURGERY | Age: 69
End: 2024-10-21

## 2024-10-21 NOTE — TELEPHONE ENCOUNTER
Lvm for patient to confirm 10:30 sx time / 8:30 arr time, have started a clear liquid diet, follow prep instructions with antibiotics, NPO after midnight, possible admit after sx, if not  has to be 18 or over to transport patient home.

## 2024-10-22 ENCOUNTER — ANESTHESIA (OUTPATIENT)
Dept: OPERATING ROOM | Age: 69
End: 2024-10-22
Payer: COMMERCIAL

## 2024-10-22 ENCOUNTER — HOSPITAL ENCOUNTER (OUTPATIENT)
Age: 69
Setting detail: SURGERY ADMIT
Discharge: HOME | End: 2024-10-22
Attending: SURGERY | Admitting: SURGERY
Payer: COMMERCIAL

## 2024-10-22 VITALS
RESPIRATION RATE: 14 BRPM | TEMPERATURE: 97.9 F | BODY MASS INDEX: 34.03 KG/M2 | WEIGHT: 168.8 LBS | DIASTOLIC BLOOD PRESSURE: 67 MMHG | HEART RATE: 77 BPM | HEIGHT: 59 IN | OXYGEN SATURATION: 98 % | SYSTOLIC BLOOD PRESSURE: 104 MMHG

## 2024-10-22 DIAGNOSIS — K62.3 RECTAL MUCOSA PROLAPSE: ICD-10-CM

## 2024-10-22 LAB
EKG ATRIAL RATE: 81 BPM
EKG DIAGNOSIS: NORMAL
EKG P AXIS: 66 DEGREES
EKG P-R INTERVAL: 160 MS
EKG Q-T INTERVAL: 412 MS
EKG QRS DURATION: 130 MS
EKG QTC CALCULATION (BAZETT): 478 MS
EKG R AXIS: 37 DEGREES
EKG T AXIS: 92 DEGREES
EKG VENTRICULAR RATE: 81 BPM
GLUCOSE BLD-MCNC: 94 MG/DL (ref 70–99)
PERFORMED ON: NORMAL

## 2024-10-22 PROCEDURE — 3700000001 HC ADD 15 MINUTES (ANESTHESIA): Performed by: SURGERY

## 2024-10-22 PROCEDURE — 2580000003 HC RX 258: Performed by: SURGERY

## 2024-10-22 PROCEDURE — 2500000003 HC RX 250 WO HCPCS: Performed by: NURSE ANESTHETIST, CERTIFIED REGISTERED

## 2024-10-22 PROCEDURE — 6370000000 HC RX 637 (ALT 250 FOR IP): Performed by: ANESTHESIOLOGY

## 2024-10-22 PROCEDURE — 3700000000 HC ANESTHESIA ATTENDED CARE: Performed by: SURGERY

## 2024-10-22 PROCEDURE — 88305 TISSUE EXAM BY PATHOLOGIST: CPT

## 2024-10-22 PROCEDURE — 7100000000 HC PACU RECOVERY - FIRST 15 MIN: Performed by: SURGERY

## 2024-10-22 PROCEDURE — 7100000011 HC PHASE II RECOVERY - ADDTL 15 MIN: Performed by: SURGERY

## 2024-10-22 PROCEDURE — A4217 STERILE WATER/SALINE, 500 ML: HCPCS | Performed by: SURGERY

## 2024-10-22 PROCEDURE — 46606 ANOSCOPY AND BIOPSY: CPT | Performed by: SURGERY

## 2024-10-22 PROCEDURE — 6360000002 HC RX W HCPCS: Performed by: SURGERY

## 2024-10-22 PROCEDURE — 2580000003 HC RX 258: Performed by: NURSE ANESTHETIST, CERTIFIED REGISTERED

## 2024-10-22 PROCEDURE — 6360000002 HC RX W HCPCS: Performed by: NURSE ANESTHETIST, CERTIFIED REGISTERED

## 2024-10-22 PROCEDURE — 46221 LIGATION OF HEMORRHOID(S): CPT | Performed by: SURGERY

## 2024-10-22 PROCEDURE — 3600000004 HC SURGERY LEVEL 4 BASE: Performed by: SURGERY

## 2024-10-22 PROCEDURE — 2709999900 HC NON-CHARGEABLE SUPPLY: Performed by: SURGERY

## 2024-10-22 PROCEDURE — 2580000003 HC RX 258: Performed by: ANESTHESIOLOGY

## 2024-10-22 PROCEDURE — C9290 INJ, BUPIVACAINE LIPOSOME: HCPCS | Performed by: SURGERY

## 2024-10-22 PROCEDURE — 3600000014 HC SURGERY LEVEL 4 ADDTL 15MIN: Performed by: SURGERY

## 2024-10-22 PROCEDURE — 7100000010 HC PHASE II RECOVERY - FIRST 15 MIN: Performed by: SURGERY

## 2024-10-22 PROCEDURE — 7100000001 HC PACU RECOVERY - ADDTL 15 MIN: Performed by: SURGERY

## 2024-10-22 RX ORDER — ACETAMINOPHEN 500 MG
1000 TABLET ORAL ONCE
Status: COMPLETED | OUTPATIENT
Start: 2024-10-22 | End: 2024-10-22

## 2024-10-22 RX ORDER — SODIUM CHLORIDE 0.9 % (FLUSH) 0.9 %
5-40 SYRINGE (ML) INJECTION PRN
Status: DISCONTINUED | OUTPATIENT
Start: 2024-10-22 | End: 2024-10-22 | Stop reason: HOSPADM

## 2024-10-22 RX ORDER — SCOLOPAMINE TRANSDERMAL SYSTEM 1 MG/1
1 PATCH, EXTENDED RELEASE TRANSDERMAL
Status: DISCONTINUED | OUTPATIENT
Start: 2024-10-22 | End: 2024-10-22 | Stop reason: HOSPADM

## 2024-10-22 RX ORDER — MAGNESIUM HYDROXIDE 1200 MG/15ML
LIQUID ORAL CONTINUOUS PRN
Status: DISCONTINUED | OUTPATIENT
Start: 2024-10-22 | End: 2024-10-22 | Stop reason: HOSPADM

## 2024-10-22 RX ORDER — ACETAMINOPHEN 500 MG
1000 TABLET ORAL ONCE
Status: DISCONTINUED | OUTPATIENT
Start: 2024-10-22 | End: 2024-10-22 | Stop reason: SDUPTHER

## 2024-10-22 RX ORDER — ROCURONIUM BROMIDE 10 MG/ML
INJECTION, SOLUTION INTRAVENOUS
Status: DISCONTINUED | OUTPATIENT
Start: 2024-10-22 | End: 2024-10-22 | Stop reason: SDUPTHER

## 2024-10-22 RX ORDER — SODIUM CHLORIDE 9 MG/ML
INJECTION, SOLUTION INTRAVENOUS PRN
Status: DISCONTINUED | OUTPATIENT
Start: 2024-10-22 | End: 2024-10-22 | Stop reason: HOSPADM

## 2024-10-22 RX ORDER — PROPOFOL 10 MG/ML
INJECTION, EMULSION INTRAVENOUS
Status: DISCONTINUED | OUTPATIENT
Start: 2024-10-22 | End: 2024-10-22 | Stop reason: SDUPTHER

## 2024-10-22 RX ORDER — DOCUSATE SODIUM 100 MG/1
100 CAPSULE, LIQUID FILLED ORAL 2 TIMES DAILY
Qty: 30 CAPSULE | Refills: 0 | Status: SHIPPED | OUTPATIENT
Start: 2024-10-22

## 2024-10-22 RX ORDER — SODIUM CHLORIDE 9 MG/ML
INJECTION, SOLUTION INTRAVENOUS CONTINUOUS
Status: DISCONTINUED | OUTPATIENT
Start: 2024-10-22 | End: 2024-10-22 | Stop reason: HOSPADM

## 2024-10-22 RX ORDER — HALOPERIDOL 5 MG/ML
1 INJECTION INTRAMUSCULAR
Status: DISCONTINUED | OUTPATIENT
Start: 2024-10-22 | End: 2024-10-22 | Stop reason: HOSPADM

## 2024-10-22 RX ORDER — SODIUM CHLORIDE 0.9 % (FLUSH) 0.9 %
5-40 SYRINGE (ML) INJECTION EVERY 12 HOURS SCHEDULED
Status: DISCONTINUED | OUTPATIENT
Start: 2024-10-22 | End: 2024-10-22 | Stop reason: HOSPADM

## 2024-10-22 RX ORDER — FENTANYL CITRATE 50 UG/ML
INJECTION, SOLUTION INTRAMUSCULAR; INTRAVENOUS
Status: DISCONTINUED | OUTPATIENT
Start: 2024-10-22 | End: 2024-10-22 | Stop reason: SDUPTHER

## 2024-10-22 RX ORDER — NALOXONE HYDROCHLORIDE 0.4 MG/ML
INJECTION, SOLUTION INTRAMUSCULAR; INTRAVENOUS; SUBCUTANEOUS PRN
Status: DISCONTINUED | OUTPATIENT
Start: 2024-10-22 | End: 2024-10-22 | Stop reason: HOSPADM

## 2024-10-22 RX ORDER — CEFAZOLIN SODIUM 1 G/3ML
INJECTION, POWDER, FOR SOLUTION INTRAMUSCULAR; INTRAVENOUS
Status: DISCONTINUED | OUTPATIENT
Start: 2024-10-22 | End: 2024-10-22 | Stop reason: SDUPTHER

## 2024-10-22 RX ORDER — SODIUM CHLORIDE, SODIUM LACTATE, POTASSIUM CHLORIDE, CALCIUM CHLORIDE 600; 310; 30; 20 MG/100ML; MG/100ML; MG/100ML; MG/100ML
INJECTION, SOLUTION INTRAVENOUS CONTINUOUS
Status: DISCONTINUED | OUTPATIENT
Start: 2024-10-22 | End: 2024-10-22 | Stop reason: HOSPADM

## 2024-10-22 RX ORDER — LIDOCAINE HYDROCHLORIDE 10 MG/ML
1 INJECTION, SOLUTION EPIDURAL; INFILTRATION; INTRACAUDAL; PERINEURAL
Status: DISCONTINUED | OUTPATIENT
Start: 2024-10-22 | End: 2024-10-22 | Stop reason: HOSPADM

## 2024-10-22 RX ORDER — HYDRALAZINE HYDROCHLORIDE 20 MG/ML
10 INJECTION INTRAMUSCULAR; INTRAVENOUS
Status: DISCONTINUED | OUTPATIENT
Start: 2024-10-22 | End: 2024-10-22 | Stop reason: HOSPADM

## 2024-10-22 RX ORDER — METRONIDAZOLE 500 MG/100ML
500 INJECTION, SOLUTION INTRAVENOUS
Status: COMPLETED | OUTPATIENT
Start: 2024-10-22 | End: 2024-10-22

## 2024-10-22 RX ORDER — FENTANYL CITRATE 50 UG/ML
25 INJECTION, SOLUTION INTRAMUSCULAR; INTRAVENOUS EVERY 5 MIN PRN
Status: DISCONTINUED | OUTPATIENT
Start: 2024-10-22 | End: 2024-10-22 | Stop reason: HOSPADM

## 2024-10-22 RX ORDER — OXYCODONE HYDROCHLORIDE 5 MG/1
5 TABLET ORAL EVERY 6 HOURS PRN
Qty: 12 TABLET | Refills: 0 | Status: SHIPPED | OUTPATIENT
Start: 2024-10-22 | End: 2024-10-25

## 2024-10-22 RX ORDER — PROCHLORPERAZINE EDISYLATE 5 MG/ML
5 INJECTION INTRAMUSCULAR; INTRAVENOUS
Status: DISCONTINUED | OUTPATIENT
Start: 2024-10-22 | End: 2024-10-22 | Stop reason: HOSPADM

## 2024-10-22 RX ORDER — HYDROMORPHONE HYDROCHLORIDE 1 MG/ML
0.5 INJECTION, SOLUTION INTRAMUSCULAR; INTRAVENOUS; SUBCUTANEOUS EVERY 5 MIN PRN
Status: DISCONTINUED | OUTPATIENT
Start: 2024-10-22 | End: 2024-10-22 | Stop reason: HOSPADM

## 2024-10-22 RX ORDER — SODIUM CHLORIDE, SODIUM LACTATE, POTASSIUM CHLORIDE, CALCIUM CHLORIDE 600; 310; 30; 20 MG/100ML; MG/100ML; MG/100ML; MG/100ML
INJECTION, SOLUTION INTRAVENOUS
Status: DISCONTINUED | OUTPATIENT
Start: 2024-10-22 | End: 2024-10-22 | Stop reason: SDUPTHER

## 2024-10-22 RX ORDER — LIDOCAINE HYDROCHLORIDE 20 MG/ML
INJECTION, SOLUTION INFILTRATION; PERINEURAL
Status: DISCONTINUED | OUTPATIENT
Start: 2024-10-22 | End: 2024-10-22 | Stop reason: SDUPTHER

## 2024-10-22 RX ADMIN — ROCURONIUM BROMIDE 40 MG: 10 INJECTION, SOLUTION INTRAVENOUS at 10:47

## 2024-10-22 RX ADMIN — LIDOCAINE HYDROCHLORIDE 100 MG: 20 INJECTION, SOLUTION INFILTRATION; PERINEURAL at 10:52

## 2024-10-22 RX ADMIN — CEFAZOLIN SODIUM 2 G: 1 POWDER, FOR SOLUTION INTRAMUSCULAR; INTRAVENOUS at 10:52

## 2024-10-22 RX ADMIN — PROPOFOL 120 MG: 10 INJECTION, EMULSION INTRAVENOUS at 10:47

## 2024-10-22 RX ADMIN — METRONIDAZOLE 500 MG: 500 INJECTION, SOLUTION INTRAVENOUS at 10:47

## 2024-10-22 RX ADMIN — SODIUM CHLORIDE: 9 INJECTION, SOLUTION INTRAVENOUS at 09:21

## 2024-10-22 RX ADMIN — SUGAMMADEX 200 MG: 100 INJECTION, SOLUTION INTRAVENOUS at 11:04

## 2024-10-22 RX ADMIN — SODIUM CHLORIDE, SODIUM LACTATE, POTASSIUM CHLORIDE, AND CALCIUM CHLORIDE: .6; .31; .03; .02 INJECTION, SOLUTION INTRAVENOUS at 10:30

## 2024-10-22 RX ADMIN — ACETAMINOPHEN 1000 MG: 500 TABLET ORAL at 09:21

## 2024-10-22 RX ADMIN — FENTANYL CITRATE 75 MCG: 50 INJECTION, SOLUTION INTRAMUSCULAR; INTRAVENOUS at 10:47

## 2024-10-22 ASSESSMENT — PAIN - FUNCTIONAL ASSESSMENT
PAIN_FUNCTIONAL_ASSESSMENT: NONE - DENIES PAIN
PAIN_FUNCTIONAL_ASSESSMENT: 0-10

## 2024-10-22 ASSESSMENT — PAIN SCALES - GENERAL: PAINLEVEL_OUTOF10: 0

## 2024-10-22 NOTE — INTERVAL H&P NOTE
Update History & Physical    The patient's History and Physical of October 17,  was reviewed with the patient and I examined the patient. There was no change. The surgical site was confirmed by the patient and me.     Plan: The risks, benefits, expected outcome, and alternative to the recommended procedure have been discussed with the patient. Patient understands and wants to proceed with the procedure.     Electronically signed by Delio Melendez DO on 10/22/2024 at 10:23 AM    I reviewed with the resident the medical history and the resident's findings on the physical examination.  I discussed with the resident the patient's diagnosis and concur with the plan.    Ritesh Peña M.D.  10/22/24   12:50 PM

## 2024-10-22 NOTE — DISCHARGE INSTRUCTIONS
Dizziness, or Delayed Reaction Times   [x]A responsible person should be with you for the next 24 hours.  Do not operate any vehicles (automobiles, bicycles, motorcycles) or power tools or machinery for 24 hours.  Do not sign any legal documents or make any legal decisions for 24 hours. Do not drink alcohol for 24 hours or while taking narcotic pain medication.      Nausea    [x]Start with light diet and progress to your normal diet as you feel like eating. However, if you experience nausea or repeated episodes of vomiting which persist beyond 12-24 hours, notify your physician.  Once nausea has passed, remember to keep drinking fluids.    Difficulty Passing Urine  [x]Drink extra amounts of fluid today.  Notify your physician if you have not urinated within 8 hours after your procedure or you feel uncomfortable.      Irritated Throat from a Breathing Tube  [x]Drink extra amounts of fluid today.  Lozenges may help.    Muscle Aches  [x]You may experience some generalized body aches as your muscles recover from medications used to relax them during surgery.  These will gradually subside.    MEDICATION INSTRUCTIONS:  [x]Prescription(S) x   2  sent with you.  Use as directed.  When taking pain medications, you may experience the side effect of dizziness or drowsiness.  Do not drink alcohol or drive when taking these medications.  []Prescription(S) x          Called to Pharmacy Name and location:    [x]Give the list of your medications to your primary care physician on your next visit. Keep your med list updated and carry it with in case of emergencies.    [x] Narcotic pain medications can cause the side effect of significant constipation.  You may want to add a stool softener to your postoperative medication schedule or speak to your surgeon on how best to manage this side effect.    NARCOTIC SAFETY:  Your pain medicine is only for you to take.  Safely store your medicines.  Store pills up high and out of reach of

## 2024-10-22 NOTE — PROGRESS NOTES
Ambulatory Surgery/Procedure Discharge Note    Vitals:    10/22/24 1228   BP: 104/67   Pulse: 77   Resp: 14   Temp: 97.9 °F (36.6 °C)   SpO2: 98%       In: 550 [I.V.:550]  Out: 0     Restroom use offered before discharge.  Yes  Pt is toileted and remains safe. Discharge instructions were read at bedside and Dr. Peña more debrief instructions with the banding. Script altogether on chart. Reiterated to call Dr. Saldana office post appointment.     Pain assessment:  none  Pain Level: 0        Patient discharged to home/self care. Patient discharged via wheel chair by this nurse to waiting family/S.O.       10/22/2024 1:03 PM

## 2024-10-22 NOTE — PROGRESS NOTES
1115 Admitted to PACU from OR Connected to monitor. Report at bedside. Denies pain and nausea. Exparel used and scop patch on.

## 2024-10-22 NOTE — PROGRESS NOTES
PACU Transfer Note    Vitals:    10/22/24 1145   BP: (!) 109/59   Pulse: 78   Resp: 12   Temp: 97.9 °F (36.6 °C)   SpO2: 98%       In: 550 [I.V.:550]  Out: 0     Pain assessment:  none  Pain Level: 0  Wants to go home and watch soap operas. Declines soda or water.  Report given to Receiving unit RN.    10/22/2024 11:49 AM

## 2024-10-22 NOTE — ANESTHESIA PRE PROCEDURE
Department of Anesthesiology  Preprocedure Note       Name:  Cherelle Oconnor   Age:  69 y.o.  :  1955                                          MRN:  [id number]         Date:  10/22/2024      Surgeon: Surgeon(s):  Ritesh Peña MD    Procedure: Procedure(s):  EXAM UNDER ANETHESIA, POSSIBLE HEMORRHOID BANDING, POSSIBLE DELORME OPERATION    Medications prior to admission:   Prior to Admission medications    Medication Sig Start Date End Date Taking? Authorizing Provider   OZEMPIC, 0.25 OR 0.5 MG/DOSE, 2 MG/3ML SOPN Inject 0.5 mg into the skin once a week  ON  OZEMPIC 0.25-0.5 MG/DOSE PEN     Dispense date: 09/10/2024   Quantity: 3 mL   Days supply: 30   Unit form: Solution Therapy Pack 9/10/24   Latosha Sanders MD   metroNIDAZOLE (FLAGYL) 500 MG tablet Take one tablet by mouth 3 times on the day prior to surgery. Take one tablet at 1pm, 2pm and 9pm. 24   Ritesh Peña MD   neomycin (MYCIFRADIN) 500 MG tablet Take two tablets 3 times the day before surgery. Take two tablets at 1pm, two tablets at 2pm and two tablets at 9pm. 24   Ritesh Peña MD   ondansetron (ZOFRAN-ODT) 8 MG TBDP disintegrating tablet Take 1 tablet by mouth every 8 hours as needed for Nausea or Vomiting 24   Ritesh Peña MD   hydrALAZINE (APRESOLINE) 50 MG tablet Take 1 tablet by mouth 2 times daily 3/7/22   Ana Malin APRN - CNP   losartan (COZAAR) 100 MG tablet Take 1 tablet by mouth daily 22   Ana Malin APRN - CNP   amLODIPine (NORVASC) 10 MG tablet Take 1 tablet by mouth daily 21   Ana Malin APRN - CNP   zolpidem (AMBIEN) 10 MG tablet Take 1 tablet by mouth nightly. 3/9/21   Latosha Sanders MD   Multiple Vitamins-Minerals (MULTIVITAMIN PO) Take by mouth    Latosha Sanders MD   LORazepam (ATIVAN) 0.5 MG tablet Take 1 tablet by mouth every 12 hours as needed  Patient taking differently: Take 1 tablet by mouth 2 times daily. 3/31/16   Delio Landry MD

## 2024-10-22 NOTE — BRIEF OP NOTE
Brief Postoperative Note      Patient: Cherelle Oconnor  YOB: 1955  MRN: [id number]    Date of Procedure: 10/22/2024    Pre-Op Diagnosis Codes:      * Rectal mucosa prolapse [K62.3]    Post-Op Diagnosis: Same       Procedure(s):  ANAL BIOPSY AND HEMORRHOID BANDING    Surgeon(s):  Ritesh Peña MD    Assistant:  Resident: Delio Melendez DO    Anesthesia: General    Estimated Blood Loss (mL): Minimal    Complications: None    Specimens:   ID Type Source Tests Collected by Time Destination   A : A. ANAL BIOPSY Tissue Tissue SURGICAL PATHOLOGY Ritesh Peña MD 10/22/2024 1056        Implants:  * No implants in log *      Drains: * No LDAs found *    Findings:  Infection Present At Time Of Surgery (PATOS) (choose all levels that have infection present):  No infection present  Other Findings: 3x Hemorrhoid banding. Anal biopsy of previous procedure site taken for permanent. 10cc Experel used.     Electronically signed by Delio Melendze DO on 10/22/2024 at 11:16 AM

## 2024-10-22 NOTE — ANESTHESIA POSTPROCEDURE EVALUATION
Department of Anesthesiology  Postprocedure Note    Patient: Cherelle Oconnor  MRN: [id number]  YOB: 1955  Date of evaluation: 10/22/2024    Procedure Summary       Date: 10/22/24 Room / Location: [address]    Anesthesia Start: 1036 Anesthesia Stop: 1115    Procedure: ANAL BIOPSY AND HEMORRHOID BANDING Diagnosis:       Rectal mucosa prolapse      (Rectal mucosa prolapse [K62.3])    Surgeons: Ritesh Peña MD Responsible Provider: Robert Weeks MD    Anesthesia Type: general ASA Status: 3            Anesthesia Type: No value filed.    Glenis Phase I: Glenis Score: 10    Glenis Phase II: Glenis Score: 10    Anesthesia Post Evaluation    Patient location during evaluation: PACU  Patient participation: complete - patient participated  Level of consciousness: awake and alert  Pain score: 0  Airway patency: patent  Nausea & Vomiting: no nausea and no vomiting  Cardiovascular status: blood pressure returned to baseline  Respiratory status: acceptable  Hydration status: euvolemic    No notable events documented.  
no

## 2024-10-22 NOTE — OP NOTE
Operative Note      Patient: Cherelle Oconnor  YOB: 1955  MRN: [id number]    Date of Procedure: 10/22/2024    Pre-Op Diagnosis Codes:      * Rectal mucosa prolapse [K62.3]    Post-Op Diagnosis: Same       Procedure(s):  ANAL BIOPSY AND HEMORRHOID BANDING    Surgeon(s):  Ritesh Peña MD    Assistant:   Resident: Delio Melendez DO    Anesthesia: General    Estimated Blood Loss (mL): Minimal    Complications: None    Specimens:   ID Type Source Tests Collected by Time Destination   A : A. ANAL BIOPSY Tissue Tissue SURGICAL PATHOLOGY Ritesh Peña MD 10/22/2024 1056        Findings:  Infection Present At Time Of Surgery (PATOS) (choose all levels that have infection present):  No infection present  Other Findings: see below     Detailed Description of Procedure:     After informed consent was obtained the patient was taken to the operating room. General anesthesia was given. Time out was called to confirm key components. The patient was placed in the lithotomy position with appropriate padding. She was prepped and draped in the usual fashion.     We noted a small amount of mucosal prolapse right lateral. There was no prolapse on the left. In the right posterior anal canal there was a firm 8 mm white nodule either inflammatory or neoplastic in nature. We excised this with Metzenbaum scissors. Bovie was used for hemostasis. We then applied several hemorrhoid bands to the redundant mucosa to help it stay up and stop prolapsing. Exparel was given for post op pain control.    Procedure was tolerated well with no pain or bleeding and patient asked to be discharged from PACU.     Dr. Peña was present and performed all aspects of the operation.     Electronically signed by Ritesh Peña MD on 10/22/2024 at 12:51 PM

## 2024-11-11 ENCOUNTER — OFFICE VISIT (OUTPATIENT)
Dept: SURGERY | Age: 69
End: 2024-11-11

## 2024-11-11 VITALS
BODY MASS INDEX: 34.24 KG/M2 | DIASTOLIC BLOOD PRESSURE: 57 MMHG | OXYGEN SATURATION: 99 % | TEMPERATURE: 97.2 F | HEART RATE: 70 BPM | SYSTOLIC BLOOD PRESSURE: 93 MMHG | RESPIRATION RATE: 16 BRPM | WEIGHT: 169.6 LBS

## 2024-11-11 DIAGNOSIS — K62.3 RECTAL MUCOSA PROLAPSE: Primary | ICD-10-CM

## 2024-11-11 PROCEDURE — 99024 POSTOP FOLLOW-UP VISIT: CPT | Performed by: SURGERY

## 2024-11-11 NOTE — PROGRESS NOTES
Subjective:       Cherelle Oconnor presents to the clinic two weeks after mucosal banding    HPI: Doing well. Prolapse improved. No pain      Objective:      BP (!) 93/57 (Position: Sitting)   Pulse 70   Temp 97.2 °F (36.2 °C) (Infrared)   Resp 16   Wt 76.9 kg (169 lb 9.6 oz)   SpO2 99%   BMI 34.24 kg/m²     General:  alert, appears stated age, and cooperative   Abdomen: soft, bowel sounds active, non-tender   Incision:   Does not apply. Anal exam deferred given recent banding       Assessment:      Doing well postoperatively.      Plan:      1. Continue any current medications.  2. Wound care discussed.  3. Path = benign inflammation  4. See me two months    Ritesh Peña M.D.  11/11/24   9:20 AM      
Statement Selected

## 2025-01-13 ENCOUNTER — OFFICE VISIT (OUTPATIENT)
Dept: SURGERY | Age: 70
End: 2025-01-13

## 2025-01-13 VITALS
BODY MASS INDEX: 34.07 KG/M2 | SYSTOLIC BLOOD PRESSURE: 114 MMHG | TEMPERATURE: 98 F | HEART RATE: 77 BPM | OXYGEN SATURATION: 98 % | DIASTOLIC BLOOD PRESSURE: 60 MMHG | WEIGHT: 169 LBS | HEIGHT: 59 IN

## 2025-01-13 DIAGNOSIS — K64.9 HEMORRHOIDS, UNSPECIFIED HEMORRHOID TYPE: Primary | ICD-10-CM

## 2025-01-13 PROCEDURE — 99024 POSTOP FOLLOW-UP VISIT: CPT | Performed by: SURGERY

## 2025-01-13 NOTE — PROGRESS NOTES
Subjective:       Cherelle Oconnor presents to the clinic a few weeks after banding    HPI: Doing well. Minimal bleeding; no prolapse      Objective:      /60   Pulse 77   Temp 98 °F (36.7 °C) (Infrared)   Ht 1.499 m (4' 11\")   Wt 76.7 kg (169 lb)   SpO2 98%   BMI 34.13 kg/m²     General:  alert, appears stated age, and cooperative   Abdomen: soft, bowel sounds active, non-tender   Incision:   Deferred        Assessment:      Doing well postoperatively.      Plan:      1. Continue any current medications.  2. Wound care discussed.  3. See me as needed    Ritesh Peña M.D.  1/13/25   1:21 PM

## 2025-05-06 ENCOUNTER — HOSPITAL ENCOUNTER (OUTPATIENT)
Dept: MAMMOGRAPHY | Age: 70
Discharge: HOME | End: 2025-05-06
Payer: COMMERCIAL

## 2025-05-06 VITALS — WEIGHT: 169 LBS | HEIGHT: 59 IN | BODY MASS INDEX: 34.07 KG/M2

## 2025-05-06 DIAGNOSIS — Z12.31 VISIT FOR SCREENING MAMMOGRAM: ICD-10-CM

## 2025-05-06 PROCEDURE — 77063 BREAST TOMOSYNTHESIS BI: CPT

## 2025-08-22 ENCOUNTER — TRANSCRIBE ORDERS (OUTPATIENT)
Dept: ADMINISTRATIVE | Age: 70
End: 2025-08-22

## 2025-08-22 DIAGNOSIS — C50.411 MALIGNANT NEOPLASM OF UPPER-OUTER QUADRANT OF RIGHT FEMALE BREAST, UNSPECIFIED ESTROGEN RECEPTOR STATUS (HCC): Primary | ICD-10-CM

## (undated) DEVICE — COVER LT HNDL BLU PLAS

## (undated) DEVICE — GLOVE ORANGE PI 8   MSG9080

## (undated) DEVICE — HOOK RETRCT L5MM E SHRP SELF RET SYS LONE STAR

## (undated) DEVICE — DRAPE,UNDERBUTTOCKS,PCH,STERILE: Brand: MEDLINE

## (undated) DEVICE — GOWN,SIRUS,POLYRNF,BRTHSLV,XL,30/CS: Brand: MEDLINE

## (undated) DEVICE — GLOVE SURG SZ 85 L12IN FNGR THK79MIL GRN LTX FREE

## (undated) DEVICE — SUTURE VCRL SZ 0 L27IN ABSRB UD L36MM CT-1 1/2 CIR J260H

## (undated) DEVICE — GOWN,SIRUS,POLYRNF,BRTHSLV,XLN/XL,20/CS: Brand: MEDLINE

## (undated) DEVICE — SUTURE VCRL SZ 3-0 L27IN ABSRB UD L26MM SH 1/2 CIR J416H

## (undated) DEVICE — DRAPE,LAP,CHOLE,W/TROUGHS,STERILE: Brand: MEDLINE

## (undated) DEVICE — SEALER LAP L37CM MARYLAND JAW OPN NANO COAT MULTIFUNCTIONAL

## (undated) DEVICE — PRE OP PACK: Brand: MEDLINE INDUSTRIES, INC.

## (undated) DEVICE — OPEN-END FLEXI-TIP URETERAL CATHETER: Brand: FLEXI-TIP

## (undated) DEVICE — GOWN,SIRUS,POLYRNF,BRTHSLV,XLN/XXL,18/CS: Brand: MEDLINE

## (undated) DEVICE — INTENDED FOR TISSUE SEPARATION, AND OTHER PROCEDURES THAT REQUIRE A SHARP SURGICAL BLADE TO PUNCTURE OR CUT.: Brand: BARD-PARKER ® CARBON RIB-BACK BLADES

## (undated) DEVICE — TRAY CATHETER 16FR F INCLUDE BARDX IC COMPLT CARE DRNGE BG

## (undated) DEVICE — GAUZE,SPONGE,4"X4",16PLY,XRAY,STRL,LF: Brand: MEDLINE

## (undated) DEVICE — 3M™ TEGADERM™ TRANSPARENT FILM DRESSING FRAME STYLE, 1626W, 4 IN X 4-3/4 IN (10 CM X 12 CM), 50/CT 4CT/CASE: Brand: 3M™ TEGADERM™

## (undated) DEVICE — APPLIER CLP L9.38IN M LIG TI DISP STR RNG HNDL LIGACLP

## (undated) DEVICE — WET SKIN PREP TRAY: Brand: MEDLINE INDUSTRIES, INC.

## (undated) DEVICE — 3M™ IOBAN™ 2 ANTIMICROBIAL INCISE DRAPE 6648EZ: Brand: IOBAN™ 2

## (undated) DEVICE — JEWISH HOSPITAL TURNOVER KIT: Brand: MEDLINE INDUSTRIES, INC.

## (undated) DEVICE — GLOVE SURG SZ 7 L12IN FNGR THK79MIL GRN LTX FREE

## (undated) DEVICE — SPONGE,LAP,18"X18",DLX,XR,ST,5/PK,40/PK: Brand: MEDLINE

## (undated) DEVICE — STANDARD HYPODERMIC NEEDLE,POLYPROPYLENE HUB: Brand: MONOJECT

## (undated) DEVICE — CATHETER DRNGE 34FR 2 EYE PROPORTIONATE HD DISP FOR

## (undated) DEVICE — E-Z CLEAN, NON-STICK, PTFE COATED, ELECTROSURGICAL BLADE ELECTRODE, MODIFIED EXTENDED INSULATION, 2.5 INCH (6.35 CM): Brand: MEGADYNE

## (undated) DEVICE — RESERVOIR,SUCTION,100CC,SILICONE: Brand: MEDLINE

## (undated) DEVICE — Z INACTIVE USE 2659962 CONNECTOR CATH URET SIDE PRT FOR FRIC CONN [UCA595] [GU TEK]

## (undated) DEVICE — PLATE ES AD W 9FT CRD 2

## (undated) DEVICE — STAPLER SKIN H3.9MM WIRE DIA0.58MM CRWN 6.9MM 35 STPL ROT

## (undated) DEVICE — 60 ML SYRINGE,CATHETER TIP: Brand: MONOJECT

## (undated) DEVICE — GLOVE SURG SZ 7 L12IN FNGR THK87MIL WHT LTX FREE

## (undated) DEVICE — RING RETRCT W14.1XL14.1CM PLAS SELF RET ADJ LTWT DISP LONE 3307G] COOPER SURGICAL]

## (undated) DEVICE — [HIGH FLOW INSUFFLATOR,  DO NOT USE IF PACKAGE IS DAMAGED,  KEEP DRY,  KEEP AWAY FROM SUNLIGHT,  PROTECT FROM HEAT AND RADIOACTIVE SOURCES.]: Brand: PNEUMOSURE

## (undated) DEVICE — GLOVE ORANGE PI 8 1/2   MSG9085

## (undated) DEVICE — SYRINGE MED 10ML POLYPR LUERSLIP TIP FLAT TOP W/O SFTY DISP

## (undated) DEVICE — SOLUTION IV 1000ML 0.9% SOD CHL PH 5 INJ USP VIAFLX PLAS

## (undated) DEVICE — SHEET,DRAPE,40X58,STERILE: Brand: MEDLINE

## (undated) DEVICE — SYSTEM SMK EVAC LAP TBNG FILTER HSNG BENT STYL PNK SEE CLR

## (undated) DEVICE — SOLUTION ANTIFOG VIS SYS CLEARIFY LAPSCP

## (undated) DEVICE — PACK LAP IV REINF TBL CVR ADH UTIL UNDERBUTTOCK DRP W CUF

## (undated) DEVICE — TOWEL,STOP FLAG GOLD N-W: Brand: MEDLINE

## (undated) DEVICE — SURE SET-DOUBLE BASIN-LF: Brand: MEDLINE INDUSTRIES, INC.

## (undated) DEVICE — Device

## (undated) DEVICE — ST FLUFF LG 1 PLY: Brand: DEROYAL

## (undated) DEVICE — COVER,MAYO STAND,XL,STERILE: Brand: MEDLINE

## (undated) DEVICE — SKIN AFFIX SURG ADHESIVE 72/CS 0.55ML: Brand: MEDLINE

## (undated) DEVICE — SUTURE MCRYL SZ 4-0 L27IN ABSRB UD L19MM PS-2 1/2 CIR PRIM Y426H

## (undated) DEVICE — APPLICATOR MEDICATED 26 CC SOLUTION HI LT ORNG CHLORAPREP

## (undated) DEVICE — SUTURE PERMA-HAND SZ 2-0 L30IN NONABSORBABLE BLK L26MM SH K833H

## (undated) DEVICE — SYRINGE IRRIG 60ML SFT PLIABLE BLB EZ TO GRP 1 HND USE W/

## (undated) DEVICE — WOUND RETRACTOR AND PROTECTOR: Brand: ALEXIS WOUND PROTECTOR-RETRACTOR

## (undated) DEVICE — GARMENT,MEDLINE,DVT,INT,CALF,MED, GEN2: Brand: MEDLINE

## (undated) DEVICE — REDUCER: Brand: ENDOWRIST

## (undated) DEVICE — ACCESS PLATFORM FOR MINIMALLY INVASIVE SURGERY.: Brand: GELPORT® LAPAROSCOPIC  SYSTEM

## (undated) DEVICE — SUTURE PERMAHAND SZ 2-0 L30IN NONABSORBABLE BLK SILK W/O A305H

## (undated) DEVICE — SUTURE PERMAHAND SZ 3-0 L30IN NONABSORBABLE BLK SH L26MM K832H

## (undated) DEVICE — YANKAUER,OPEN TIP,W/O VENT,STERILE: Brand: MEDLINE INDUSTRIES, INC.

## (undated) DEVICE — SEAL

## (undated) DEVICE — TUBING IRRIG L77IN DIA0.241IN L BOR FOR CYSTO W/ NVENT

## (undated) DEVICE — UNDERPANTS INCONT XL 45-70IN KNIT SEAMLESS DSGN COLOR-CODED

## (undated) DEVICE — GLOVE SURG SZ 75 L12IN FNGR THK94MIL WHT POLYMER LTX BEAD

## (undated) DEVICE — LARGE NEEDLE DRIVER: Brand: ENDOWRIST

## (undated) DEVICE — SURGICAL SET UP - SURE SET: Brand: MEDLINE INDUSTRIES, INC.

## (undated) DEVICE — ELECTRO LUBE IS A SINGLE PATIENT USE DEVICE THAT IS INTENDED TO BE USED ON ELECTROSURGICAL ELECTRODES TO REDUCE STICKING.: Brand: KEY SURGICAL ELECTRO LUBE

## (undated) DEVICE — 3M™ WARMING BLANKET, UPPER BODY, 10 PER CASE, 42268: Brand: BAIR HUGGER™

## (undated) DEVICE — DRESSING GERM 6-12FR DIA1IN CNTR H 4MM ANTIMIC PROTCT DISK

## (undated) DEVICE — GLOVE ORANGE PI 7   MSG9070

## (undated) DEVICE — SEALANT TISS 4 CC FIBRIN VISTASEAL

## (undated) DEVICE — JELLY,LUBE,STERILE,FLIP TOP,TUBE,2-OZ: Brand: MEDLINE

## (undated) DEVICE — MINOR BREAST: Brand: MEDLINE INDUSTRIES, INC.

## (undated) DEVICE — PAD,ABDOMINAL,5"X9",ST,LF,25/BX: Brand: MEDLINE INDUSTRIES, INC.

## (undated) DEVICE — SUTURE CHROMIC GUT SZ 3-0 L27IN ABSRB BRN L26MM SH 1/2 CIR G122H

## (undated) DEVICE — SYRINGE MED 10ML TRNSLUC BRL PLUNG BLK MRK POLYPR CTRL

## (undated) DEVICE — RECTAL: Brand: MEDLINE INDUSTRIES, INC.

## (undated) DEVICE — PUMP SUC IRR TBNG L10FT W/ HNDPC ASSEMB STRYKEFLOW 2

## (undated) DEVICE — CANNULA SEAL

## (undated) DEVICE — Z DUP USE 2522782 SOLUTION IRRIG 1000ML STRL H2O PLAS CONTAINER UROMATIC

## (undated) DEVICE — COLUMN DRAPE

## (undated) DEVICE — LAPAROSCOPIC SCISSORS: Brand: EPIX LAPAROSCOPIC SCISSORS

## (undated) DEVICE — DRAPE,T,LAPARO,TRANS,STERILE: Brand: MEDLINE

## (undated) DEVICE — PREVENA INCISION MANAGEMENT SYSTEM- PEEL & PLACE DRESSING: Brand: PREVENA™ PEEL & PLACE™

## (undated) DEVICE — SUTURE VCRL + SZ 3-0 L18IN ABSRB UD SH 1/2 CIR TAPERCUT NDL VCP864D

## (undated) DEVICE — SOLUTION BOWL: Brand: KENDALL

## (undated) DEVICE — CHLORAPREP 26ML ORANGE

## (undated) DEVICE — INTENDED USE FOR SURGICAL MARKING ON INTACT SKIN, ALSO PROVIDES A PERMANENT METHOD OF IDENTIFYING OBJECTS IN THE OPERATING ROOM: Brand: WRITESITE® PLUS MINI PREP RESISTANT MARKER

## (undated) DEVICE — KIT,ANTI FOG,W/SPONGE & FLUID,SOFT PACK: Brand: MEDLINE

## (undated) DEVICE — APPLICATOR PREP 26ML 0.7% IOD POVACRYLEX 74% ISO ALC ST

## (undated) DEVICE — SUTURE ETHBND EXCEL 2-0 L30IN NONABSORBABLE GRN L26MM SH MX563

## (undated) DEVICE — ELECTROSURGICAL PENCIL ROCKER SWITCH NON COATED BLADE ELECTRODE 10 FT (3 M) CORD HOLSTER: Brand: MEGADYNE

## (undated) DEVICE — SUTURE VCRL SZ 2-0 L27IN ABSRB UD L26MM SH 1/2 CIR J417H

## (undated) DEVICE — GOWN,SIRUS,POLYRNF,BRTHSLV,LG,30/CS: Brand: MEDLINE

## (undated) DEVICE — DRAIN SURG 19FR 100% SIL RADPQ RND CHN FULL FLUT

## (undated) DEVICE — SHEET, T, LAPAROTOMY, STERILE: Brand: MEDLINE

## (undated) DEVICE — GLOVE SURG SZ 7 L12IN FNGR THK75MIL WHT LTX POLYMER BEAD

## (undated) DEVICE — ARM DRAPE

## (undated) DEVICE — TURNOVER KIT RM INF CTRL TECH

## (undated) DEVICE — PROBE SET W/ DRP

## (undated) DEVICE — SOLUTION INJ LR VISIV 1000ML BG

## (undated) DEVICE — PENCIL ES ULT VAC W TELSCP NOSE EZ CLN BLDE 10FT

## (undated) DEVICE — SUTURE PERMAHAND SZ 0 L30IN NONABSORBABLE BLK SILK BRAID A306H

## (undated) DEVICE — TRAY PREP DRY W/ PREM GLV 2 APPL 6 SPNG 2 UNDPD 1 OVERWRAP

## (undated) DEVICE — BLADELESS OBTURATOR: Brand: WECK VISTA

## (undated) DEVICE — BINDER ABD UNIV H9IN WAIST 45-62IN E SFT COT PREM 3 PNL

## (undated) DEVICE — SUTURE PDS II SZ 0 L60IN ABSRB VLT L48MM CTX 1/2 CIR Z990G

## (undated) DEVICE — TROCAR: Brand: KII FIOS FIRST ENTRY

## (undated) DEVICE — BLANKET WRM W29.9XL79.1IN UP BODY FORC AIR MISTRAL-AIR

## (undated) DEVICE — LIQUIBAND RAPID ADHESIVE 36/CS 0.8ML: Brand: MEDLINE

## (undated) DEVICE — GUIDEWIRE ENDOSCP L150CM DIA0.035IN TIP 3CM PTFE NIT

## (undated) DEVICE — TOWEL,OR,DSP,ST,BLUE,DLX,8/PK,10PK/CS: Brand: MEDLINE

## (undated) DEVICE — PENCIL SMK EVAC TELSCP 3 M TBNG

## (undated) DEVICE — SUTURE PROL SZ 2-0 L30IN NONABSORBABLE BLU L26MM CT-2 1/2 8411H